# Patient Record
Sex: FEMALE | Race: WHITE | NOT HISPANIC OR LATINO | Employment: OTHER | URBAN - NONMETROPOLITAN AREA
[De-identification: names, ages, dates, MRNs, and addresses within clinical notes are randomized per-mention and may not be internally consistent; named-entity substitution may affect disease eponyms.]

---

## 2018-01-31 ENCOUNTER — CONVERSION ENCOUNTER (OUTPATIENT)
Dept: FAMILY MEDICINE CLINIC | Age: 68
End: 2018-01-31

## 2018-02-06 ENCOUNTER — CONVERSION ENCOUNTER (OUTPATIENT)
Dept: FAMILY MEDICINE CLINIC | Age: 68
End: 2018-02-06

## 2018-03-01 ENCOUNTER — CONVERSION ENCOUNTER (OUTPATIENT)
Dept: FAMILY MEDICINE CLINIC | Age: 68
End: 2018-03-01

## 2018-03-16 ENCOUNTER — OFFICE VISIT CONVERTED (OUTPATIENT)
Dept: FAMILY MEDICINE CLINIC | Age: 68
End: 2018-03-16
Attending: FAMILY MEDICINE

## 2018-06-12 ENCOUNTER — OFFICE VISIT CONVERTED (OUTPATIENT)
Dept: FAMILY MEDICINE CLINIC | Age: 68
End: 2018-06-12
Attending: NURSE PRACTITIONER

## 2018-07-10 ENCOUNTER — OFFICE VISIT CONVERTED (OUTPATIENT)
Dept: FAMILY MEDICINE CLINIC | Age: 68
End: 2018-07-10
Attending: FAMILY MEDICINE

## 2018-08-07 ENCOUNTER — OFFICE VISIT CONVERTED (OUTPATIENT)
Dept: FAMILY MEDICINE CLINIC | Age: 68
End: 2018-08-07
Attending: FAMILY MEDICINE

## 2018-08-20 ENCOUNTER — CONVERSION ENCOUNTER (OUTPATIENT)
Dept: FAMILY MEDICINE CLINIC | Age: 68
End: 2018-08-20

## 2018-09-14 ENCOUNTER — CONVERSION ENCOUNTER (OUTPATIENT)
Dept: FAMILY MEDICINE CLINIC | Age: 68
End: 2018-09-14

## 2018-09-28 ENCOUNTER — CONVERSION ENCOUNTER (OUTPATIENT)
Dept: FAMILY MEDICINE CLINIC | Age: 68
End: 2018-09-28

## 2018-10-22 ENCOUNTER — CONVERSION ENCOUNTER (OUTPATIENT)
Dept: FAMILY MEDICINE CLINIC | Age: 68
End: 2018-10-22

## 2019-03-25 ENCOUNTER — HOSPITAL ENCOUNTER (OUTPATIENT)
Dept: OTHER | Facility: HOSPITAL | Age: 69
Discharge: HOME | End: 2019-03-25
Attending: FAMILY MEDICINE

## 2019-03-25 ENCOUNTER — OFFICE VISIT CONVERTED (OUTPATIENT)
Dept: FAMILY MEDICINE CLINIC | Age: 69
End: 2019-03-25
Attending: FAMILY MEDICINE

## 2019-03-25 LAB
ALBUMIN SERPL-MCNC: 3.7 G/DL (ref 3.5–5)
ALBUMIN/GLOB SERPL: 0.7 {RATIO} (ref 1.4–2.6)
ALP SERPL-CCNC: 113 U/L (ref 43–160)
ALT SERPL-CCNC: 11 U/L (ref 10–40)
ANION GAP SERPL CALC-SCNC: 18 MMOL/L (ref 8–19)
AST SERPL-CCNC: 19 U/L (ref 15–50)
BASOPHILS # BLD MANUAL: 0.06 10*3/UL (ref 0–0.2)
BASOPHILS NFR BLD MANUAL: 0.6 % (ref 0–3)
BILIRUB SERPL-MCNC: 0.29 MG/DL (ref 0.2–1.3)
BUN SERPL-MCNC: 16 MG/DL (ref 5–25)
BUN/CREAT SERPL: 16 {RATIO} (ref 6–20)
CALCIUM SERPL-MCNC: 9.6 MG/DL (ref 8.7–10.4)
CHLORIDE SERPL-SCNC: 89 MMOL/L (ref 99–111)
CHOLEST SERPL-MCNC: 176 MG/DL (ref 107–200)
CHOLEST/HDLC SERPL: 4.6 {RATIO} (ref 3–6)
CONV CO2: 33 MMOL/L (ref 22–32)
CONV TOTAL PROTEIN: 8.8 G/DL (ref 6.3–8.2)
CREAT UR-MCNC: 1 MG/DL (ref 0.5–0.9)
DEPRECATED RDW RBC AUTO: 45.9 FL
EOSINOPHIL # BLD MANUAL: 0.16 10*3/UL (ref 0–0.7)
EOSINOPHIL NFR BLD MANUAL: 1.6 % (ref 0–7)
ERYTHROCYTE [DISTWIDTH] IN BLOOD BY AUTOMATED COUNT: 12.8 % (ref 11.5–14.5)
EST. AVERAGE GLUCOSE BLD GHB EST-MCNC: 237 MG/DL
GFR SERPLBLD BASED ON 1.73 SQ M-ARVRAT: 57 ML/MIN/{1.73_M2}
GLOBULIN UR ELPH-MCNC: 5.1 G/DL (ref 2–3.5)
GLUCOSE SERPL-MCNC: 290 MG/DL (ref 65–99)
GRANS (ABSOLUTE): 6.79 10*3/UL (ref 2–8)
GRANS: 69.3 % (ref 30–85)
HBA1C MFR BLD: 14.8 G/DL (ref 12–16)
HBA1C MFR BLD: 9.9 % (ref 3.5–5.7)
HCT VFR BLD AUTO: 45.3 % (ref 37–47)
HDLC SERPL-MCNC: 38 MG/DL (ref 40–60)
IMM GRANULOCYTES # BLD: 0.03 10*3/UL (ref 0–0.54)
IMM GRANULOCYTES NFR BLD: 0.3 % (ref 0–0.43)
LDLC SERPL CALC-MCNC: 82 MG/DL (ref 70–100)
LYMPHOCYTES # BLD MANUAL: 1.94 10*3/UL (ref 1–5)
LYMPHOCYTES NFR BLD MANUAL: 8.4 % (ref 3–10)
MCH RBC QN AUTO: 31.6 PG (ref 27–31)
MCHC RBC AUTO-ENTMCNC: 32.7 G/DL (ref 33–37)
MCV RBC AUTO: 96.6 FL (ref 81–99)
MONOCYTES # BLD AUTO: 0.82 10*3/UL (ref 0.2–1.2)
OSMOLALITY SERPL CALC.SUM OF ELEC: 294 MOSM/KG (ref 273–304)
PLATELET # BLD AUTO: 355 10*3/UL (ref 130–400)
PMV BLD AUTO: 8.5 FL (ref 7.4–10.4)
POTASSIUM SERPL-SCNC: 4.1 MMOL/L (ref 3.5–5.3)
RBC # BLD AUTO: 4.69 10*6/UL (ref 4.2–5.4)
SODIUM SERPL-SCNC: 136 MMOL/L (ref 135–147)
TRIGL SERPL-MCNC: 281 MG/DL (ref 40–150)
TSH SERPL-ACNC: 2.61 M[IU]/L (ref 0.27–4.2)
VARIANT LYMPHS NFR BLD MANUAL: 19.8 % (ref 20–45)
VLDLC SERPL-MCNC: 56 MG/DL (ref 5–37)
WBC # BLD AUTO: 9.8 10*3/UL (ref 4.8–10.8)

## 2019-08-20 ENCOUNTER — TELEPHONE (OUTPATIENT)
Dept: ORTHOPEDIC SURGERY | Facility: CLINIC | Age: 69
End: 2019-08-20

## 2019-08-20 NOTE — TELEPHONE ENCOUNTER
HAD AN OPENING AVAILABLE ON 9/3/19 AT 11AM. I'VE MOVED HER TO THAT DATE. HER VOICE MAILBOX WAS FULL/AW

## 2019-09-18 ENCOUNTER — OFFICE VISIT (OUTPATIENT)
Dept: ORTHOPEDIC SURGERY | Facility: CLINIC | Age: 69
End: 2019-09-18

## 2019-09-18 DIAGNOSIS — M25.562 PAIN IN BOTH KNEES, UNSPECIFIED CHRONICITY: Primary | ICD-10-CM

## 2019-09-18 DIAGNOSIS — M25.561 PAIN IN BOTH KNEES, UNSPECIFIED CHRONICITY: Primary | ICD-10-CM

## 2019-09-18 PROCEDURE — 99204 OFFICE O/P NEW MOD 45 MIN: CPT | Performed by: PHYSICIAN ASSISTANT

## 2019-09-18 PROCEDURE — 20610 DRAIN/INJ JOINT/BURSA W/O US: CPT | Performed by: PHYSICIAN ASSISTANT

## 2019-09-18 RX ORDER — GLYBURIDE 5 MG/1
5 TABLET ORAL 2 TIMES DAILY
Refills: 4 | COMMUNITY
Start: 2019-08-14 | End: 2021-09-29

## 2019-09-18 RX ORDER — CLONAZEPAM 2 MG/1
2 TABLET ORAL 3 TIMES DAILY PRN
Refills: 0 | COMMUNITY
Start: 2019-08-09 | End: 2021-08-10

## 2019-09-18 RX ORDER — LIDOCAINE HYDROCHLORIDE 10 MG/ML
2 INJECTION, SOLUTION EPIDURAL; INFILTRATION; INTRACAUDAL; PERINEURAL
Status: COMPLETED | OUTPATIENT
Start: 2019-09-18 | End: 2019-09-18

## 2019-09-18 RX ORDER — WARFARIN SODIUM 2 MG/1
TABLET ORAL
COMMUNITY
Start: 2019-09-06 | End: 2021-08-10 | Stop reason: SDUPTHER

## 2019-09-18 RX ORDER — VENLAFAXINE HYDROCHLORIDE 150 MG/1
150 CAPSULE, EXTENDED RELEASE ORAL DAILY
COMMUNITY
Start: 2016-05-08 | End: 2021-06-21 | Stop reason: SDUPTHER

## 2019-09-18 RX ORDER — POTASSIUM CHLORIDE 20 MEQ/1
40 TABLET, EXTENDED RELEASE ORAL 2 TIMES DAILY
Refills: 5 | COMMUNITY
Start: 2019-08-23 | End: 2022-08-11 | Stop reason: SDUPTHER

## 2019-09-18 RX ORDER — VENLAFAXINE HYDROCHLORIDE 75 MG/1
75 CAPSULE, EXTENDED RELEASE ORAL DAILY
Refills: 2 | COMMUNITY
Start: 2019-06-26 | End: 2021-06-21

## 2019-09-18 RX ORDER — METOPROLOL TARTRATE 50 MG/1
75 TABLET, FILM COATED ORAL EVERY 8 HOURS
COMMUNITY
Start: 2016-05-18 | End: 2021-09-14

## 2019-09-18 RX ADMIN — LIDOCAINE HYDROCHLORIDE 2 ML: 10 INJECTION, SOLUTION EPIDURAL; INFILTRATION; INTRACAUDAL; PERINEURAL at 13:58

## 2019-09-18 RX ADMIN — LIDOCAINE HYDROCHLORIDE 2 ML: 10 INJECTION, SOLUTION EPIDURAL; INFILTRATION; INTRACAUDAL; PERINEURAL at 13:57

## 2019-09-18 NOTE — PROGRESS NOTES
NEW VISIT    Patient: Annie Friend  ?  YOB: 1950    MRN: [id number]  ?  Chief Complaint   Patient presents with   • Right Knee - Establish Care, Pain   • Left Knee - Establish Care, Pain      ?  HPI:   69-year-old female patient presents today with complaint of bilateral knee pain.  Her granddaughter accompanies her to today's visit.  She reports pain in both knees for the last 3 years along with a history of spinal stenosis and a laminectomy.  She states she has not driven a car for about 3 years due to her knees and her back.  In the last several months she has noticed more frequent, persistent pain in both knees.  Her left knee is causing more pain than the right but states the right knee has more crepitus upon movement.  She reports she would like to drive again if she can get her knees better.  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   She has seen Dr. Hickey in the past for the knee pain and placed on diclofenac but for some reason was taken off of it. Her past medical history is remarkable for uncontrolled diabetes with an A1c of 10%.  She states is not usually been this high in the past and her primary care provider is looking into changing her medications.      Pain Location: bilateral knee(s)  Radiation: Into medial face of tibia bilaterally  Quality: aching  Intensity/Severity: severe  Duration: 3 year(s) with acute exacerbation  Onset quality: gradual   Timing: constant  Aggravating Factors: any weight bearing, rising after sitting, walking or standing for prolonged time  Alleviating Factors: rest helps to ease the pain but does not resolve it  Previous Episodes: yes no  Associated Symptoms: pain, swelling, clicking/popping, decreased ROM  ADLs Affected: dressing, ambulating, recreational activities/sports  Previous Treatment: Oral NSAIDs    This patient is a new patient.  This problem is new to this  examiner.      Allergies: Allergies no known allergies    Medications:   Home Medications:  Current Outpatient Medications on File Prior to Visit   Medication Sig   • metoprolol tartrate (LOPRESSOR) 50 MG tablet Take 75 mg by mouth Every 8 (Eight) Hours.   • venlafaxine XR (EFFEXOR-XR) 150 MG 24 hr capsule Take 150 mg by mouth Daily.   • clonazePAM (KlonoPIN) 2 MG tablet Take 2 mg by mouth 3 (Three) Times a Day As Needed.   • glyBURIDE (DIAbeta) 5 MG tablet Take 5 mg by mouth 2 (Two) Times a Day.   • magnesium oxide (MAGOX) 400 (241.3 Mg) MG tablet tablet Take 1 tablet by mouth.   • potassium chloride (K-DUR,KLOR-CON) 20 MEQ CR tablet Take 40 mEq by mouth 2 (Two) Times a Day.   • venlafaxine XR (EFFEXOR-XR) 75 MG 24 hr capsule Take 75 mg by mouth Daily.   • warfarin (COUMADIN) 2 MG tablet      No current facility-administered medications on file prior to visit.      Current Medications:  Scheduled Meds:  PRN Meds:.    I have reviewed the patient's medical history in detail and updated the computerized patient record.  Review and summarization of old records include:    No past medical history on file.  No past surgical history on file.  Social History     Occupational History   • Not on file   Tobacco Use   • Smoking status: Not on file   Substance and Sexual Activity   • Alcohol use: Not on file   • Drug use: Not on file   • Sexual activity: Not on file      Social History     Social History Narrative   • Not on file     No family history on file.      Review of Systems  Constitutional: Negative.  Negative for fever.   Eyes: Negative.    Respiratory: Negative.    Cardiovascular: Negative.    Endocrine: Negative.    Musculoskeletal: Positive for arthralgias, gait problem and joint swelling.   Skin: Negative.  Negative for rash and wound.   Allergic/Immunologic: Negative.    Neurological: Negative for numbness.   Hematological: Negative.    Psychiatric/Behavioral: Negative.         Wt Readings from Last 3 Encounters:    No data found for Wt     Ht Readings from Last 3 Encounters:   No data found for Ht     There is no height or weight on file to calculate BMI.  No height and weight on file for this encounter.  There were no vitals filed for this visit.      Physical Exam  Constitutional: Patient is oriented to person, place, and time. Appears well-developed and well-nourished.   HENT:   Head: Normocephalic and atraumatic.   Eyes: Conjunctivae and EOM are normal. Pupils are equal, round, and reactive to light.   Cardiovascular: Normal rate and intact distal pulses.   Pulmonary/Chest: Effort normal and breath sounds normal.   Musculoskeletal:   See detailed exam below   Neurological: Alert and oriented to person, place, and time. No sensory deficit. Coordination normal.   Skin: Skin is warm and dry. Capillary refill takes less than 2 seconds. No rash noted. No erythema.   Psychiatric: Patient has a normal mood and affect. Her behavior is normal. Judgement and thought content normal.   Nursing note and vitals reviewed.      Ortho Exam:   Affected Knee(s): Bilateral knee  Patient has crepitus throughout range of motion.   Moderate effusion.   Significant joint line tenderness is noted on the medial aspect of the knee(s).   Patient has a varus orientation of the knee(s).   There is fullness and tenderness in the popliteal fossa.   Range of motion: Flexion 0- 100 degrees.   Neurovascular status is intact.  Dorsalis pedis and posterior tibial artery pulses are palpable. Common peroneal nerve function is well preserved.   Patient's gait is cautious and antalgic. Skin and soft tissues are mildly swollen, consistent with synovitis and effusion.   There is significant pitting edema bilateral lower extremities.  Special Testing:  Lachman negative,   Anterior drawernegative,   Posterior drawer negative,   Caridad's negative,   Patellofemoral grind positive,   Pivot shift is negative.        Diagnostics:  xrays obtained today  X-Ray  Report:  Bilateral knee(s) X-Ray  Indication: Evaluation of bilateral knee pain  AP, Lateral views  Findings: Advanced tricompartmental osteoarthritis bilaterally. Right knee appears most advanced in the medial space and Left knee appears more advanced in the patellofemoral space, although there is significant arthritis throughout the entire joint bilaterally. There is bone on bone appearance bilaterally.  Bony lesion: no  Soft tissues: increased  Joint spaces: decreased  Hardware appropriately positioned: not applicable  Prior studies available for comparison: no   X-RAY was ordered and reviewed by Devin Cueto PA-C  This patient's x-ray report was graded according to the Kellgren and Jesus classification.  This took into account the joint space narrowing, osteophyte formation, sclerosis of the distal femur/proximal tibia along with deformity of those bones.  The findings were indicative of K L grade 4 bilaterally.         Assessment:  Annie was seen today for establish care, pain, establish care and pain.    Diagnoses and all orders for this visit:    Pain in both knees, unspecified chronicity  -     XR Knee 3 View Bilateral  -     Large Joint Arthrocentesis: R knee  -     Large Joint Arthrocentesis: L knee          Procedures  See separate procedure note      Plan    · Steroid and Monovisc injection discussed-she is not a candidate for steroid injection at this time due to uncontrolled diabetes but she would like to proceed with a Monovisc injection.  · Injected patient's bilateral knee joint(s)with Monovisc from a(n) anteromedial approach.  Patient tolerated the procedure well and no complications were encountered.  · Exercise options discussed and encouraged  · Use of walker, hinge knee brace discussed and recommended   · Weight loss recommended  · Medication options discussed and recommended  · Surgical options discussed bilateral total knee-performed individually  · We will request records from  primary care provider regarding any visit for knee pain or treatment as well as diabetic records including lab work.  I will use these labs as a guide to determine possible future treatment such as steroid injections or total knee replacement.  · At this time patient states her A1C is 10% which will automatically rule her out as a candidate for a total knee replacement and or steroid injections until diabetes is better controlled.  · Rest, ice, compression, and elevation (RICE) therapy  · Stretching and strengthening exercises  · Alternate OTC Ibuprofen and Tylenol as needed/if clinically indicated  · I will have her follow up in 3 month(s) with Dr. Ayala so she can meet him and they can further discuss possible surgical or conservative treatment options based on diabetic control.    Date of encounter: 09/18/2019   Devin Cueto PA-C    Electronically signed by Devin Cueto PA-C, 09/18/19, 2:51 PM.

## 2019-09-18 NOTE — PROGRESS NOTES
Procedure   Large Joint Arthrocentesis: R knee  Date/Time: 9/18/2019 1:57 PM  Consent given by: patient  Site marked: site marked  Timeout: Immediately prior to procedure a time out was called to verify the correct patient, procedure, equipment, support staff and site/side marked as required   Supporting Documentation  Indications: pain   Procedure Details  Location: knee - R knee  Preparation: Patient was prepped and draped in the usual sterile fashion  Needle size: 25 G  Approach: anteromedial  Medications administered: 2 mL lidocaine PF 1% 1 %; 88 mg Hyaluronan 88 MG/4ML  Patient tolerance: patient tolerated the procedure well with no immediate complications    Large Joint Arthrocentesis: L knee  Date/Time: 9/18/2019 1:58 PM  Consent given by: patient  Site marked: site marked  Timeout: Immediately prior to procedure a time out was called to verify the correct patient, procedure, equipment, support staff and site/side marked as required   Supporting Documentation  Indications: pain   Procedure Details  Location: knee - L knee  Preparation: Patient was prepped and draped in the usual sterile fashion  Needle size: 25 G  Approach: anteromedial  Medications administered: 2 mL lidocaine PF 1% 1 %; 88 mg Hyaluronan 88 MG/4ML  Patient tolerance: patient tolerated the procedure well with no immediate complications      Electronically signed by Devin Cueto PA-C, 09/18/19, 4:47 PM.

## 2019-10-10 ENCOUNTER — HOSPITAL ENCOUNTER (OUTPATIENT)
Dept: OTHER | Facility: HOSPITAL | Age: 69
Discharge: HOME | End: 2019-10-10
Attending: FAMILY MEDICINE

## 2019-10-10 ENCOUNTER — OFFICE VISIT CONVERTED (OUTPATIENT)
Dept: FAMILY MEDICINE CLINIC | Age: 69
End: 2019-10-10
Attending: FAMILY MEDICINE

## 2019-10-10 LAB
ALBUMIN SERPL-MCNC: 3.5 G/DL (ref 3.5–5)
ALBUMIN/GLOB SERPL: 0.8 {RATIO} (ref 1.4–2.6)
ALP SERPL-CCNC: 97 U/L (ref 43–160)
ALT SERPL-CCNC: 8 U/L (ref 10–40)
ANION GAP SERPL CALC-SCNC: 15 MMOL/L (ref 8–19)
APPEARANCE UR: ABNORMAL
AST SERPL-CCNC: 13 U/L (ref 15–50)
BASOPHILS # BLD AUTO: 0.1 10*3/UL (ref 0–0.2)
BASOPHILS NFR BLD AUTO: 1.1 % (ref 0–3)
BILIRUB SERPL-MCNC: 0.32 MG/DL (ref 0.2–1.3)
BILIRUB UR QL: NEGATIVE
BUN SERPL-MCNC: 18 MG/DL (ref 5–25)
BUN/CREAT SERPL: 22 {RATIO} (ref 6–20)
CALCIUM SERPL-MCNC: 9.8 MG/DL (ref 8.7–10.4)
CHLORIDE SERPL-SCNC: 91 MMOL/L (ref 99–111)
CHOLEST SERPL-MCNC: 143 MG/DL (ref 107–200)
CHOLEST/HDLC SERPL: 4.1 {RATIO} (ref 3–6)
COLOR UR: YELLOW
CONV ABS IMM GRAN: 0.05 10*3/UL (ref 0–0.2)
CONV BACTERIA: ABNORMAL
CONV CO2: 34 MMOL/L (ref 22–32)
CONV COLLECTION SOURCE (UA): ABNORMAL
CONV HYALINE CASTS IN URINE MICRO: ABNORMAL /[LPF]
CONV IMMATURE GRAN: 0.5 % (ref 0–1.8)
CONV TOTAL PROTEIN: 8 G/DL (ref 6.3–8.2)
CONV UROBILINOGEN IN URINE BY AUTOMATED TEST STRIP: 1 {EHRLICHU}/DL (ref 0.1–1)
CREAT UR-MCNC: 0.83 MG/DL (ref 0.5–0.9)
DEPRECATED RDW RBC AUTO: 46.5 FL (ref 36.4–46.3)
EOSINOPHIL # BLD AUTO: 0.22 10*3/UL (ref 0–0.7)
EOSINOPHIL # BLD AUTO: 2.4 % (ref 0–7)
ERYTHROCYTE [DISTWIDTH] IN BLOOD BY AUTOMATED COUNT: 12.9 % (ref 11.7–14.4)
EST. AVERAGE GLUCOSE BLD GHB EST-MCNC: 266 MG/DL
GFR SERPLBLD BASED ON 1.73 SQ M-ARVRAT: >60 ML/MIN/{1.73_M2}
GLOBULIN UR ELPH-MCNC: 4.5 G/DL (ref 2–3.5)
GLUCOSE SERPL-MCNC: 240 MG/DL (ref 65–99)
GLUCOSE UR QL: 250 MG/DL
HBA1C MFR BLD: 10.9 % (ref 3.5–5.7)
HCT VFR BLD AUTO: 46.7 % (ref 37–47)
HDLC SERPL-MCNC: 35 MG/DL (ref 40–60)
HGB BLD-MCNC: 14.2 G/DL (ref 12–16)
HGB UR QL STRIP: ABNORMAL
KETONES UR QL STRIP: NEGATIVE MG/DL
LDLC SERPL CALC-MCNC: 61 MG/DL (ref 70–100)
LEUKOCYTE ESTERASE UR QL STRIP: ABNORMAL
LYMPHOCYTES # BLD AUTO: 2.32 10*3/UL (ref 1–5)
LYMPHOCYTES NFR BLD AUTO: 24.8 % (ref 20–45)
MAGNESIUM SERPL-MCNC: 1.95 MG/DL (ref 1.6–2.3)
MCH RBC QN AUTO: 29.9 PG (ref 27–31)
MCHC RBC AUTO-ENTMCNC: 30.4 G/DL (ref 33–37)
MCV RBC AUTO: 98.3 FL (ref 81–99)
MONOCYTES # BLD AUTO: 0.59 10*3/UL (ref 0.2–1.2)
MONOCYTES NFR BLD AUTO: 6.3 % (ref 3–10)
NEUTROPHILS # BLD AUTO: 6.07 10*3/UL (ref 2–8)
NEUTROPHILS NFR BLD AUTO: 64.9 % (ref 30–85)
NITRITE UR QL STRIP: NEGATIVE
NRBC CBCN: 0 % (ref 0–0.7)
OSMOLALITY SERPL CALC.SUM OF ELEC: 292 MOSM/KG (ref 273–304)
PH UR STRIP.AUTO: 6.5 [PH] (ref 5–8)
PLATELET # BLD AUTO: 309 10*3/UL (ref 130–400)
PMV BLD AUTO: 8.4 FL (ref 9.4–12.3)
POTASSIUM SERPL-SCNC: 4.2 MMOL/L (ref 3.5–5.3)
PROT UR QL: 100 MG/DL
RBC # BLD AUTO: 4.75 10*6/UL (ref 4.2–5.4)
RBC #/AREA URNS HPF: ABNORMAL /[HPF]
SODIUM SERPL-SCNC: 136 MMOL/L (ref 135–147)
SP GR UR: 1.03 (ref 1–1.03)
SQUAMOUS SPT QL MICRO: ABNORMAL /[HPF]
TRIGL SERPL-MCNC: 235 MG/DL (ref 40–150)
TSH SERPL-ACNC: 2.1 M[IU]/L (ref 0.27–4.2)
VLDLC SERPL-MCNC: 47 MG/DL (ref 5–37)
WBC # BLD AUTO: 9.35 10*3/UL (ref 4.8–10.8)
WBC #/AREA URNS HPF: ABNORMAL /[HPF]

## 2019-10-12 LAB — HCV AB S/CO SERPL IA: 0.2 S/CO RATIO (ref 0–0.9)

## 2019-10-13 LAB — BACTERIA UR CULT: NORMAL

## 2020-01-13 ENCOUNTER — OFFICE VISIT CONVERTED (OUTPATIENT)
Dept: FAMILY MEDICINE CLINIC | Age: 70
End: 2020-01-13
Attending: FAMILY MEDICINE

## 2020-01-28 ENCOUNTER — HOSPITAL ENCOUNTER (OUTPATIENT)
Dept: OTHER | Facility: HOSPITAL | Age: 70
Discharge: HOME | End: 2020-01-28
Attending: FAMILY MEDICINE

## 2020-02-01 LAB
7-AMINOCLONAZEPAM UR: >1000 NG/ML
A-OH ALPRAZ UR CFM-MCNC: <5 NG/ML
ALPHA-HYDROXYMIDAZOLAM, URINE: <20 NG/ML
ALPRAZ UR QL: <5 NG/ML
CLONAZEPAM UR QL: 46 NG/ML
CONV CHOLRDIAZEPOXIDE, QN URINE: <20 NG/ML
CONV OXAZEPAM, (TEMAZEPAM) QUANT: <20 NG/ML
DIAZEPAM UR-MCNC: <20 NG/ML
LORAZEPAM UR-MCNC: <20 NG/ML
MIDAZOLAM URINE: <20 NG/ML
NORDIAZEPAM URINE: <20 NG/ML
TEMAZEPAM UR QL CFM: <20 NG/ML

## 2020-04-08 ENCOUNTER — OFFICE VISIT CONVERTED (OUTPATIENT)
Dept: FAMILY MEDICINE CLINIC | Age: 70
End: 2020-04-08
Attending: FAMILY MEDICINE

## 2020-08-10 ENCOUNTER — OFFICE VISIT CONVERTED (OUTPATIENT)
Dept: FAMILY MEDICINE CLINIC | Age: 70
End: 2020-08-10
Attending: FAMILY MEDICINE

## 2020-10-19 ENCOUNTER — HOSPITAL ENCOUNTER (OUTPATIENT)
Dept: OTHER | Facility: HOSPITAL | Age: 70
Discharge: HOME | End: 2020-10-19
Attending: FAMILY MEDICINE

## 2020-10-19 LAB
APPEARANCE UR: ABNORMAL
BACTERIA UR CULT: NORMAL
BACTERIA UR QL AUTO: ABNORMAL
BILIRUB UR QL: NEGATIVE
CASTS URNS QL MICRO: ABNORMAL /[LPF]
COLOR UR: YELLOW
CONV LEUKOCYTE ESTERASE: NEGATIVE
CONV UROBILINOGEN IN URINE BY AUTOMATED TEST STRIP: 0.2 {EHRLICHU}/DL (ref 0.1–1)
EPI CELLS #/AREA URNS HPF: ABNORMAL /[HPF]
GLUCOSE 24H UR-MCNC: 500 MG/DL
HGB UR QL STRIP: ABNORMAL
KETONES UR QL STRIP: NEGATIVE MG/DL
MUCOUS THREADS URNS QL MICRO: ABNORMAL
NITRITE UR-MCNC: POSITIVE MG/ML
PH UR STRIP.AUTO: 5.5 [PH] (ref 5–8)
PROT UR-MCNC: 100 MG/DL
RBC # BLD AUTO: ABNORMAL /[HPF]
SP GR UR STRIP: >=1.03 (ref 1–1.03)
SPECIMEN SOURCE: ABNORMAL
UNIDENT CRYS URNS QL MICRO: ABNORMAL /[HPF]
WBC #/AREA URNS HPF: ABNORMAL /[HPF]

## 2020-10-22 LAB
AMPICILLIN SUSC ISLT: <=2
AMPICILLIN+SULBAC SUSC ISLT: <=2
BACTERIA UR CULT: ABNORMAL
CEFAZOLIN SUSC ISLT: <=4
CEFEPIME SUSC ISLT: <=0.12
CEFTAZIDIME SUSC ISLT: <=1
CEFTRIAXONE SUSC ISLT: <=0.25
CIPROFLOXACIN SUSC ISLT: <=0.25
ERTAPENEM SUSC ISLT: <=0.12
GENTAMICIN SUSC ISLT: <=1
LEVOFLOXACIN SUSC ISLT: <=0.12
NITROFURANTOIN SUSC ISLT: <=16
PIP+TAZO SUSC ISLT: <=4
TMP SMX SUSC ISLT: <=20
TOBRAMYCIN SUSC ISLT: <=1

## 2020-11-11 ENCOUNTER — OFFICE VISIT CONVERTED (OUTPATIENT)
Dept: FAMILY MEDICINE CLINIC | Age: 70
End: 2020-11-11
Attending: NURSE PRACTITIONER

## 2021-03-11 ENCOUNTER — OFFICE VISIT CONVERTED (OUTPATIENT)
Dept: FAMILY MEDICINE CLINIC | Age: 71
End: 2021-03-11
Attending: FAMILY MEDICINE

## 2021-05-18 NOTE — PROGRESS NOTES
Annie Friend  1950     Office/Outpatient Visit    Visit Date: Mon, Aug 10, 2020 02:24 pm    Provider: Aroldo Hickey MD (Assistant: Aura Kilpatrick MA)    Location: [facility]        Electronically signed by Aroldo Hickey MD on  08/10/2020 03:52:41 PM                             Subjective:        CC: Ca is a 70 year old White female.  Blister on right lower shin. Phone [phone]; Today's encounter is being done with a telehealth visit. She has consented verbally with two witnesses for todays treatment. Todays visit is being conducted by audio only. Individuals present during the telemedicine consultation include AROLDO SHARMA MD         HPI:           Complaint of blister (nonthermal), right lower leg, initial encounter..  The symptom began 2 days ago.  The severity is of mild intensity.  DESCRIBES AN ` 2 X 2 CM VESICLE ON THE ANTERIOR RIGHT LOWER LEG.      ROS:     CONSTITUTIONAL:  Negative for chills and fever.      E/N/T:  Negative for ear pain, frequent rhinorrhea and sore throat.      CARDIOVASCULAR:  Positive for palpitations ( OCC, BRIEF ) and pedal edema ( mild ).   Negative for chest pain.      RESPIRATORY:  Negative for recent cough and dyspnea.      GASTROINTESTINAL:  Negative for abdominal pain, nausea and vomiting.      NEUROLOGICAL:  Negative for headaches.          Past Medical History / Family History / Social History:         Last Reviewed on 8/10/2020 02:27 PM by Aroldo Hickey    Past Medical History:                 PAST MEDICAL HISTORY             GYNECOLOGICAL HISTORY:             CURRENT MEDICAL PROVIDERS:    Cardiologist             ADVANCED DIRECTIVES: None         PREVENTIVE HEALTH MAINTENANCE             COLORECTAL CANCER SCREENING: declines colorectal cancer screening, understands reason for testing     Hepatitis C Medicare Screening: was last done      MAMMOGRAM: was last done  with normal results     PAP  SMEAR: No longer indicated due to age and history         Surgical History:     Surgeries:    Appendectomy    Cholecystectomy Other Surgeries     section: X 1;     Hysterectomy: WITH BSO;     Laminectomy: lumbar region; 2016;;; Procedures: Treadmill stress test --NEG;; NEG ECHO AND HOLTER ;;         Family History:         Positive for Coronary Artery Disease.      Positive for Cerebrovascular Accident.  Father:  at age 84;  Valve surgery;  Dementia     Mother:  at age 73; Cause of death was CHF         Social History:     Occupation:    Retired     Marital Status:          Tobacco/Alcohol/Supplements:     Last Reviewed on 8/10/2020 02:27 PM by Aroldo Hickey    Tobacco: She has a past history of cigarette smoking; quit date:  18.          Substance Abuse History:     Last Reviewed on 8/10/2020 02:27 PM by Aroldo Hickey    NEGATIVE         Current Problems:     Last Reviewed on 8/10/2020 02:27 PM by Aroldo Hickey    Dysthymic disorder    Bilateral primary osteoarthritis of knee    Paroxysmal atrial fibrillation    Other long term (current) drug therapy    Essential (primary) hypertension    Long term (current) use of anticoagulants    Type 2 diabetes mellitus without complications    Peripheral vascular disease, unspecified    Chronic diastolic (congestive) heart failure        Immunizations:     zzPrevnar-13 2017    zzFluzone pf-quadrivalent 3 and up 2014    Fluzone (3 + years dose) 2010    Fluzone (3 + years dose) 12/15/2011    Pneomovax 2014    Fluzone High-Dose pf (>=65 yr) 10/7/2016    Fluzone High-Dose pf (>=65 yr) 10/13/2017    Fluzone High-Dose pf (>=65 yr) 2018    Fluzone High-Dose pf (>=65 yr) 10/10/2019        Allergies:     Last Reviewed on 8/10/2020 02:27 PM by Aroldo Hickey    Codeine Sulfate:      Penicillins:          Current Medications:     Last Reviewed on 8/10/2020 02:27 PM by Aroldo Hickey  Jesus    Potassium Chloride 20 mEq oral Tablet, Extended Release Particles/Crystals [2 tablets BID]    clonazePAM 2 mg oral tablet [TAKE 1 TABLET BY MOUTH THREE TIMES DAILY AS NEEDED]    venlafaxine 75 mg oral Capsule, Extended Release 24 hr [Take 1 capsule by mouth once daily]    warfarin 2 mg oral tablet [TAKE 1.5MG BY MOUTH ON MONDAY, WEDNESDAY, AND FRIDAY AND 2MG ON TUESDAY, THURSDAY, SATURDAY, AND SUNDAY]    warfarin 3 mg oral tablet [TAKE 1/2 (ONE-HALF) TABLET BY MOUTH ON MONDAY, WEDNESDAY, FRIDAY, AND 2 MG ON TUESDAY, THURSDAY, SATURDAY, AND SUNDAY.]    glyBURIDE 5 mg oral tablet [TAKE 1 TABLET BY MOUTH TWICE DAILY]    Metoprolol Tartrate 50 mg oral tablet [1 tab bid]    bisacodyl 10mg prn     Acetaminophen 650 mg oral tablet, extended release    Mag-Ox 400 400mg Tablet [1 po bid]    Bumetanide 2 mg oral tablet [1 tab bid]    digoxin 125 mcg (0.125 mg) oral tablet [Take 1 tablet by mouth once daily]        Assessment:         S80.821A   Blister (nonthermal), right lower leg, initial encounter           ORDERS:         Meds Prescribed:       [New Rx] Bactrim -160 mg oral tablet [take 1 tablet by oral route BID], #20 (twenty) tablets, Refills: 0 (zero)                 Plan:         Blister (nonthermal), right lower leg, initial encounter        RECOMMENDATIONS given include: ELEVATE LEG.      FOLLOW-UP: Schedule follow-up appointments on a p.r.n. basis.  Observe for now Consider further workup Telehealth: Verbal consent obtained for visit to occur via phone call; Total time spent was 5:05 minutes; [id number]--Illusbxug E/M 5-10 minutes           Prescriptions:       [New Rx] Bactrim -160 mg oral tablet [take 1 tablet by oral route BID], #20 (twenty) tablets, Refills: 0 (zero)             Patient Recommendations:        For  Blister (nonthermal), right lower leg, initial encounter:    Schedule follow-up appointments as needed.              Charge Capture:         Primary Diagnosis:     S80.821A   Blister (nonthermal), right lower leg, initial encounter           Orders:      [id number]  Phys/QHP telephone evaluation 5-10 min  (In-House)

## 2021-05-18 NOTE — PROGRESS NOTES
Annie FriendSindy 1950     Office/Outpatient Visit    Visit Date: Tue, Aug 7, 2018 04:17 pm    Provider: Aroldo Hickey MD (Assistant: Gloria Charles MA)    Location: [facility]        Electronically signed by Aroldo Hickey MD on  2018 06:26:14 PM                             SUBJECTIVE:        CC:     Ca is a 68 year old White female.  This is a follow-up visit.  ONE MONTH FOLLOW UP; PT STATES SHE STOPPED TAKING  MIRTAZAPINE OR ATORVASTATIN;         HPI:         Complaint of cHF..  The symptom began several months ago.  The severity is of moderate intensity.  LITTLE RESPONSE TO THE LASIX, WILL SEE CARDIOLOGY AGAIN SOON.      ROS:     CONSTITUTIONAL:  Positive for fatigue.   Negative for chills or fever.      CARDIOVASCULAR:  Positive for pedal edema ( moderate ).   Negative for chest pain or palpitations.      RESPIRATORY:  Negative for recent cough, dyspnea and change in her resp status from discharge from hospital.      GASTROINTESTINAL:  Negative for abdominal pain, nausea and vomiting.      MUSCULOSKELETAL:  Negative for myalgias.      NEUROLOGICAL:  Positive for weakness ( generalized ).   Negative for headaches.          PMH/FMH/SH:     Last Reviewed on 2018 04:33 PM by Aroldo Hickey    Past Medical History:                 PAST MEDICAL HISTORY             GYNECOLOGICAL HISTORY:        Last mammogram was 2013 Hospitalizations:    Pneumonia             ADVANCED DIRECTIVES: None         Surgical History:     Surgeries:    Appendectomy    Cholecystectomy Other Surgeries     section: X 1;     Hysterectomy: WITH BSO;     Laminectomy: lumbar region; 2016;;; Procedures: colonoscopy DECLINES;; Treadmill stress test --NEG;; NEG ECHO AND HOLTER ;;         Family History:         Positive for Coronary Artery Disease.      Positive for Cerebrovascular Accident.  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          Substance Abuse History:     Last Reviewed on 8/07/2018 04:31 PM by Aroldo Hickey    NEGATIVE             Current Problems:     Last Reviewed on 8/07/2018 04:39 PM by Aroldo Hickey    Urinary retention     CHF     Unspecified PVD-right leg     Unspecified PVD     NIDDM     HTN     Use of high risk medications     Paroxysmal atrial fibrillation     Osteoarthritis of knee     Depression responsive to treatment     Surgical menopause     Anticoagulation followup         Immunizations:     zzPrevnar-13 6/12/2017     zzFluzone pf-quadrivalent 3 and up 11/6/2014     Fluzone (3 + years dose) 11/24/2010     Fluzone (3 + years dose) 12/15/2011     Pneomovax 11/6/2014     Fluzone High-Dose pf (>=65 yr) 10/7/2016     Fluzone High-Dose pf (>=65 yr) 10/13/2017         Allergies:     Last Reviewed on 8/07/2018 04:31 PM by Aroldo Hickey    Codeine Sulfate:    Penicillins:        Current Medications:     Last Reviewed on 8/07/2018 04:37 PM by Aroldo Hickey    Digoxin 125mcg Tablet 1 tab daily     Glyburide 5mg Tablet Take 1 tablet(s) by mouth bid     Metoprolol Tartrate 25mg Tablet 1/2 tab daily     Tamsulosin HCl 0.4mg Capsules 1 tab daily     Mag-Ox 400 400mg Tablet 1 po bid     Potassium Chloride 20mEq Tablets, Extended Release 2 tablets BID     Venlafaxine HCl 75mg Capsules, Extended Release 1 cap daily     Klonopin 2mg Tablet 1 tab HS     Lasix 40mg Tablets 1 tab bid     Warfarin Sodium 1mg Tablet 1 qd     Warfarin Sodium 2.5mg Tablet One PO QD     Warfarin Sodium 3mg Tablet 1.5mg M & F 3mg rest of week.     O-2 2 L PER NC AT HS      Aspirin (ASA) 81mg Chewable Tablet one a day     Acetaminophen 650mg Tablets, Extended Release     bisacodyl 10mg prn         OBJECTIVE:        Vitals:         Current: 8/7/2018 4:25:59 PM    Ht:  5 ft, 5.5 in;  Wt: 238.4 lbs;  BMI: 39.1    T: 98.1 F (oral);  BP: 107/73 mm Hg (right arm, sitting);  P: 125 bpm (right arm (BP Cuff), sitting);  sCr: 0.77 mg/dL;  GFR: 83.99        Repeat:     4:26:13 PM     P:   88bpm (finger clip, sitting)         Exams:     PHYSICAL EXAM:     GENERAL: vital signs recorded - well developed, well nourished;  no apparent distress;     NECK: trachea is midline; thyroid is non-palpable;     RESPIRATORY: normal respiratory rate and pattern with no distress; normal breath sounds with no rales, rhonchi, wheezes or rubs;     CARDIOVASCULAR: normal rate; rhythm is regular;  no systolic murmur; 2+ pedal edema;     LYMPHATIC: no enlargement of cervical or facial nodes;     MUSCULOSKELETAL: gait: uses a walker;     NEUROLOGIC: GROSSLY INTACT;     PSYCHIATRIC:  appropriate affect and demeanor; normal speech pattern; grossly normal memory;         Lab/Test Results:         LABORATORY RESULTS: PT/INR 2.9-fk         ASSESSMENT           428.0   I50.32  CHF              DDx:     V58.61   Z79.01  Anticoagulation followup              DDx:         ORDERS:         Meds Prescribed:       Refill of: Potassium Chloride 20mEq Tablets, Extended Release 2 tablets BID  #120 (One Walnut and Twenty) tablet(s) Refills: 5         Lab Orders:       [id number]  PT/INR  (In-House)           Procedures Ordered:       [id number]  Collection of capillary blood specimen (eg, finger, heel, ear stick)  (In-House)                   PLAN:          CHF         RECOMMENDATIONS given include: SEE CARDIOLOGY AS PLANNED.      FOLLOW-UP: AFTER CARDIOLOGY APPT           Prescriptions:       Refill of: Potassium Chloride 20mEq Tablets, Extended Release 2 tablets BID  #120 (One Walnut and Twenty) tablet(s) Refills: 5          Anticoagulation  followup           Orders:       [id number]  Collection of capillary blood specimen (eg, finger, heel, ear stick)  (In-House)         [id number]  PT/INR  (In-House)               CHARGE CAPTURE           **Please note: ICD descriptions below are intended for billing purposes only and may not represent clinical diagnoses**        Primary Diagnosis:         428.0 CHF            I50.32    Chronic diastolic (congestive) heart failure              Orders:          [id number]   Office/outpatient visit; established patient, level 4  (In-House)           V58.61 Anticoagulation followup            Z79.01    Long term (current) use of anticoagulants              Orders:          [id number]   Collection of capillary blood specimen (eg, finger, heel, ear stick)  (In-House)             [id number]   PT/INR  (In-House)

## 2021-05-18 NOTE — PROGRESS NOTES
Annie Friend 1950     Office/Outpatient Visit    Visit Date: Mon, Mar 25, 2019 03:19 pm    Provider: Aroldo Hickey MD (Assistant: Arlene Rahman MA)    Location: [facility]        Electronically signed by Aroldo Hickey MD on  03/25/2019 09:41:51 PM                             SUBJECTIVE:        CC: (DOSAGE CHANGE BY DR BECK METOPROLOL TARTRATE 50MG BID)     Ca is a 69 year old White female.  This is a follow-up visit.  Chronics;         HPI:         Ca presents with nIDDM.  Specifically, this is type 2, non-insulin requiring diabetes.  Compliance with treatment has been good.  Current meds include none, was on insulin in patient.  She does not perform home blood glucose monitoring.  Most recent lab results include Systolic BP:  135 (03/16/2018), Diastolic BP:  83 (06/12/2018), Hemoglobin A1c:  7.2 (%) (01/24/2018), LDL:  80 (mg/dL) (01/24/2018), HDL:  32 (mg/dL) (01/24/2018), Triglycerides:  189 (mg/dL) (01/24/2018), Foot Exam (Annual):  01/24/2018 (01/24/2018).          With regard to the hTN, her current cardiac medication regimen includes a diuretic, a beta-blocker, an alpha blocker, and potassium.  Ca does not check her blood pressure other than at her clinic appointments.  Compliance with treatment has been good.          Additionally, she presents with history of depression responsive to treatment.  this is a routine follow-up.  Current medications include an antidepressant and an anxiolytic.  DOING WELL ON CURRENT MEDS     ROS:     CONSTITUTIONAL:  Positive for fatigue.   Negative for chills or fever.      CARDIOVASCULAR:  Positive for pedal edema ( moderate ).   Negative for chest pain or palpitations.      RESPIRATORY:  Negative for recent cough, dyspnea and change in her resp status from discharge from hospital.      GASTROINTESTINAL:  Negative for abdominal pain, nausea and vomiting.      MUSCULOSKELETAL:  Negative for myalgias.      NEUROLOGICAL:  Positive for  weakness ( generalized ).   Negative for headaches.          PMH/FMH/SH:     Last Reviewed on 3/25/2019 09:16 PM by Aroldo Hickey    Past Medical History:                 PAST MEDICAL HISTORY             GYNECOLOGICAL HISTORY:             CURRENT MEDICAL PROVIDERS:    Cardiologist             ADVANCED DIRECTIVES: None         PREVENTIVE HEALTH MAINTENANCE             COLORECTAL CANCER SCREENING: declines colorectal cancer screening, understands reason for testing         Surgical History:     Surgeries:    Appendectomy    Cholecystectomy Other Surgeries     section: X 1;     Hysterectomy: WITH BSO;     Laminectomy: lumbar region; 2016;;; Procedures: Treadmill stress test --NEG;; NEG ECHO AND HOLTER ;;         Family History:         Positive for Coronary Artery Disease.      Positive for Cerebrovascular Accident.  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          Substance Abuse History:     Last Reviewed on 3/25/2019 09:10 PM by Aroldo Hickey    NEGATIVE             Current Problems:     Last Reviewed on 3/25/2019 09:30 PM by Aroldo Hickey    Urinary retention     CHF     Unspecified PVD     NIDDM     HTN     Use of high risk medications     Paroxysmal atrial fibrillation     Osteoarthritis of knee     Depression responsive to treatment     Surgical menopause     Anticoagulation followup         Immunizations:     zzPrevnar-13 2017     zzFluzone pf-quadrivalent 3 and up 2014     Fluzone (3 + years dose) 2010     Fluzone (3 + years dose) 12/15/2011     Pneomovax 2014     Fluzone High-Dose pf  (>=65 yr) 10/7/2016     Fluzone High-Dose pf (>=65 yr) 10/13/2017     Fluzone High-Dose pf (>=65 yr) 9/28/2018         Allergies:     Last Reviewed on 3/25/2019 09:11 PM by Aroldo Hickey    Codeine Sulfate:    Penicillins:        Current Medications:     Last Reviewed on 3/25/2019 09:19 PM by Aroldo Hickey    Klonopin 2mg Tablet 1 tab HS     Digoxin 125mcg Tablet 1 tab daily     Venlafaxine HCl 75mg Capsules, Extended Release 1 cap daily     Glyburide 5mg Tablet Take 1 tablet(s) by mouth bid     Warfarin Sodium 2mg Tablet Take 1 tablet(s) by mouth daily     Warfarin Sodium 3mg Tablet take 1.5mg  on M/W/F and 2mg on Tu/Th/Sat/Sun.     Potassium Chloride 20mEq Tablets, Extended Release 2 tablets BID     Tamsulosin HCl 0.4mg Capsules 1 tab daily     Mag-Ox 400 400mg Tablet 1 po bid     O-2 2 L PER NC AT HS     Aspirin (ASA) 81mg Chewable Tablet one a day     Bumetanide 2mg Tablet 1 tab bid     Acetaminophen 650mg Tablets, Extended Release     bisacodyl 10mg prn     Metoprolol Tartrate 50mg Tablet 1 tab bid         OBJECTIVE:        Vitals:         Current: 3/25/2019 3:27:16 PM    Ht:  5 ft, 5.5 in;  Wt: 249.8 lbs;  BMI: 40.9    T: 97.8 F (oral);  BP: 141/70 mm Hg (left arm, sitting);  P: 97 bpm (left arm (BP Cuff), sitting);  sCr: 0.77 mg/dL;  GFR: 84.49        Exams:     PHYSICAL EXAM:     GENERAL: vital signs recorded - well developed, well nourished;  no apparent distress;     NECK: trachea is midline; thyroid is non-palpable;     RESPIRATORY: normal respiratory rate and pattern with no distress; normal breath sounds with no rales, rhonchi, wheezes or rubs;     CARDIOVASCULAR: normal rate; rhythm is regular;  no systolic murmur; trace pedal edema;     LYMPHATIC: no enlargement of cervical or facial nodes;     MUSCULOSKELETAL: gait: uses a walker;     NEUROLOGIC: GROSSLY INTACT;     PSYCHIATRIC:  appropriate affect and demeanor; normal speech pattern; grossly normal memory;         ASSESSMENT            250.00   E11.9  NIDDM              DDx:     401.1   I10  HTN              DDx:     296.25   F34.1  Depression responsive to treatment              DDx:         ORDERS:         Lab Orders:       [id number]  DIAB1 - [facility] LIPID,CMP, A1C: 51004, 28111, 86203  (Send-Out)         APPTO  Appointment need  (In-House)         51698  BDCBC - [facility] CBC with 3 part diff  (Send-Out)         66337  TSH - [facility] TSH  (Send-Out)                   PLAN:          NIDDM     LABORATORY:  Labs ordered to be performed today include Diabetes Panel 1; CMP, Lipid, A1C.      FOLLOW-UP: Schedule a follow-up visit in 6 months..            Orders:       [id number]  DIAB1 - [facility] LIPID,CMP, A1C: 60781, 17590, 31645  (Send-Out)         APPTO  Appointment need  (In-House)            HTN     LABORATORY:  Labs ordered to be performed today include CBC and TSH.            Orders:       [id number]  BDCBC - [facility] CBC with 3 part diff  (Send-Out)         23222  TSH - [facility] TSH  (Send-Out)               Patient Recommendations:        For  NIDDM:     Schedule a follow-up visit in 6 months.                APPOINTMENT INFORMATION:        Monday Tuesday Wednesday Thursday Friday Saturday Sunday            Time:___________________AM  PM   Date:_____________________             CHARGE CAPTURE           **Please note: ICD descriptions below are intended for billing purposes only and may not represent clinical diagnoses**        Primary Diagnosis:         250.00 NIDDM            E11.9    Type 2 diabetes mellitus without complications              Orders:          [id number]   Office/outpatient visit; established patient, level 4  (In-House)             APPTO   Appointment need  (In-House)           401.1 HTN            I10    Essential (primary) hypertension    296.25 Depression responsive to treatment            F34.1    Dysthymic disorder

## 2021-05-18 NOTE — PROGRESS NOTES
Annie Friend  1950     Office/Outpatient Visit    Visit Date:  10:16 am    Provider: Christine Kurtz N.P. (Assistant: Clarisse Lopez LPN)    Location: [facility]        Electronically signed by Christine Kurtz N.P. on  2020 10:37:04 AM                             Subjective:        CC: Ca is a 70 year old White female.  UTI; Audio only [phone]        HPI:           Urinary tract infection, site not specified noted.  This began within the last 3 days.  Associated symptoms include fever to 100 degrees,  flank pain, urgency and urinary frequency.  She denies associated hematuria, nausea, vaginal discharge or vomiting.  Ca states that she had multiple prior episodes of similar discomfort.  Medical history is pertinent for recurrent UTIs.  Treatments tried thus far include Increasing fluid intake.  Treatment effectivenss has been generally ineffective.      ROS:     CONSTITUTIONAL:  Positive for fever ( low grade ).      CARDIOVASCULAR:  Negative for chest pain, orthopnea, paroxysmal nocturnal dyspnea and pedal edema.      RESPIRATORY:  Negative for dyspnea.      GASTROINTESTINAL:  Negative for abdominal pain, constipation, diarrhea, nausea and vomiting.      GENITOURINARY:  Positive for dysuria, frequent UTI's and frequent urination.      PSYCHIATRIC:  Negative for anxiety, depression, and sleep disturbances.          Past Medical History / Family History / Social History:         Last Reviewed on 2020 10:33 AM by Christine Kurtz    Past Medical History:                 PAST MEDICAL HISTORY             GYNECOLOGICAL HISTORY:             CURRENT MEDICAL PROVIDERS:    Cardiologist             ADVANCED DIRECTIVES: None         PREVENTIVE HEALTH MAINTENANCE             COLORECTAL CANCER SCREENING: declines colorectal cancer screening, understands reason for testing     Hepatitis C Medicare Screening: was last done      MAMMOGRAM: was  last done  with normal results     PAP SMEAR: No longer indicated due to age and history         Surgical History:     Surgeries:    Appendectomy    Cholecystectomy Other Surgeries     section: X 1;     Hysterectomy: WITH BSO;     Laminectomy: lumbar region; 2016;;; Procedures: Treadmill stress test --NEG;; NEG ECHO AND HOLTER ;;         Family History:         Positive for Coronary Artery Disease.      Positive for Cerebrovascular Accident.  Father:  at age 84;  Valve surgery;  Dementia     Mother:  at age 73; Cause of death was CHF         Social History:     Occupation:    Retired     Marital Status:          Tobacco/Alcohol/Supplements:     Last Reviewed on 2020 10:33 AM by Christine Kurtz    Tobacco: She has a past history of cigarette smoking; quit date:  18.          Substance Abuse History:     Last Reviewed on 2020 10:33 AM by Christine Kurtz    NEGATIVE         Mental Health History:     Last Reviewed on 2020 10:33 AM by Christine Kurtz        Communicable Diseases (eg STDs):     Last Reviewed on 2020 10:33 AM by Christine Kurtz        Current Problems:     Last Reviewed on 2020 10:33 AM by Christine Kurtz    Bilateral primary osteoarthritis of knee    Paroxysmal atrial fibrillation    Other long term (current) drug therapy    Essential (primary) hypertension    Long term (current) use of anticoagulants    Type 2 diabetes mellitus without complications    Peripheral vascular disease, unspecified    Chronic diastolic (congestive) heart failure        Immunizations:     zzPrevnar-13 2017    zzFluzone pf-quadrivalent 3 and up 2014    Fluzone (3 + years dose) 2010    Fluzone (3 + years dose) 12/15/2011    Pneomovax 2014    Fluzone High-Dose pf (>=65 yr) 10/7/2016    Fluzone High-Dose pf (>=65 yr) 10/13/2017    Fluzone High-Dose pf (>=65 yr) 2018    Fluzone High-Dose pf (>=65 yr) 10/10/2019         Allergies:     Last Reviewed on 11/11/2020 10:33 AM by Christine Kurtz    Codeine Sulfate:      Penicillins:          Current Medications:     Last Reviewed on 11/11/2020 10:33 AM by Christine Kurtz    Potassium Chloride 20 mEq oral Tablet, Extended Release Particles/Crystals [2 tablets BID]    clonazePAM 2 mg oral tablet [Take 1 tablet by mouth three times daily as needed]    venlafaxine 75 mg oral Capsule, Extended Release 24 hr [Take 1 capsule by mouth once daily]    warfarin 2 mg oral tablet [TAKE 1.5MG BY MOUTH ON MONDAY, WEDNESDAY, AND FRIDAY AND 2MG ON TUESDAY, THURSDAY, SATURDAY, AND SUNDAY]    warfarin 3 mg oral tablet [TAKE 1/2 (ONE-HALF) TABLET BY MOUTH ON MONDAY, WEDNESDAY, FRIDAY, AND 2 MG ON TUESDAY, THURSDAY, SATURDAY, AND SUNDAY.]    glyBURIDE 5 mg oral tablet [Take 1 tablet by mouth twice daily]    Metoprolol Tartrate 50 mg oral tablet [1 tab bid]    bisacodyl 10mg prn     Acetaminophen 650 mg oral tablet, extended release    Mag-Ox 400 400mg Tablet [1 po bid]    Bumetanide 2 mg oral tablet [1 tab bid]    digoxin 125 mcg (0.125 mg) oral tablet [Take 1 tablet by mouth once daily]        Objective:        Exams:     PHYSICAL EXAM:     GENERAL: no apparent distress;     NEUROLOGIC: GROSSLY INTACT     PSYCHIATRIC:  appropriate affect and demeanor; normal speech pattern; grossly normal memory;         Assessment:         N39.0   Urinary tract infection, site not specified           ORDERS:         Meds Prescribed:       [New Rx] Bactrim -160 mg oral tablet [take 1 tablet by mouth bid x 7 days], #14 (fourteen) tablets, Refills: 0 (zero)                 Plan:         Urinary tract infection, site not specified    Telehealth: Verbal consent obtained for visit to occur via phone call; Staff, other than provider, present during telephone visit include Annie FRYE; Total time spent was 6 minutes; [id number]--Adfoswsjf E/M 5-10 minutes     FOLLOW-UP: Advised to call if  there is no improvement 3 days.  :Schedule follow-up appointments on a p.r.n. basis.:.            Prescriptions:       [New Rx] Bactrim -160 mg oral tablet [take 1 tablet by mouth bid x 7 days], #14 (fourteen) tablets, Refills: 0 (zero)             Patient Recommendations:        For  Urinary tract infection, site not specified:    Follow-up by phone if no improvement in 3 days. Schedule follow-up appointments as needed.                APPOINTMENT INFORMATION:        Monday Tuesday Wednesday Thursday Friday Saturday Sunday            Time:___________________AM  PM   Date:_____________________             Charge Capture:         Primary Diagnosis:     N39.0  Urinary tract infection, site not specified           Orders:      [id number]  Phys/QHP telephone evaluation 5-10 min  (In-House)

## 2021-05-18 NOTE — PROGRESS NOTES
Annie Friend 1950     Office/Outpatient Visit    Visit Date: Tue, Jul 10, 2018 03:49 pm    Provider: Aroldo Hickey MD (Assistant: Ashley Escamilla)    Location: [facility]        Electronically signed by Aroldo Hickey MD on  07/11/2018 10:01:03 AM                             SUBJECTIVE:        CC:     Ca is a 68 year old White female.  The patient is accompanied into the exam room by her daughter.          HPI:         PHQ-9 Depression Screening: Completed form scanned and in chart; Total Score 8 Alcohol Consumption Screening: Completed form scanned and in chart; Total Score 2         Additionally, she presents with history of hTN.  her current cardiac medication regimen includes a diuretic, a beta-blocker, and potassium.  Ca does not check her blood pressure other than at her clinic appointments.  Compliance with treatment has been good.          Complaint of unspecified PVD-right leg..  The symptom began several months ago.  ESPERANZA 0.65 ON THE RIGHT.  WOUNDS HAVE HEALED         Complaint of cHF..  The symptom began years ago.  The severity is of moderate intensity.  HAD PULM EDEMA WHILE IN THE HOSPITAL.  STILL HAS SOME EDEMA.  NEEDS F/U WITH CARDIOLOGY.          Complaint of urinary retention..  The symptom began several weeks ago.  The severity is of moderate intensity.  BEGAN AFTER CATHETER REMOVAL, MED HELPING.      ROS:     CONSTITUTIONAL:  Positive for fatigue.   Negative for chills or fever.      CARDIOVASCULAR:  Positive for pedal edema ( moderate ).   Negative for chest pain or palpitations.      RESPIRATORY:  Negative for recent cough, dyspnea and change in her resp status from discharge from hospital.      GASTROINTESTINAL:  Negative for abdominal pain, nausea and vomiting.      MUSCULOSKELETAL:  Negative for myalgias.      NEUROLOGICAL:  Positive for weakness ( generalized ).   Negative for headaches.          PMH/FMH/SH:     Last Reviewed on 7/10/2018 04:20 PM by Aroldo Hickey  Jesus    Past Medical History:                 PAST MEDICAL HISTORY             GYNECOLOGICAL HISTORY:        Last mammogram was  Hospitalizations:    Pneumonia last admit date              ADVANCED DIRECTIVES: None         Surgical History:     Surgeries:    Appendectomy    Cholecystectomy Other Surgeries     section: X 1;     Hysterectomy: WITH BSO;     Laminectomy: lumbar region; 2016;;; Procedures: colonoscopy DECLINES;; Treadmill stress test --NEG;; NEG ECHO AND HOLTER ;;         Family History:         Positive for Coronary Artery Disease.      Positive for Cerebrovascular Accident.  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          Substance Abuse History:     Last Reviewed on 7/10/2018 04:20 PM by Aroldo Hickey    NEGATIVE             Current Problems:     Last Reviewed on 2018 09:42 AM by Aroldo Hickey    Urinary retention     CHF     Unspecified PVD-right leg     Unspecified PVD     NIDDM     HTN     Use of high risk medications     Paroxysmal atrial fibrillation     Osteoarthritis of knee     Depression responsive to treatment     Surgical menopause     Screening for depression     Anticoagulation followup         Immunizations:     zzPrevnar-13 2017     zzFluzone pf-quadrivalent 3 and up 2014     Fluzone (3 + years dose) 2010     Fluzone (3 + years dose) 12/15/2011     Pneomovax 2014     Fluzone High-Dose pf (>=65 yr) 10/7/2016     Fluzone High-Dose pf (>=65 yr) 10/13/2017         Allergies:     Last Reviewed on 7/10/2018 04:20 PM  by Arolod Hickey    Codeine Sulfate:    Penicillins:        Current Medications:     Last Reviewed on 7/10/2018 04:28 PM by Aroldo Hickey    Venlafaxine HCl 75mg Capsules, Extended Release 1 cap daily     Glyburide 5mg Tablet Take 1 tablet(s) by mouth bid     Klonopin 2mg Tablet 1 tab HS     Warfarin Sodium 1mg Tablet 1 qd     Warfarin Sodium 2.5mg Tablet One PO QD     Warfarin Sodium 3mg Tablet 1.5mg M & F 3mg rest of week.     Magnesium 400 MG bid     Aspirin (ASA) 81mg Chewable Tablet one a day     Atorvastatin Calcium 40mg Tablet 1 tab daily     Mag-Ox 400 400mg Tablet 1 po bid     Potassium Chloride 20mEq Tablets, Extended Release 2 tablets BID     Digoxin 125mcg Tablet 1 tab daily     Metoprolol Tartrate 25mg Tablet 1/2 tab daily     Mirtazapine 7.5mg Tablet Take 1 tablet(s) by mouth at bedtime     Tamsulosin HCl 0.4mg Capsules 1 tab daily     Ammonium Lactate 12% Lotion Apply sufficient amount to affected area tid and rub in     Lasix 40mg Tablets 1 tab bid     Acetaminophen 650mg Tablets, Extended Release     bisacodyl 10mg prn         OBJECTIVE:        Vitals:         Current: 7/10/2018 3:57:54 PM    Ht:  5 ft, 5.5 in;  Wt: 232.7 lbs;  BMI: 38.1    T: 98.3 F (oral);  BP: 139/92 mm Hg (left arm, sitting);  P: 80 bpm (left arm (BP Cuff), sitting);  sCr: 0.77 mg/dL;  GFR: 83.13        Exams:     PHYSICAL EXAM:     GENERAL: vital signs recorded - well developed, well nourished;  no apparent distress;     NECK: trachea is midline; thyroid is non-palpable;     RESPIRATORY: normal respiratory rate and pattern with no distress; normal breath sounds with no rales, rhonchi, wheezes or rubs;     CARDIOVASCULAR: normal rate; rhythm is regular;  no systolic murmur; 2+ pedal edema;     LYMPHATIC: no enlargement of cervical or facial nodes;     MUSCULOSKELETAL: gait: uses a walker;     NEUROLOGIC: GROSSLY INTACT;     PSYCHIATRIC:  appropriate affect and demeanor; normal speech pattern; grossly normal  memory;         Lab/Test Results:             Coumadin (text dose):  2mg daily/jam (07/10/2018),     INR:  2.6 (07/10/2018),             ASSESSMENT           428.0   I50.32  CHF              DDx:     401.1   I10  HTN              DDx:     443.9   I73.9  Unspecified PVD-right leg              DDx:     788.29   R33.8  Urinary retention              DDx:     V79.0   Z13.89  Screening for depression              DDx:     V58.61   Z79.01  Anticoagulation followup              DDx:         ORDERS:         Lab Orders:       [id number]  PT/INR  (In-House)         APPTO  Appointment need  (In-House)           Procedures Ordered:       [id number]  Collection of capillary blood specimen (eg, finger, heel, ear stick)  (In-House)         REFER  Referral to Specialist or Other Facility  (Send-Out)           Other Orders:         Depression screen positive and follow up plan documented  (In-House)                   PLAN:          CHF         WILL GET HER BACK IN WITH HER CARDIOLOGIST.      REFERRALS:  Referral initiated to a cardiologist ( at [facility]  MIPS Positive Depression Screen: CURRENTLY ON MEDS     FOLLOW-UP: Schedule a follow-up visit in 1 month..            Orders:       REFER  Referral to Specialist or Other Facility  (Send-Out)           Depression screen positive and follow up plan documented  (In-House)         APPTO  Appointment need  (In-House)            HTN         MEDICATIONS: (no change to current medication regimen)          Unspecified PVD-right leg         WILL DISCUSS FURTHER AT NEXT VISIT AND PROBABLY REFER TO VASCULAR          Urinary retention         MEDICATIONS: (no change to current medication regimen)     CONSIDER UROLOGY EVAL             Other Orders:       [id number]  Preventive medicine, established patient, age 65+ years  (In-House)         42599  PT/INR  (In-House)         77054  Collection of capillary blood specimen (eg, finger, heel, ear stick)  (In-House)           Patient Recommendations:         For  CHF:     I also recommend WILL GET HER BACK IN WITH HER CARDIOLOGIST..  Schedule a follow-up visit in 1 month.                APPOINTMENT INFORMATION:        Monday Tuesday Wednesday Thursday Friday Saturday Sunday            Time:___________________AM  PM   Date:_____________________         For  Unspecified PVD-right leg:     I also recommend WILL DISCUSS FURTHER AT NEXT VISIT AND PROBABLY REFER TO VASCULAR.          For  Urinary retention:     I also recommend CONSIDER UROLOGY EVAL.              CHARGE CAPTURE           **Please note: ICD descriptions below are intended for billing purposes only and may not represent clinical diagnoses**        Primary Diagnosis:         428.0 CHF            I50.32    Chronic diastolic (congestive) heart failure              Orders:          [id number]   Office/outpatient visit; established patient, level 4  (In-House)                Depression screen positive and follow up plan documented  (In-House)             APPTO   Appointment need  (In-House)           401.1 HTN            I10    Essential (primary) hypertension    443.9 Unspecified PVD-right leg            I73.9    Peripheral vascular disease, unspecified    788.29 Urinary retention            R33.8    Other retention of urine    V79.0 Screening for depression            Z13.89    Encounter for screening for other disorder    V58.61 Anticoagulation followup            Z79.01    Long term (current) use of anticoagulants        Other Orders:           [id number]   Preventive medicine, established patient, age 65+ years  (In-House)             69410   PT/INR  (In-House)             50975   Collection of capillary blood specimen (eg, finger, heel, ear stick)  (In-House)           ADDENDUMS:      ____________________________________    Addendum: 07/12/2018 09:23 AM -          Visit Note Faxed to:        User Entered Recipient; Number [phone]

## 2021-05-18 NOTE — PROGRESS NOTES
Annie Friend  1950     Office/Outpatient Visit    Visit Date: Mon, Jan 13, 2020 03:29 pm    Provider: Aroldo Hickey MD (Assistant: Arlene Rahman MA)    Location: [facility]        Electronically signed by Aroldo Hickey MD on  01/18/2020 12:01:55 PM                             Subjective:        CC: WARFARIN 1.5 M,W,F - 2MG T, THU, SAT, Maria Ines is a 69 year old White female.  This is a follow-up visit.  CHECK UP;         HPI:           Patient to be evaluated for dysthymic disorder.  This is a routine follow-up.  Current medications include an antidepressant and an anxiolytic.  DOING WELL ON CURRENT MEDS           Additionally, she presents with history of essential (primary) hypertension.  her current cardiac medication regimen includes a diuretic, a beta-blocker, and potassium.  Compliance with treatment has been good.  Pat does not check her blood pressure other than at her clinic appointments.            Dx with type 2 diabetes mellitus without complications; specifically, this is type 2, non-insulin requiring diabetes.  Compliance with treatment has been good.  Current meds include none, was on insulin in patient.  She does not perform home blood glucose monitoring.  Most recent lab results include Systolic BP:  135 (03/16/2018), Diastolic BP:  83 (06/12/2018), Hemoglobin A1c:  10.9 (%) (10/10/2019), Triglycerides:  189 (mg/dL) (01/24/2018), Foot Exam (Annual):  01/13/2020 (01/13/2020).      ROS:     CONSTITUTIONAL:  Negative for chills and fever.      E/N/T:  Negative for ear pain, frequent rhinorrhea and sore throat.      CARDIOVASCULAR:  Positive for palpitations ( OCC, BRIEF ) and pedal edema ( mild ).   Negative for chest pain.      RESPIRATORY:  Negative for recent cough and dyspnea.      GASTROINTESTINAL:  Negative for abdominal pain, nausea and vomiting.      NEUROLOGICAL:  Negative for headaches.          Past Medical History / Family History / Social History:          Last Reviewed on 2020 03:53 PM by Aroldo Hickey    Past Medical History:                 PAST MEDICAL HISTORY             GYNECOLOGICAL HISTORY:             CURRENT MEDICAL PROVIDERS:    Cardiologist             ADVANCED DIRECTIVES: None         PREVENTIVE HEALTH MAINTENANCE             COLORECTAL CANCER SCREENING: declines colorectal cancer screening, understands reason for testing     Hepatitis C Medicare Screening: was last done      PAP SMEAR: No longer indicated due to age and history         Surgical History:     Surgeries:    Appendectomy    Cholecystectomy Other Surgeries     section: X 1;     Hysterectomy: WITH BSO;     Laminectomy: lumbar region; 2016;;; Procedures: Treadmill stress test --NEG;; NEG ECHO AND HOLTER ;;         Family History:         Positive for Coronary Artery Disease.      Positive for Cerebrovascular Accident.  Father:  at age 84;  Valve surgery;  Dementia     Mother:  at age 73; Cause of death was CHF         Social History:     Occupation:    Retired     Marital Status:          Tobacco/Alcohol/Supplements:     Last Reviewed on 2020 03:53 PM by Aroldo Hickey    Tobacco: She has a past history of cigarette smoking; quit date:  18.          Substance Abuse History:     Last Reviewed on 2020 03:53 PM by Aroldo Hickey    NEGATIVE         Current Problems:     Last Reviewed on 2020 03:53 PM by Aroldo Hickey    Dysthymic disorder    Depression responsive to treatment    Osteoarthritis of knee    Bilateral primary osteoarthritis of knee    Use of high risk medications    Paroxysmal atrial fibrillation    Paroxysmal atrial fibrillation    Other long term (current) drug therapy    HTN    Essential (primary) hypertension    Long term (current) use of anticoagulants    Anticoagulation followup    NIDDM    Type 2 diabetes mellitus without complications    Peripheral vascular disease,  unspecified    Unspecified PVD    CHF    Chronic diastolic (congestive) heart failure        Immunizations:     zzPrevnar-13 6/12/2017    zzFluzone pf-quadrivalent 3 and up 11/6/2014    Fluzone (3 + years dose) 11/24/2010    Fluzone (3 + years dose) 12/15/2011    Pneomovax 11/6/2014    Fluzone High-Dose pf (>=65 yr) 10/7/2016    Fluzone High-Dose pf (>=65 yr) 10/13/2017    Fluzone High-Dose pf (>=65 yr) 9/28/2018    Fluzone High-Dose pf (>=65 yr) 10/10/2019        Allergies:     Last Reviewed on 1/13/2020 03:53 PM by Aroldo Hickey    Codeine Sulfate:      Penicillins:          Current Medications:     Last Reviewed on 1/13/2020 03:53 PM by Aroldo Hickey    Potassium Chloride 20 mEq oral Tablet, Extended Release Particles/Crystals [2 tablets BID]    clonazePAM 2 mg oral tablet [TAKE 1 TABLET BY MOUTH THREE TIMES DAILY AS NEEDED]    aspirin 81 mg oral tablet,chewable [one a day]    venlafaxine 75 mg oral Capsule, Extended Release 24 hr [TAKE 1 CAPSULE BY MOUTH ONCE DAILY]    warfarin 3 mg oral tablet [take 1.5mg  on M/W/F and 2mg on Tu/Th/Sat/Sun.]    warfarin 2 mg oral tablet [take 1.5mg on M/W/F and 2mg on Tu/Th/Sat/Sun]    Glyburide 5 mg oral tablet [Take 1 tablet(s) by mouth bid]    Metoprolol Tartrate 50 mg oral tablet [1 tab bid]    bisacodyl 10mg prn     Acetaminophen 650 mg oral tablet, extended release    Mag-Ox 400 400mg Tablet [1 po bid]    O-2 2 L PER NC AT HS     Bumetanide 2 mg oral tablet [1 tab bid]    digoxin 125 mcg (0.125 mg) oral tablet [TAKE 1 TABLET BY MOUTH ONCE DAILY]        Objective:        Vitals:         Current: 1/13/2020 3:37:25 PM    Ht:  5 ft, 5.5 in;  Wt: 254.4 lbs;  BMI: 41.7T: 97 F (oral);  BP: 127/96 mm Hg (left arm, sitting);  P: 79 bpm (left arm (BP Cuff), sitting);  sCr: 0.83 mg/dL;  GFR: 78.99        Exams:     PHYSICAL EXAM:     GENERAL: vital signs recorded - well developed, well nourished;  no apparent distress;     NECK: trachea is midline; thyroid is  non-palpable;     RESPIRATORY: normal respiratory rate and pattern with no distress; normal breath sounds with no rales, rhonchi, wheezes or rubs;     CARDIOVASCULAR: normal rate; rhythm is regular;  no systolic murmur; 1+ pedal edema;     GASTROINTESTINAL: nontender;     LYMPHATIC: no enlargement of cervical or facial nodes; no supraclavicular nodes;     NEUROLOGIC: GROSSLY INTACT;     PSYCHIATRIC:  appropriate affect and demeanor; normal speech pattern; grossly normal memory;     Left foot exam    Protective sensation using Monofilament test: NORMAL sensation. Patient detects .07 grams of force which is considered normal.    Vascular deficit noted in the posterior tibial artery    Skin is intact without sores or ulcers    Right foot exam    Protective sensation using Monofilament test: NORMAL sensation. Patient detects .07 grams of force which is considered normal.    Vascular deficit noted in the posterior tibial artery    Skin is intact without sores or ulcers         Assessment:         F34.1   Dysthymic disorder       I10   Essential (primary) hypertension       E11.9   Type 2 diabetes mellitus without complications       Z79.899   Other long term (current) drug therapy           ORDERS:         Meds Prescribed:       [Refilled] clonazePAM 2 mg oral tablet [TAKE 1 TABLET BY MOUTH THREE TIMES DAILY AS NEEDED], #90 (ninety) tablets, Refills: 2 (two)         Lab Orders:       APPTO  Appointment need  (In-House)            00928  Drug test prsmv qual dir optical obs per day  (Send-Out)    Pt could not void.          Other Orders:       2028F  Foot examination performed (includes examination through visual inspection, sensory exam with monofilament, and pulse exam - report when any of the three components are completed) (DM)4  (In-House)                      Plan:         Dysthymic disorder        FOLLOW-UP: Schedule a follow-up visit in 4 months.:.  Controlled substance documentation: Bobby reviewed; prior drug  screen consistent; consent is reviewed and signed and on the chart.  She is aware of risk of addiction on this medication, understands that she will need to follow up for a review every 3 months and her medications will be adjusted or decreased as deemed appropriate at each visit.  No history of drug or alcohol abuse.  No concerns about diversion or abuse. She denies side effects related to the medication.  She is aware that she may be called in for pill counts.  The dosing of this medication will be reviewed on a regular basis and reduced if possible..  Ongoing use of a controlled substance is necessary for this patient to have a normal quality of life           Prescriptions:       [Refilled] clonazePAM 2 mg oral tablet [TAKE 1 TABLET BY MOUTH THREE TIMES DAILY AS NEEDED], #90 (ninety) tablets, Refills: 2 (two)           Orders:       APPTO  Appointment need  (In-House)              Essential (primary) hypertension        MEDICATIONS: (no change to current medication regimen)         Type 2 diabetes mellitus without complications        WILL REPEAT LABS AT NEXT VISIT AND ADJUST MEDS AS INDICATED           Orders:       2028F  Foot examination performed (includes examination through visual inspection, sensory exam with monofilament, and pulse exam - report when any of the three components are completed) (DM)4  (In-House)              Other long term (current) drug therapy    LABORATORY:  Labs ordered to be performed today include Drug screen.            Orders:       [id number]  Drug test prsmv qual dir optical obs per day  (Send-Out)    Pt could not void.              Patient Recommendations:        For  Dysthymic disorder:    Schedule a follow-up visit in 4 months.                APPOINTMENT INFORMATION:        Monday Tuesday Wednesday Thursday Friday Saturday Sunday            Time:___________________AM  PM   Date:_____________________             Charge Capture:         Primary Diagnosis:     F34.1  Dysthymic  disorder           Orders:      67479  Office/outpatient visit; established patient, level 4  (In-House)            APPTO  Appointment need  (In-House)              I10  Essential (primary) hypertension     E11.9  Type 2 diabetes mellitus without complications           Orders:      2028F  Foot examination performed (includes examination through visual inspection, sensory exam with monofilament, and pulse exam - report when any of the three components are completed) (DM)4  (In-House)              Z79.899  Other long term (current) drug therapy

## 2021-05-18 NOTE — PROGRESS NOTES
Annie Friend 1950     Office/Outpatient Visit    Visit Date: Fri, Mar 16, 2018 03:50 pm    Provider: Aroldo Hickey MD (Assistant: Baylee Garcia MA)    Location: [facility]        Electronically signed by Aroldo Hickey MD on  03/17/2018 08:44:35 AM                             SUBJECTIVE:        CC:     Ca is a 67 year old White female.  (Pt discontinued baby aspirin) Discuss Metformin;         HPI:         Patient to be evaluated for nIDDM.  Specifically, this is type 2, non-insulin requiring diabetes.  Compliance with treatment has been good.  Current meds include an oral hypoglycemic.  Most recent lab results include Hemoglobin A1c:  7.2 (%) (01/24/2018), Foot Exam (Annual):  01/24/2018 (01/24/2018).  In regard to preventative care, she performs foot self-exams several times per month and her last ophthalmology exam was in ADVISED TO DO SO.  HAS STOPPED METFORMIN AND WOUD RATHER TRY SOMETHING ELSE         Additionally, she presents with history of hTN.  her current cardiac medication regimen includes a diuretic and an ACE inhibitor.  Ca does not check her blood pressure other than at her clinic appointments.  Compliance with treatment has been good.      ROS:     CONSTITUTIONAL:  Positive for unintentional weight gain ( 18 pounds ).   Negative for chills or fever.      CARDIOVASCULAR:  Positive for palpitations ( OCC, BRIEF ) and pedal edema ( moderate; WORSENING ).   Negative for chest pain, orthopnea or paroxysmal nocturnal dyspnea.      RESPIRATORY:  Positive for dyspnea ( with moderate exertion ).   Negative for recent cough.      GASTROINTESTINAL:  Negative for abdominal pain, nausea and vomiting.      MUSCULOSKELETAL:  Negative for myalgias.      INTEGUMENTARY/BREAST:  Positive for OPEN SORE LEFT GREAT TOE .  STATES IMPROVING.      NEUROLOGICAL:  Negative for headaches.          PMH/FMH/SH:     Last Reviewed on 3/16/2018 04:05 PM by Aroldo Hickey    Past Medical  History:                 PAST MEDICAL HISTORY             GYNECOLOGICAL HISTORY:        Last mammogram was 2013             ADVANCED DIRECTIVES: None         Surgical History:     Surgeries:    Appendectomy    Cholecystectomy Other Surgeries     section: X 1;     Hysterectomy: WITH BSO;     Laminectomy: lumbar region; 2016;;; Procedures: colonoscopy DECLINES;; Treadmill stress test 2008--NEG;; NEG ECHO AND HOLTER ;;         Family History:         Positive for Coronary Artery Disease.      Positive for Cerebrovascular Accident.          Social History:     Occupation: Nurse.   [employer]     Marital Status:          Tobacco/Alcohol/Supplements:     Last Reviewed on 3/16/2018 04:05 PM by Aroldo Hickey    Tobacco: Current Smoker: She currently smokes every day, 1/2 pack per day.          Substance Abuse History:     Last Reviewed on 3/16/2018 04:05 PM by Aroldo Hickey    NEGATIVE             Current Problems:     Last Reviewed on 3/16/2018 04:07 PM by Aroldo Hickey    Hand edema     NIDDM     HTN     Use of high risk medications     Paroxysmal atrial fibrillation     Osteoarthritis of knee     Depression responsive to treatment     Cigarette smoking     Surgical menopause     Open wound of toe     Anticoagulation followup         Immunizations:     zzPrevnar-13 2017     zzFluzone pf-quadrivalent 3 and up 2014     Fluzone (3 + years dose) 2010     Fluzone (3 + years dose) 12/15/2011     Pneomovax 2014     Fluzone High-Dose pf (>=65 yr) 10/7/2016     Fluzone High-Dose pf (>=65 yr) 10/13/2017         Allergies:     Last Reviewed on 3/16/2018 04:05 PM by Aroldo Hickey    Codeine Sulfate:    Penicillins:        Current Medications:     Last Reviewed on 3/16/2018 04:06 PM by Aroldo Hickey    Lasix 40mg Tablets 1/2 tab QOD     Warfarin Sodium 2mg Tablet Take 1 tablet(s) by mouth daily     Lisinopril 5mg Tablet ONE A DAY      "Klonopin 2mg Tablet 1 tab HS     Diclofenac Sodium 75mg Tablets, Enteric Coated 1 tab bid with food     Warfarin Sodium 2.5mg Tablet One PO QD     Venlafaxine HCl 150mg Capsules, Extended Release 1 tab daily     Metformin HCl 500mg Tablet 1 tab bid     Warfarin Sodium 3mg Tablet 1.5mg M & F 3mg rest of week.     Lopressor 100mg Tablet Take 1 tablet(s) by mouth bid     Digoxin 250mcg Tablet 1 tab daily     Magnesium 400 MG bid     Aspirin (ASA) 81mg Chewable Tablet one a day         OBJECTIVE:        Vitals:         Current: 3/16/2018 3:56:58 PM    Ht:  5 ft, 5.5 in;  Wt: 261 lbs;  BMI: 42.8    T: 97.3 F (oral);  BP: 135/84 mm Hg (left arm, sitting);  P: 89 bpm (left arm (BP Cuff), sitting);  sCr: 0.75 mg/dL;  GFR: 90.85    O2 Sat: 76 % (room air)        Repeat: O-2 SAT 87% BY ME          Exams:     PHYSICAL EXAM:     GENERAL: vital signs recorded - well developed, well nourished;  no apparent distress;     RESPIRATORY: normal appearance and symmetric expansion of chest wall; normal respiratory rate and pattern with no distress; rales (\"crackles\") present in the bases;  MILD;     CARDIOVASCULAR: normal rate; rhythm is regular but with frequent extra systoles;  no systolic murmur; 2+ pedal edema;     NEUROLOGIC: GROSSLY INTACT;     PSYCHIATRIC:  appropriate affect and demeanor; normal speech pattern; grossly normal memory;         ASSESSMENT           782.3   R60.0  Edema CONCERNED RE POSSIBLE CHF              DDx:     250.00   E11.9  NIDDM              DDx:     401.1   F41.1   I10  HTN              DDx:         ORDERS:         Meds Prescribed:       Refill of: Lasix (Furosemide) 40mg Tablet 1 tab bid  #60 (Sixty) tablet(s) Refills: 0       Glyburide 5mg Tablet Take 1 tablet(s) by mouth bid  #60 (Sixty) tablet(s) Refills: 2                 PLAN:          Edema         MEDICATIONS: I will increase the dose of her a diuretic.      RECOMMENDATIONS given include: elevation of the legs as much as possible and GO TO ER IF " FEELING WORSE.      REFERRAL:  Referral initiated to a cardiologist ( at [facility] ).      FOLLOW-UP: Schedule a follow-up visit in 2 months.      WE WILL GET HER BACK INTO SEE HER CARDIOLOGIST ASAP.            Prescriptions:       Refill of: Lasix (Furosemide) 40mg Tablet 1 tab bid  #60 (Sixty) tablet(s) Refills: 0          NIDDM           Prescriptions:       Glyburide 5mg Tablet Take 1 tablet(s) by mouth bid  #60 (Sixty) tablet(s) Refills: 2          HTN         MEDICATIONS: (no change to current medication regimen)             Patient Recommendations:        For  Edema:     Elevate the swollen extremity as much as possible to reduce swelling.  Schedule a follow-up visit in 2 months.              CHARGE CAPTURE           **Please note: ICD descriptions below are intended for billing purposes only and may not represent clinical diagnoses**        Primary Diagnosis:         782.3 Edema            R60.0    Localized edema              Orders:          47350   Office/outpatient visit; established patient, level 4  (In-House)           250.00 NIDDM            E11.9    Type 2 diabetes mellitus without complications    401.1 HTN            F41.1    Generalized anxiety disorder           I10    Essential (primary) hypertension        ADDENDUMS:      ____________________________________    Addendum: 03/19/2018 09:47 AM - Emilee Carroll         Visit Note Faxed to:        Marlo Cisneros  (Cardiology); Number [phone]     Health Summary Faxed to:        Marlo Cisneros  (Cardiology); Number [phone]

## 2021-05-18 NOTE — PROGRESS NOTES
Annie Friend  1950     Office/Outpatient Visit    Visit Date: Thu, Mar 11, 2021 02:46 pm    Provider: Aorldo Hickey MD (Assistant: Leslie Haque MA)    Location: [facility]        Electronically signed by Aroldo Hickey MD on  03/11/2021 08:57:22 PM                             Subjective:        CC: Phone Call [phone] is a 70 year old White female.  This is a follow-up visit.  Today's encounter is being done with a telehealth visit. She has consented verbally with two witnesses for todays treatment. Todays visit is being conducted by audio only. Individuals present during the telemedicine consultation include AROLDO SHARMA MD         HPI:           Complaint of paroxysmal atrial fibrillation..  The symptom began years ago.  The severity is of moderate intensity.  FOLLOWED BY CARDIOLOGY.  STABLE ON CURRENT MEDS           In regard to the essential (primary) hypertension, her current cardiac medication regimen includes a diuretic, a beta-blocker, and potassium.  Compliance with treatment has been good.  She has not kept a blood pressure diary, but states that pressures have been well controlled.            Additionally, she presents with history of type 2 diabetes mellitus without complications.  specifically, this is type 2, non-insulin requiring diabetes.  Compliance with treatment has been good.  Current meds include none, was on insulin in patient.  She reports home blood glucose readings have been fairly good, with average fasting glucoses running in the 120-150 mg/dL range.  Most recent lab results include Diastolic BP:  83 (06/12/2018), Hemoglobin A1c:  10.9 (%) (10/10/2019), Foot Exam (Annual):  01/13/2020 (01/13/2020).  STATES SHE IS NOW TAKING MEDS AS DIRECTED     ROS:     CONSTITUTIONAL:  Negative for chills and fever.      E/N/T:  Negative for ear pain, frequent rhinorrhea and sore throat.      CARDIOVASCULAR:  Positive for  palpitations ( OCC, BRIEF ) and pedal edema ( mild ).   Negative for chest pain.      RESPIRATORY:  Negative for recent cough and dyspnea.      GASTROINTESTINAL:  Negative for abdominal pain, nausea and vomiting.      NEUROLOGICAL:  Negative for headaches.          Past Medical History / Family History / Social History:         Last Reviewed on 3/11/2021 03:20 PM by Aroldo Hickey    Past Medical History:                 PAST MEDICAL HISTORY             GYNECOLOGICAL HISTORY:             CURRENT MEDICAL PROVIDERS:    Cardiologist             ADVANCED DIRECTIVES: None         PREVENTIVE HEALTH MAINTENANCE             COLORECTAL CANCER SCREENING: declines colorectal cancer screening, understands reason for testing     Hepatitis C Medicare Screening: was last done      MAMMOGRAM: was last done  with normal results     PAP SMEAR: No longer indicated due to age and history         Surgical History:     Surgeries:    Appendectomy    Cholecystectomy Other Surgeries     section: X 1;     Hysterectomy: WITH BSO;     Laminectomy: lumbar region; 2016;;; Procedures: Treadmill stress test --NEG;; NEG ECHO AND HOLTER ;;         Family History:         Positive for Coronary Artery Disease.      Positive for Cerebrovascular Accident.  Father:  at age 84;  Valve surgery;  Dementia     Mother:  at age 73; Cause of death was CHF         Social History:     Occupation:    Retired     Marital Status:          Tobacco/Alcohol/Supplements:     Last Reviewed on 3/11/2021 03:20 PM by Aroldo Hickey    Tobacco: She has a past history of cigarette smoking; quit date:  18.          Substance Abuse History:     Last Reviewed on 3/11/2021 03:20 PM by Aroldo Hickey    NEGATIVE         Mental Health History:     Last Reviewed on 2020 10:33 AM by Christine Kurtz        Communicable Diseases (eg STDs):     Last Reviewed on 2020 10:33 AM by Jerardo  Christine        Current Problems:     Last Reviewed on 3/11/2021 03:20 PM by Aroldo Hickey    Bilateral primary osteoarthritis of knee    Paroxysmal atrial fibrillation    Other long term (current) drug therapy    Essential (primary) hypertension    Long term (current) use of anticoagulants    Type 2 diabetes mellitus without complications    Peripheral vascular disease, unspecified    Chronic diastolic (congestive) heart failure        Immunizations:     zzPrevnar-13 6/12/2017    zzFluzone pf-quadrivalent 3 and up 11/6/2014    Fluzone (3 + years dose) 11/24/2010    Fluzone (3 + years dose) 12/15/2011    Pneomovax 11/6/2014    Fluzone High-Dose pf (>=65 yr) 10/7/2016    Fluzone High-Dose pf (>=65 yr) 10/13/2017    Fluzone High-Dose pf (>=65 yr) 9/28/2018    Fluzone High-Dose pf (>=65 yr) 10/10/2019        Allergies:     Last Reviewed on 3/11/2021 03:20 PM by Aroldo Hickey    Codeine Sulfate:      Penicillins:          Current Medications:     Last Reviewed on 3/11/2021 03:20 PM by Aroldo Hickey    potassium chloride 20 mEq oral tablet, extended release [Take 2 tablets by mouth twice daily]    clonazePAM 2 mg oral tablet [Take 1 tablet by mouth three times daily as needed]    venlafaxine 75 mg oral Capsule, Extended Release 24 hr [Take 1 capsule by mouth once daily]    warfarin 3 mg oral tablet [TAKE 1/2 (ONE-HALF) TABLET BY MOUTH ON MONDAY, WEDNESDAY, FRIDAY, AND 2 MG ON TUESDAY, THURSDAY, SATURDAY, AND SUNDAY.]    warfarin 2 mg oral tablet [TAKE 1 & 1/2 (ONE & ONE-HALF) MG BY MOUTH ONCE DAILY ON MONDAY, WEDNESDAY, AND FRIDAY AND 2MG ON TUESDAY, THURSDAY, SATURDAY, AND SUNDAY]    glyBURIDE 5 mg oral tablet [Take 1 tablet by mouth twice daily]    Metoprolol Tartrate 50 mg oral tablet [1 tab bid]    bisacodyl 10mg prn     Acetaminophen 650 mg oral tablet, extended release    Mag-Ox 400 400mg Tablet [1 po bid]    Bumetanide 2 mg oral tablet [1 tab bid]    digoxin 125 mcg (0.125 mg) oral  tablet [Take 1 tablet by mouth once daily]        Assessment:         E11.9   Type 2 diabetes mellitus without complications       I10   Essential (primary) hypertension       I48.0   Paroxysmal atrial fibrillation           ORDERS:         Other Orders:       DLEYE  Dilated Eye Exam  (Send-Out)                      Plan:         Type 2 diabetes mellitus without complications        RECOMMENDATIONS: instructed in use of home glucose checks, adherance to Low carb, NCS diet diet, and annual eye exams.      FOLLOW-UP: Schedule a follow-up visit in 4 months.      WILL REPEAT LABS AT NEXT VISIT AND ADJUST MEDS AS INDICATED Telehealth: Verbal consent obtained for visit to occur via phone call; Total time spent was 5:15 minutes; [id number]--Mhjiyxdda E/M 5-10 minutes           Orders:       DLEYE  Dilated Eye Exam  (Send-Out)              Essential (primary) hypertension        MEDICATIONS: (no change to current medication regimen)     RECOMMENDATIONS given include: perform routine monitoring of blood pressure with home blood pressure cuff.          Paroxysmal atrial fibrillation        PLANS PER CARDIOLLOGY             Patient Recommendations:        For  Type 2 diabetes mellitus without complications:    Obtain instruction in the proper use of a home blood glucose monitor. Follow a diabetic diet as directed. Have an annual eye exam.  Schedule a follow-up visit in 4 months.          For  Essential (primary) hypertension:    Begin monitoring your blood pressure by brief nurse visits at our office, a home blood pressure monitor, or by checking on the machines in pharmacies or stores.  Keep a log of the readings.          For  Paroxysmal atrial fibrillation:    I also recommend PLANS PER CARDIOLLOGY.              Charge Capture:         Primary Diagnosis:     E11.9  Type 2 diabetes mellitus without complications           Orders:      [id number]  Phys/QHP telephone evaluation 5-10 min  (In-House)              I10  Essential (primary)  hypertension     I48.0  Paroxysmal atrial fibrillation

## 2021-05-18 NOTE — PROGRESS NOTES
Annie Friend 1950     Office/Outpatient Visit    Visit Date: Thu, Oct 10, 2019 03:18 pm    Provider: Aroldo Hickey MD (Assistant: Arlene Rahman MA)    Location: [facility]        Electronically signed by Aroldo Hickey MD on  10/10/2019 05:48:30 PM                             SUBJECTIVE:        CC: (NOT TAKING ASA, TAMSULOSIN)        Warfarin dosage     2mg Sat, Sun Tues and Thurs, 1.5mg Mon, Wed and Fri        Ca is a 69 year old White female.  This is a follow-up visit.  [facility];         HPI:         Ca is here for a Medicare wellness visit.  The required HRA questions are integrated within this visit note. Family medical history and individual medical/surgical history were reviewed and updated.  A current height, weight, BMI, blood pressure, and pulse were recorded in the vitals section of the note and have been reviewed. Patient's medications, including supplements, were recorded in the chart and reviewed.  Current providers and suppliers were reviewed and updated.          Self-Assessment of Health: She rates her health as fair. She rates her confidence of being able to control/manage most of her health problems as very confident. Her physical/emotional health has limited her social activites quite a bit.  A review of possible cognitive impairment was performed and the following was noted: Memory Impairment Screen is normal.  A review of functional ability, including bathing, dressing, walking, and urine/bowel continence as well as level of safety was performed and was found to be negative.  Falls Risk: Has not had any falls or only one fall without injury in the past year.  She denies having trouble hearing the TV/radio when others do not, having to strain to hear or understand conversations and wearing hearing aid(s).  Concerning home safety, she reports that at home she DOES have adequate lighting, a skid resistant shower/tub, handrails on stairs, functioning smoke alarms and  absence of throw rugs, but not grab bars in the bath.          Immunization Status: ** >10 years since last Td booster; Physical Activity: She never excercises.; Type of diet patient normally eats is described as well-balanced with fruits and vegetables Tobacco: She has a past history of cigarette smoking; quit date:  .  Preventative Health updated today         PHQ-9 Depression Screening: Completed form scanned and in chart; Total Score 13     ROS:     CONSTITUTIONAL:  Negative for chills and fever.      EYES:  Negative for blurred vision and eye drainage.      E/N/T:  Negative for ear pain, frequent rhinorrhea and sore throat.      CARDIOVASCULAR:  Positive for palpitations ( OCC, BRIEF ) and pedal edema ( mild ).   Negative for chest pain.      RESPIRATORY:  Negative for recent cough and dyspnea.      GASTROINTESTINAL:  Negative for abdominal pain, anorexia, constipation, diarrhea, nausea and vomiting.      GENITOURINARY:  Negative for dysuria and hematuria.      MUSCULOSKELETAL:  Positive for arthralgias (KNEES, FOLLOWED BY ORTHO).   Negative for myalgias.      NEUROLOGICAL:  Negative for headaches.      PSYCHIATRIC:  Positive for depression and feelings of stress.   Negative for suicidal thoughts.          PMH/FMH/SH:     Last Reviewed on 10/10/2019 03:31 PM by Aroldo Hickey    Past Medical History:                 PAST MEDICAL HISTORY             GYNECOLOGICAL HISTORY:             CURRENT MEDICAL PROVIDERS:    Cardiologist             ADVANCED DIRECTIVES: None         PREVENTIVE HEALTH MAINTENANCE             COLORECTAL CANCER SCREENING: declines colorectal cancer screening, understands reason for testing     PAP SMEAR: No longer indicated due to age and history         Surgical History:     Surgeries:    Appendectomy    Cholecystectomy Other Surgeries     section: X 1;     Hysterectomy: WITH BSO;     Laminectomy: lumbar region; 2016;;; Procedures: Treadmill stress test --NEG;;  NEG ECHO AND HOLTER 2008;;         Family History:         Positive for Coronary Artery Disease.      Positive for Cerebrovascular Accident.  Father:  at age 84;  Valve surgery;  Dementia     Mother:  at age 73; Cause of death was CHF         Social History:     Occupation:    Retired     Marital Status:          Tobacco/Alcohol/Supplements:     Last Reviewed on 10/10/2019 03:27 PM by Aroldo Hickey    Tobacco: She has a past history of cigarette smoking; quit date:  18.          Substance Abuse History:     Last Reviewed on 10/10/2019 03:26 PM by Aroldo Hickey    NEGATIVE             Current Problems:     Last Reviewed on 10/10/2019 03:45 PM by Aroldo Hickey    Urinary retention     CHF     Unspecified PVD     NIDDM     HTN     Use of high risk medications     Paroxysmal atrial fibrillation     Osteoarthritis of knee     Depression responsive to treatment     Screening for depression     Anticoagulation followup         Immunizations:     zzPrevnar-13 2017     zzFluzone pf-quadrivalent 3 and up 2014     Fluzone (3 + years dose) 2010     Fluzone (3 + years dose) 12/15/2011     Pneomovax 2014     Fluzone High-Dose pf (>=65 yr) 10/7/2016     Fluzone High-Dose pf (>=65 yr) 10/13/2017     Fluzone High-Dose pf (>=65 yr) 2018         Allergies:     Last Reviewed on 10/10/2019 03:27 PM by Aroldo Hickey    Codeine Sulfate:    Penicillins:        Current Medications:     Last Reviewed on 3/25/2019 09:19 PM by Aroldo Hickey    Klonopin 2mg Tablet Take 1 tablet(s) by mouthTID PRN     Digoxin 125mcg Tablet 1 tab daily     Venlafaxine HCl 75mg Capsules, Extended Release 1 cap daily     Glyburide 5mg Tablet Take 1 tablet(s) by mouth bid     Potassium Chloride 20mEq Tablets, Extended Release 2 tablets BID     Warfarin Sodium 3mg Tablet take 1.5mg  on // and 2mg on //Sat/Sun.     Warfarin Sodium 2mg Tablet take 1.5mg on // and  2mg on Tu/Th/Sat/Sun     Metoprolol Tartrate 50mg Tablet 1 tab bid     Tamsulosin HCl 0.4mg Capsules 1 tab daily     Mag-Ox 400 400mg Tablet 1 po bid     Bumetanide 2mg Tablet 1 tab bid     O-2 2 L PER NC AT HS     Aspirin (ASA) 81mg Chewable Tablet one a day     Acetaminophen 650mg Tablets, Extended Release     bisacodyl 10mg prn         OBJECTIVE:        Vitals:         Current: 10/10/2019 3:37:31 PM    Ht:  5 ft, 5.5 in;  Wt: 251.2 lbs;  BMI: 41.2    T: 98 F (oral);  BP: 111/59 mm Hg (left arm, sitting);  P: 88 bpm (left arm (BP Cuff), sitting);  sCr: 1 mg/dL;  GFR: 65.21    VA: 20/100 OD, 20/50 OS (with correction)        Exams:     PHYSICAL EXAM:     GENERAL: vital signs recorded - well developed, well nourished;  no apparent distress;     EYES: conjunctiva and cornea are normal;     E/N/T:  normal EACs, TMs, nasal/oral mucosa, teeth, gingiva, and oropharynx;     NECK: trachea is midline; thyroid is non-palpable; carotid exam reveals no bruits;     RESPIRATORY: normal respiratory rate and pattern with no distress; normal breath sounds with no rales, rhonchi, wheezes or rubs;     CARDIOVASCULAR: normal rate; rhythm is regular;  no systolic murmur; 1+ pedal edema;     GASTROINTESTINAL: nontender;     LYMPHATIC: no enlargement of cervical or facial nodes; no supraclavicular nodes;     MUSCULOSKELETAL: gait: wheelchair-bound;  normal overall tone     NEUROLOGIC: GROSSLY INTACT;     PSYCHIATRIC:  appropriate affect and demeanor; normal speech pattern; grossly normal memory;         Procedures:     Vaccination against other viral diseases, Influenza     1. Influenza high dose 0.5 ml unit dose, KG, ABN signed given IM in the left upper arm;  lot number [id number]; expires 05/16/2020 Regarding contraindications to an Influenza vaccine: Denies moderate/severe illness with/without fever; serious reaction to eggs, egg proteins, gentamicin, gelatin, arginine, neomycin or polymixin; serious reaction after recieving previous  influenza vaccines; and history of Guillain-Concord Syndrome.              ASSESSMENT           V70.0   Z00.00  Health checkup              DDx:     427.31   I48.0  Paroxysmal atrial fibrillation              DDx:     401.1   I10  HTN              DDx:     250.00   E11.9  NIDDM              DDx:     V73.89   Z11.59  Screening test for viral diseases - other              DDx:     V04.81   Z23  Vaccination against other viral diseases, Influenza              DDx:     V79.0   Z13.89  Screening for depression              DDx:         ORDERS:         Lab Orders:       83190  MG - [facility] Magnesium, Serum  (Send-Out)         37913  CBC [facility] CBC with 3 part diff  (Send-Out)         82322  TSH - [facility] TSH  (Send-Out)         40221  [facility], with micro  (Send-Out)         56491  DIAB[address] [place] [address] LIPID,CMP, A1C: 22655, 49176, 01591  (Send-Out)           [facility] Hepatitis C AB (Medicare Screen)  (Send-Out)           Procedures Ordered:       [id number]  Fluzone High Dose  (In-House)           Annual wellness visit, includes a PPPS, subsequent visit  (In-House)           Other Orders:         Depression screen positive and follow up plan documented  (In-House)         1101F  Pt screen for fall risk; document no falls in past year or only 1 fall w/o injury in past year (LISA)  (In-House)           Administration of influenza virus vaccine (x1)                 PLAN:          Health checkup         COUNSELING provided on: fall prevention, healthy eating habits, Medicare Preventative Services Guide given to patient., tobacco avoidance, use of seat belts, Advance Directive info given., and ADVISED TO SEE AN EYE DOCTOR AND DENTIST REGULARLY.      FOLLOW-UP: Schedule a follow-up visit in 2 months.      ORDER GIVEN FOR A SHINGRX MIPS Positive Depression Screen: Suicide Risk Assessment completed--denies suicidal/homicidal ideation; Stable on medications. No suicidal ideation.            Orders:          Depression screen positive and follow up plan documented  (In-House)         1101F  Pt screen for fall risk; document no falls in past year or only 1 fall w/o injury in past year (LISA)  (In-House)           Annual wellness visit, includes a PPPS, subsequent visit  (In-House)            Paroxysmal atrial fibrillation     LABORATORY:  Labs ordered to be performed today include Magnesium level.            Orders:       13423  MG - [facility] Magnesium, Serum  (Send-Out)            HTN     LABORATORY:  Labs ordered to be performed today include CBC, TSH, and urinalysis with micro.            Orders:       [id number]  BDCBC - [facility] CBC with 3 part diff  (Send-Out)         83262  TSH - [facility] TSH  (Send-Out)         06155  BDUAM [facility] Urinalysis, automated, with micro  (Send-Out)            NIDDM     LABORATORY:  Labs ordered to be performed today include Diabetes Panel 1; CMP, Lipid, A1C.            Orders:       [id number]  DIAB[address] [place] [address] LIPID,CMP, A1C: 52932, 46898, 49027  (Send-Out)            Screening test for viral diseases - other     LABORATORY:  Labs ordered to be performed today include Hepatitis C Ab (Medicare Screen).            Orders:         [facility] Hepatitis C AB (Medicare Screen)  (Send-Out)            Vaccination against other viral diseases, Influenza         IMMUNIZATIONS given today: Influenza HIGH Dose.            Orders:       [id number]  Fluzone High Dose  (In-House)                     Administration of influenza virus vaccine (x1)             Patient Recommendations:        For  Health checkup:         Regularly exercise within recommended guidelines, especially to maintain balance. Remove obstacles in walkways at home.  Use non-skid material for bathtub safety.  Remove loose throw rugs from floor. Use nightlights in bedrooms, hallways, and bathrooms.    Limit dietary intake of fat (especially saturated fat) and cholesterol.  Eat a variety of foods, including plenty of fruits, vegetables, and grain  containg fiber, limit fat intake to 30% of total calories. Balance caloric intake with energy expended.    Avoid using tobacco products.  If you already use tobacco, it is recommended that you stop.  A variety of methods have been shown to be effective in smoking cessation.    Always use shoulder/lap restraints when driving or riding in a vehicle, even those equipped with air bags.  Schedule a follow-up visit in 2 months.              CHARGE CAPTURE           **Please note: ICD descriptions below are intended for billing purposes only and may not represent clinical diagnoses**        Primary Diagnosis:         V70.0 Health checkup            Z00.00    Encounter for general adult medical examination without abnormal findings              Orders:             Depression screen positive and follow up plan documented  (In-House)             1101F   Pt screen for fall risk; document no falls in past year or only 1 fall w/o injury in past year (LISA)  (In-House)                Annual wellness visit, includes a PPPS, subsequent visit  (In-House)           427.31 Paroxysmal atrial fibrillation            I48.0    Paroxysmal atrial fibrillation    401.1 HTN            I10    Essential (primary) hypertension    250.00 NIDDM            E11.9    Type 2 diabetes mellitus without complications    V73.89 Screening test for viral diseases - other            Z11.59    Encounter for screening for other viral diseases    V04.81 Vaccination against other viral diseases, Influenza            Z23    Encounter for immunization              Orders:          62587   Fluzone High Dose  (In-House)                                           Administration of influenza virus vaccine (x1)         V79.0 Screening for depression            Z13.89    Encounter for screening for other disorder

## 2021-05-18 NOTE — PROGRESS NOTES
Annie Friend 1950     Office/Outpatient Visit    Visit Date: Tue, Jun 12, 2018 02:48 pm    Provider: Clarisse Echeverria N.P. (Assistant: Georgia Gomez MA)    Location: [facility]        Electronically signed by Clarisse Echeverria N.P. on  06/12/2018 11:07:01 PM                             SUBJECTIVE:        CC:     Ca is a 68 year old White female.  She is here today following a transition of care from an inpatient hospital: [facility] FOR SOA AND MRSA IN BLOOD. The patient was admitted on 5-14-18 AND DISCHARGED 5-30-18. Our office called the patient within 48 hours of discharge and scheduled the follow-up appointment..  PT IS NOT TAKING WARFARIN 2.5MG OR THE 3MG, GLYBURIDE, LISINOPRIL, DICLOFENAC;         HPI:         Ca presents in follow up from hospital admission. She was admitted to the hospital on 5-14-18 and discharged on 5-30-18.  She was diagnosed with acute hypoxemic/hypercapnic resp failure due to pulmonary edema, pneumonia, CJF, HORTENSIA/bilateral lower ext cellulitus /PVD.  She had a blistered area on her right lower posterior leg in May.  Then developed some increased resp issues/cough a week before she went to [facility].  Was transferred to [facility].  She was placed on Bipap.  The following lab tests were done: CBC ( WBC 20,000 ), creatinine ( 3.3 ), electrolyte panel ( K 6.3 ), hepatic function panel ( elevated LFT's ), hepatitis panel ( negative ).  The following radiology tests were done: abdominal ultrasound ( RUQ ultrasound was negative ).  The patient received IV medications, which included IV antibiotics/ diuretics/cardizem, breathing treatments, which included oxygen /nebs, and intubation.  The patient's course has improved.  Associated symptoms include cough, lower ext edema and dyspnea.          Current health problems include atrial fibrillation.  Currently, her treatment regimen consists of Coumadin 1mg QD.          In regard to the nIDDM, specifically, this is type 2,  non-insulin requiring diabetes.  Current meds include none, was on insulin in patient.  She does not perform home blood glucose monitoring.  Most recent lab results include Systolic BP:  135 (2018), Diastolic BP:  83 (2018), Hemoglobin A1c:  7.2 (%) (2018), LDL:  80 (mg/dL) (2018), HDL:  32 (mg/dL) (2018), Triglycerides:  189 (mg/dL) (2018), Foot Exam (Annual):  2018 (2018).  She tells me she was dg in 2018, but I see elevated A1C in /also says she can't take metformin and is off glyburide She was given Insulin on 3 occ in the hospital per patient.  She was taken off Glyburide and can't take metformin.      ROS:     CONSTITUTIONAL:  Negative for fever.      CARDIOVASCULAR:  Negative for chest pain.      RESPIRATORY:  Negative for change in her resp status from discharge from hospital.      GASTROINTESTINAL:  Positive for dysphagia ( since coming home, was intubated ).      MUSCULOSKELETAL:  Positive for swelling in fingers, but no pain.      NEUROLOGICAL:  Positive for weakness ( in a wheelchiar here, but at home gets around okay ).          PMH/FMH/SH:     Last Reviewed on 2018 03:27 PM by Clarisse Echeverria    Past Medical History:                 PAST MEDICAL HISTORY             GYNECOLOGICAL HISTORY:        Last mammogram was  Hospitalizations:    Pneumonia last admit date              ADVANCED DIRECTIVES: None         Surgical History:     Surgeries:    Appendectomy    Cholecystectomy Other Surgeries     section: X 1;     Hysterectomy: WITH BSO;     Laminectomy: lumbar region; 2016;;; Procedures: colonoscopy DECLINES;; Treadmill stress test --NEG;; NEG ECHO AND HOLTER ;;         Family History:         Positive for Coronary Artery Disease.      Positive for Cerebrovascular Accident.  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          Substance Abuse History:     Last Reviewed on 3/16/2018 04:05 PM by Aroldo Hickey    NEGATIVE             Immunizations:     zzPrevnar-13 6/12/2017     zzFluzone pf-quadrivalent 3 and up 11/6/2014     Fluzone (3 + years dose) 11/24/2010     Fluzone (3 + years dose) 12/15/2011     Pneomovax 11/6/2014     Fluzone High-Dose pf (>=65 yr) 10/7/2016     Fluzone High-Dose pf (>=65 yr) 10/13/2017         Allergies:     Last Reviewed on 6/12/2018 02:52 PM by Georgia Goemz    Codeine Sulfate:    Penicillins:        Current Medications:     Last Reviewed on 6/12/2018 03:04 PM by Georgia Gomez    Klonopin 2mg Tablet 1 tab HS     Lisinopril 5mg Tablet ONE A DAY     Diclofenac Sodium 75mg Tablets, Enteric Coated 1 tab bid with food     Warfarin Sodium 2.5mg Tablet One PO QD     Warfarin Sodium 3mg Tablet 1.5mg M & F 3mg rest of week. (has only been taking 1 mg of warfarin daily)     Digoxin 125mcg Tablet 1 tab daily     Magnesium 400 MG bid     Aspirin (ASA) 81mg Chewable Tablet one a day     Lasix 40mg Tablets 1 tab bid     Warfarin Sodium 1mg Tablet 1 qd     Venlafaxine HCl 75mg Capsules, Extended Release 1 cap daily     Acetaminophen 650mg Tablets, Extended Release     Atorvastatin Calcium 40mg Tablet 1 tab daily     bisacodyl 10mg prn     Metoprolol Tartrate 25mg Tablet 1/2 tab daily     Mirtazapine 7.5mg Tablet Take 1 tablet(s) by mouth at bedtime     Potassium Chloride 20mEq Tablets, Extended Release 2 tablets BID     Tamsulosin HCl 0.4mg Capsules 1 tab daily         OBJECTIVE:        Vitals:         Current: 6/12/2018 2:51:53 PM    Ht:  5 ft, 5.5 in;  Wt: 232 lbs;  BMI: 38.0    T: 98.7 F (oral);  BP:  136/83 mm Hg (left arm, sitting);  P: 88 bpm (left arm (BP Cuff), sitting);  sCr: 0.75 mg/dL;  GFR: 85.24    O2 Sat: 90 %        Repeat:     3:43:48 PM     P:   80bpm        Exams:     PHYSICAL EXAM:     GENERAL: vital signs recorded - well developed, well nourished;  no apparent distress;     NECK: carotid exam reveals no bruits;     RESPIRATORY: normal respiratory rate and pattern with no distress; normal breath sounds with no rales, rhonchi, wheezes or rubs;     CARDIOVASCULAR: normal rate; rhythm is regular;  no systolic murmur; 1+ pedal edema;     LYMPHATIC: no enlargement of cervical or facial nodes;     BREAST/INTEGUMENT: skin dry on posterior lower leg, skin intact, no redness;     MUSCULOSKELETAL: gait: in a wheelchair;     NEUROLOGIC: GROSSLY INTACT     PSYCHIATRIC:  appropriate affect and demeanor; normal speech pattern; grossly normal memory;         Lab/Test Results:             Coumadin (text dose):  1mg daily/tls (06/12/2018),     INR:  1.2 (06/12/2018),             ASSESSMENT           482.89   J15.9  Bacterial pneumonia, NEC              DDx:     V58.61   Z79.01  Anticoagulation followup              DDx:     250.00   E11.9  NIDDM              DDx:     701.8   L20.89  Dry skin              DDx:     787.29   R13.19  Other dysphagia              DDx:     443.9   I73.9  Unspecified PVD              DDx:         ORDERS:         Meds Prescribed:       Ammonium Lactate 12% Lotion Apply sufficient amount to affected area tid and rub in  #400 ([facility] [place]) gm Refills: 0         Lab Orders:       [id number]  [facility] Basic Metabolic Panel  (Send-Out)         49888  [facility] - [facility] Prothrombin Time PT/INR  (In-House; Stat)         80586  [facility] CBC with 3 part diff  (Send-Out)         APPTO  Appointment need  (In-House)           Procedures Ordered:       [id number]  Collection of capillary blood specimen (eg, finger, heel, ear stick)  (In-House)                   PLAN:          Bacterial pneumonia, NEC reviewed  discharge summary from [facility], will check with [employer] for a portable oxygen tank and call her at  [phone] (according to discharge summary was discharged to [facility], then home, will clarify this with patient) she was accompanied by a family member today     LABORATORY:  Labs ordered to be performed today include CBC.      FOLLOW-UP: Schedule a follow-up appointment in 2 weeks..   appointment to be scheduled with Edelmira           Orders:       [id number]  [facility] CBC with 3 part diff  (Send-Out)         APPTO  Appointment need  (In-House)            Anticoagulation followup to recheck INR one week/consulted with Dr. Bryant /increase coumadin to 2 mg daily     LABORATORY:  Labs ordered to be performed today include INR.            Orders:       [id number]  [facility] - [facility] Prothrombin Time PT/INR  (In-House; Stat)         83315  Collection of capillary blood specimen (eg, finger, heel, ear stick)  (In-House)            NIDDM follow up with dr herrera, reviewed her diabetes flow sheet, check lab today /to start testing her sugars, after lab, will advise on treatment, was advised in hospital it treat with sliding scale insulin     LABORATORY:  Labs ordered to be performed today include basic metabolic panel.            Orders:       [id number]  [facility] Basic Metabolic Panel  (Send-Out)            Dry skin debby asked for rx lotion for her dry skin           Prescriptions:       Ammonium Lactate 12% Lotion Apply sufficient amount to affected area tid and rub in  #400 ([facility] [place]) gm Refills: 0          Other dysphagia discussed referral, to follow up with Dr. herrera          Unspecified PVD due to lower ext ulceration, vascular was consulted while in patient and was advised to follow up OP, can discuss that with her PCP             Patient Recommendations:        For  Bacterial pneumonia, NEC:     Schedule a follow-up visit in 2 weeks.                APPOINTMENT INFORMATION:        Monday Tuesday Wednesday Thursday Friday   Saturday Sunday            Time:___________________AM  PM   Date:_____________________             CHARGE CAPTURE           **Please note: ICD descriptions below are intended for billing purposes only and may not represent clinical diagnoses**        Primary Diagnosis:         482.89 Bacterial pneumonia, NEC            J15.9    Unspecified bacterial pneumonia              Orders:          45107   Transitional care manage service 14 day discharge  (In-House)             APPTO   Appointment need  (In-House)           V58.61 Anticoagulation followup            Z79.01    Long term (current) use of anticoagulants              Orders:          03615   PRO-T - [facility] Prothrombin Time PT/INR  (In-House; Stat)             81395   Collection of capillary blood specimen (eg, finger, heel, ear stick)  (In-House)           250.00 NIDDM            E11.9    Type 2 diabetes mellitus without complications    701.8 Dry skin            L20.89    Other atopic dermatitis    787.29 Other dysphagia            R13.19    Other dysphagia    443.9 Unspecified PVD            I73.9    Peripheral vascular disease, unspecified        ADDENDUMS:      ____________________________________    Addendum: 06/15/2018 10:35 AM - Clarisse Echeverria        She went to [place] for one week after discharge from [facility], then home.

## 2021-06-21 ENCOUNTER — ANTICOAGULATION VISIT (OUTPATIENT)
Dept: FAMILY MEDICINE CLINIC | Age: 71
End: 2021-06-21

## 2021-06-21 DIAGNOSIS — I48.0 PAROXYSMAL ATRIAL FIBRILLATION (HCC): Primary | ICD-10-CM

## 2021-06-21 PROBLEM — I48.91 ATRIAL FIBRILLATION (HCC): Status: ACTIVE | Noted: 2021-06-21

## 2021-06-21 LAB — INR PPP: 2.3 (ref 2–3)

## 2021-06-21 RX ORDER — VENLAFAXINE HYDROCHLORIDE 75 MG/1
CAPSULE, EXTENDED RELEASE ORAL
Qty: 90 CAPSULE | Refills: 0 | Status: SHIPPED | OUTPATIENT
Start: 2021-06-21 | End: 2021-09-20 | Stop reason: SDUPTHER

## 2021-07-01 VITALS
HEIGHT: 66 IN | DIASTOLIC BLOOD PRESSURE: 59 MMHG | BODY MASS INDEX: 40.37 KG/M2 | TEMPERATURE: 98 F | HEART RATE: 88 BPM | WEIGHT: 251.2 LBS | SYSTOLIC BLOOD PRESSURE: 111 MMHG

## 2021-07-01 VITALS
HEIGHT: 66 IN | TEMPERATURE: 98.3 F | DIASTOLIC BLOOD PRESSURE: 92 MMHG | WEIGHT: 232.7 LBS | BODY MASS INDEX: 37.4 KG/M2 | SYSTOLIC BLOOD PRESSURE: 139 MMHG | HEART RATE: 80 BPM

## 2021-07-01 VITALS
HEART RATE: 75 BPM | BODY MASS INDEX: 39.82 KG/M2 | HEIGHT: 66 IN | SYSTOLIC BLOOD PRESSURE: 142 MMHG | DIASTOLIC BLOOD PRESSURE: 76 MMHG

## 2021-07-01 VITALS
OXYGEN SATURATION: 90 % | TEMPERATURE: 98.7 F | HEART RATE: 80 BPM | BODY MASS INDEX: 37.28 KG/M2 | DIASTOLIC BLOOD PRESSURE: 83 MMHG | WEIGHT: 232 LBS | HEIGHT: 66 IN | SYSTOLIC BLOOD PRESSURE: 136 MMHG

## 2021-07-01 VITALS
HEIGHT: 66 IN | TEMPERATURE: 97.3 F | SYSTOLIC BLOOD PRESSURE: 135 MMHG | BODY MASS INDEX: 41.95 KG/M2 | WEIGHT: 261 LBS | HEART RATE: 89 BPM | OXYGEN SATURATION: 76 % | DIASTOLIC BLOOD PRESSURE: 84 MMHG

## 2021-07-01 VITALS
HEART RATE: 79 BPM | SYSTOLIC BLOOD PRESSURE: 140 MMHG | BODY MASS INDEX: 39.82 KG/M2 | DIASTOLIC BLOOD PRESSURE: 78 MMHG | HEIGHT: 66 IN

## 2021-07-01 VITALS
DIASTOLIC BLOOD PRESSURE: 91 MMHG | HEART RATE: 80 BPM | SYSTOLIC BLOOD PRESSURE: 128 MMHG | HEIGHT: 66 IN | BODY MASS INDEX: 39.07 KG/M2

## 2021-07-01 VITALS
SYSTOLIC BLOOD PRESSURE: 107 MMHG | TEMPERATURE: 98.1 F | DIASTOLIC BLOOD PRESSURE: 73 MMHG | BODY MASS INDEX: 38.31 KG/M2 | WEIGHT: 238.4 LBS | HEART RATE: 88 BPM | HEIGHT: 66 IN

## 2021-07-01 VITALS
HEIGHT: 66 IN | SYSTOLIC BLOOD PRESSURE: 140 MMHG | HEART RATE: 83 BPM | DIASTOLIC BLOOD PRESSURE: 80 MMHG | BODY MASS INDEX: 39.82 KG/M2

## 2021-07-01 VITALS — TEMPERATURE: 97.2 F | HEIGHT: 66 IN | BODY MASS INDEX: 39.07 KG/M2

## 2021-07-01 VITALS
TEMPERATURE: 97.8 F | SYSTOLIC BLOOD PRESSURE: 141 MMHG | WEIGHT: 249.8 LBS | HEART RATE: 97 BPM | BODY MASS INDEX: 40.15 KG/M2 | DIASTOLIC BLOOD PRESSURE: 70 MMHG | HEIGHT: 66 IN

## 2021-07-01 VITALS
SYSTOLIC BLOOD PRESSURE: 142 MMHG | HEART RATE: 104 BPM | BODY MASS INDEX: 39.07 KG/M2 | HEIGHT: 66 IN | DIASTOLIC BLOOD PRESSURE: 88 MMHG

## 2021-07-01 VITALS
DIASTOLIC BLOOD PRESSURE: 80 MMHG | SYSTOLIC BLOOD PRESSURE: 128 MMHG | HEIGHT: 66 IN | HEART RATE: 69 BPM | BODY MASS INDEX: 39.07 KG/M2

## 2021-07-02 VITALS
HEART RATE: 79 BPM | DIASTOLIC BLOOD PRESSURE: 96 MMHG | TEMPERATURE: 97 F | HEIGHT: 66 IN | WEIGHT: 254.4 LBS | SYSTOLIC BLOOD PRESSURE: 127 MMHG | BODY MASS INDEX: 40.88 KG/M2

## 2021-07-07 ENCOUNTER — TELEPHONE (OUTPATIENT)
Dept: FAMILY MEDICINE CLINIC | Age: 71
End: 2021-07-07

## 2021-07-07 NOTE — TELEPHONE ENCOUNTER
Caller: Annie Friend    Relationship: Self    Best call back number: [phone]    What medications are you currently taking:   Current Outpatient Medications on File Prior to Visit   Medication Sig Dispense Refill   • clonazePAM (KlonoPIN) 2 MG tablet Take 2 mg by mouth 3 (Three) Times a Day As Needed.  0   • glyBURIDE (DIAbeta) 5 MG tablet Take 5 mg by mouth 2 (Two) Times a Day.  4   • magnesium oxide (MAGOX) 400 (241.3 Mg) MG tablet tablet Take 1 tablet by mouth.     • metoprolol tartrate (LOPRESSOR) 50 MG tablet Take 75 mg by mouth Every 8 (Eight) Hours.     • potassium chloride (K-DUR,KLOR-CON) 20 MEQ CR tablet Take 40 mEq by mouth 2 (Two) Times a Day.  5   • venlafaxine XR (EFFEXOR-XR) 75 MG 24 hr capsule Take 1 capsule by mouth once daily 90 capsule 0   • warfarin (COUMADIN) 2 MG tablet        No current facility-administered medications on file prior to visit.            Which medication are you concerned about: warfarin (COUMADIN) 2 MG tablet    What are your concerns: PATIENT WOULD LIKE TO SWITCH FROM WARFARIN TO ELIQUIS, THE WARFARIN TESTING IS GETTING TOO EXPENSIVE. PLEASE CALL PATIENT TO ADVISE.

## 2021-07-08 NOTE — TELEPHONE ENCOUNTER
Called [facility]. They said they would check with him and they contact pt .   Pt inf.

## 2021-07-08 NOTE — TELEPHONE ENCOUNTER
Please ask her cardiologist about this issue and ask what dose of Eliquis they would like us to put her on.

## 2021-07-12 LAB — INR PPP: 2 (ref 2–3)

## 2021-08-09 ENCOUNTER — TELEPHONE (OUTPATIENT)
Dept: FAMILY MEDICINE CLINIC | Age: 71
End: 2021-08-09

## 2021-08-09 NOTE — TELEPHONE ENCOUNTER
Caller: Annie Friend    Relationship to patient: Self    Best call back number:[phone]    Patient is needing:PATIENT IS VERY UPSET ABOUT PRESCIPTION REFILLS. CALLED IN CLONAZEPAM ON 8-5-21 AND STILL HAS NOT BEEN FILLED. HUB UNABLE TO WARM TRANSFER. PLEASE ADVISE

## 2021-08-09 NOTE — TELEPHONE ENCOUNTER
Caller: Annie Friend    Relationship: Self    Best call back number: [phone]  What is the best time to reach you: ANYTIME     Who are you requesting to speak with (clinical staff, provider,  specific staff member): CLINICAL STAFF OR DR. WALDROP      What was the call regarding: PATIENT HAS BEEN WAITING FOR A WEEK ON CLONAZEPAN/KLONOPIN  PRESCRIPTION WOULD LIKE TO KNOW SOMETHING TODAY    Do you require a callback: YES

## 2021-08-10 RX ORDER — CLONAZEPAM 2 MG/1
2 TABLET ORAL 3 TIMES DAILY PRN
Qty: 21 TABLET | Refills: 0 | Status: SHIPPED | OUTPATIENT
Start: 2021-08-10 | End: 2021-08-16 | Stop reason: SDUPTHER

## 2021-08-10 NOTE — TELEPHONE ENCOUNTER
Refill request for Clonazapam 2mg one tid prn. LV- 03/11/21, next appt 08/12/21. Pt said she has been out. Sent to on call Dr Jacome, Dr Hickey out until 08/11/21.

## 2021-08-10 NOTE — TELEPHONE ENCOUNTER
[facility] pharmacy is asking for a refill of Warfarin 2mg, take 1.5mg m/w/f, 2 mg all other days. Last INR- 07/13/21, repeat in one month. (checks on a home monitor)  Next appt 08/12/21.

## 2021-08-11 ENCOUNTER — TELEPHONE (OUTPATIENT)
Dept: FAMILY MEDICINE CLINIC | Age: 71
End: 2021-08-11

## 2021-08-11 NOTE — TELEPHONE ENCOUNTER
HUB UNABLE TO WARM TRANSFER    Caller: Annie Friend    Relationship: Self    Best call back number: [phone]     What is the best time to reach you: ASAP    Who are you requesting to speak with (clinical staff, provider,  specific staff member): CLINICAL    Do you know the name of the person who called: GHAZAL    What was the call regarding: PATIENT STATED THIS IS VERY, VERY, VERY IMPORTANT AND YOU NEED TO CALL HER BACK IMMEDIATELY    Do you require a callback: YES, PLEASE CALL

## 2021-08-11 NOTE — TELEPHONE ENCOUNTER
Tried to call, no answer, no vm on call back number [phone]. Left message to call back on other number listed in chart [phone].

## 2021-08-11 NOTE — TELEPHONE ENCOUNTER
Pt said when she got her controlled med refill it wasn't for the full month. Explained to pt that a on call doctor usually only fills a short supply. When she runs low Dr Hickey can refill.

## 2021-08-12 RX ORDER — WARFARIN SODIUM 2 MG/1
TABLET ORAL
Qty: 90 TABLET | Refills: 3 | Status: SHIPPED | OUTPATIENT
Start: 2021-08-12 | End: 2021-09-20 | Stop reason: ALTCHOICE

## 2021-08-16 DIAGNOSIS — F41.1 GENERALIZED ANXIETY DISORDER: Primary | ICD-10-CM

## 2021-08-16 NOTE — TELEPHONE ENCOUNTER
Rx Refill Note  Requested Prescriptions     Pending Prescriptions Disp Refills   • clonazePAM (KlonoPIN) 2 MG tablet 90 tablet 0     Sig: Take 1 tablet by mouth 3 (Three) Times a Day As Needed for Anxiety for up to 30 days.     Last office visit with prescribing clinician: 03/11/21  Last filled by on call Dr Jacome for #21 on 08/10/21.  Next office visit with prescribing clinician: 8/30/2021   Lab:      } Benzodiazepines Confirm, Urine - (01/28/2020 17:26)      Destiney Mims LPN  08/16/21, 11:29 EDT

## 2021-08-19 RX ORDER — CLONAZEPAM 2 MG/1
2 TABLET ORAL 3 TIMES DAILY PRN
Qty: 90 TABLET | Refills: 0 | Status: SHIPPED | OUTPATIENT
Start: 2021-08-19 | End: 2021-09-21

## 2021-08-30 ENCOUNTER — TELEPHONE (OUTPATIENT)
Dept: FAMILY MEDICINE CLINIC | Age: 71
End: 2021-08-30

## 2021-08-30 NOTE — TELEPHONE ENCOUNTER
Caller: LEYDI VASQUEZ    Relationship: SPOUSE    Best call back number: [phone]    What is the best time to reach you: ANY    Who are you requesting to speak with (clinical staff, provider,  specific staff member): CLINICAL    What was the call regarding: LEYDI WOULD NOT GIVE ANY INFORMATION AS TO WHAT THE CALL BACK IS REGARDING     Do you require a callback: YES

## 2021-09-08 ENCOUNTER — ANTICOAGULATION VISIT (OUTPATIENT)
Dept: FAMILY MEDICINE CLINIC | Age: 71
End: 2021-09-08

## 2021-09-08 DIAGNOSIS — I48.11 LONGSTANDING PERSISTENT ATRIAL FIBRILLATION (HCC): Primary | ICD-10-CM

## 2021-09-08 LAB — INR PPP: 1.9 (ref 0.9–1.1)

## 2021-09-08 PROCEDURE — 36416 COLLJ CAPILLARY BLOOD SPEC: CPT | Performed by: FAMILY MEDICINE

## 2021-09-08 PROCEDURE — 85610 PROTHROMBIN TIME: CPT | Performed by: FAMILY MEDICINE

## 2021-09-14 ENCOUNTER — OFFICE VISIT (OUTPATIENT)
Dept: FAMILY MEDICINE CLINIC | Age: 71
End: 2021-09-14

## 2021-09-14 ENCOUNTER — LAB (OUTPATIENT)
Dept: LAB | Facility: HOSPITAL | Age: 71
End: 2021-09-14

## 2021-09-14 VITALS
BODY MASS INDEX: 39.76 KG/M2 | DIASTOLIC BLOOD PRESSURE: 75 MMHG | HEART RATE: 106 BPM | SYSTOLIC BLOOD PRESSURE: 107 MMHG | WEIGHT: 242.6 LBS

## 2021-09-14 DIAGNOSIS — E11.9 TYPE 2 DIABETES MELLITUS TREATED WITHOUT INSULIN (HCC): Primary | ICD-10-CM

## 2021-09-14 DIAGNOSIS — K62.3 RECTAL PROLAPSE: ICD-10-CM

## 2021-09-14 DIAGNOSIS — I10 ESSENTIAL HYPERTENSION: ICD-10-CM

## 2021-09-14 DIAGNOSIS — I48.20 CHRONIC ATRIAL FIBRILLATION (HCC): ICD-10-CM

## 2021-09-14 DIAGNOSIS — F34.1 DYSTHYMIC DISORDER: ICD-10-CM

## 2021-09-14 DIAGNOSIS — Z12.31 ENCOUNTER FOR SCREENING MAMMOGRAM FOR BREAST CANCER: ICD-10-CM

## 2021-09-14 PROBLEM — M17.0 PRIMARY OSTEOARTHRITIS OF KNEES, BILATERAL: Status: ACTIVE | Noted: 2019-09-18

## 2021-09-14 PROBLEM — I50.30 DIASTOLIC HEART FAILURE (HCC): Status: ACTIVE | Noted: 2020-02-04

## 2021-09-14 PROCEDURE — 80053 COMPREHEN METABOLIC PANEL: CPT | Performed by: FAMILY MEDICINE

## 2021-09-14 PROCEDURE — 80061 LIPID PANEL: CPT | Performed by: FAMILY MEDICINE

## 2021-09-14 PROCEDURE — 85027 COMPLETE CBC AUTOMATED: CPT | Performed by: FAMILY MEDICINE

## 2021-09-14 PROCEDURE — 99214 OFFICE O/P EST MOD 30 MIN: CPT | Performed by: FAMILY MEDICINE

## 2021-09-14 PROCEDURE — 36415 COLL VENOUS BLD VENIPUNCTURE: CPT | Performed by: FAMILY MEDICINE

## 2021-09-14 PROCEDURE — 84443 ASSAY THYROID STIM HORMONE: CPT | Performed by: FAMILY MEDICINE

## 2021-09-14 PROCEDURE — 83036 HEMOGLOBIN GLYCOSYLATED A1C: CPT | Performed by: FAMILY MEDICINE

## 2021-09-14 RX ORDER — PSEUDOEPHEDRINE HCL 30 MG
100 TABLET ORAL 2 TIMES DAILY
COMMUNITY

## 2021-09-14 RX ORDER — DIGOXIN 125 MCG
125 TABLET ORAL DAILY
COMMUNITY
Start: 2021-04-29 | End: 2021-11-15

## 2021-09-14 RX ORDER — DICLOFENAC SODIUM 75 MG/1
75 TABLET, DELAYED RELEASE ORAL 2 TIMES DAILY
COMMUNITY
End: 2022-08-11

## 2021-09-14 RX ORDER — METOPROLOL SUCCINATE 50 MG/1
75 TABLET, EXTENDED RELEASE ORAL 2 TIMES DAILY
COMMUNITY
Start: 2021-06-17

## 2021-09-14 NOTE — ASSESSMENT & PLAN NOTE
Hypertension is improving with treatment.  Continue current treatment regimen.  Dietary sodium restriction.  Weight loss.  Continue current medications.  Blood pressure will be reassessed in 3 months.

## 2021-09-14 NOTE — PROGRESS NOTES
Chief Complaint  No chief complaint on file.  FOLLOE UP OF CHRONIC PROBLEMS, POSSIBLE RECTAL PROLAPSE   Subjective          Annie Friend presents to [facility]  --NOT CHECKING HER BLOOD SUGARS, DUE FOR LABS  --TOLERATING BP MEDS WITHOUT APPARENT SIDE EFFECTS  --ON COUMADIN FOR ATRIAL FIB BUT WOULD LIKE TO CHANGE TO SOMETHING DIFFERENT LIKE XARELTO OR ELIQUIS  --CONTINUES ON CHRONIC KLONOPIN FOR HER ANXIETY/DEPRESSION, WORSENED AFTER THE PASSING OF HER DAUGHTER  --DUE FOR A MAMMOGRAM.  HAS NOTICED NO LUMPS OR TENDERNESS  --SHE FEELS THAT SHE HAS DEVELOPED A RECTAL PROLAPSE AND WOULD LIKE TO SEE A SURGEON FOR THIS          Allergies   Allergen Reactions   • Codeine Rash   • Penicillins Rash        Health Maintenance Due   Topic Date Due   • MAMMOGRAM  Never done   • URINE MICROALBUMIN  Never done   • DXA SCAN  Never done   • COLORECTAL CANCER SCREENING  Never done   • TDAP/TD VACCINES (1 - Tdap) Never done   • ZOSTER VACCINE (1 of 2) Never done   • Pneumococcal Vaccine 65+ (1 of 2 - PPSV23) 08/07/2017   • DIABETIC FOOT EXAM  Never done   • DIABETIC EYE EXAM  Never done   • HEMOGLOBIN A1C  04/10/2020   • ANNUAL WELLNESS VISIT  10/10/2020        Current Outpatient Medications on File Prior to Visit   Medication Sig   • digoxin (LANOXIN) 125 MCG tablet Take 125 mcg by mouth Daily.   • metoprolol succinate XL (TOPROL-XL) 50 MG 24 hr tablet Take 50 mg by mouth 2 (two) times a day.   • clonazePAM (KlonoPIN) 2 MG tablet Take 1 tablet by mouth 3 (Three) Times a Day As Needed for Anxiety for up to 30 days.   • diclofenac (VOLTAREN) 75 MG EC tablet Take 75 mg by mouth 2 (two) times a day.   • docusate sodium 100 MG capsule Take 100 mg by mouth 2 (two) times a day.   • glyBURIDE (DIAbeta) 5 MG tablet Take 5 mg by mouth 2 (Two) Times a Day.   • potassium chloride (K-DUR,KLOR-CON) 20 MEQ CR tablet Take 40 mEq by mouth 2 (Two) Times a Day.   • venlafaxine XR (EFFEXOR-XR) 75 MG 24 hr capsule  Take 1 capsule by mouth once daily   • warfarin (COUMADIN) 2 MG tablet TAKE 1 & 1/2 (ONE & ONE-HALF) MG BY MOUTH ONCE DAILY ON MONDAY, WEDNESDAY, AND FRIDAY AND 2MG ON TUESDAY, THURSDAY, SATURDAY, AND SUNDAY   • [DISCONTINUED] magnesium oxide (MAGOX) 400 (241.3 Mg) MG tablet tablet Take 1 tablet by mouth.   • [DISCONTINUED] metoprolol tartrate (LOPRESSOR) 50 MG tablet Take 75 mg by mouth Every 8 (Eight) Hours.     No current facility-administered medications on file prior to visit.       Immunization History   Administered Date(s) Administered   • COVID-19 (PFIZER) 03/26/2021, 04/16/2021   • Fluzone High Dose =>65 Years ([facility] ONLY) 10/13/2017   • Influenza, Unspecified 10/10/2019   • PPD Test 05/26/2015   • Pneumococcal Conjugate 13-Valent (PCV13) 06/12/2017       Review of Systems   Constitutional: Negative for appetite change, chills, fatigue and fever.   HENT: Negative for ear pain, rhinorrhea and sore throat.    Respiratory: Negative for cough and shortness of breath.    Cardiovascular: Negative for chest pain, palpitations and leg swelling.   Gastrointestinal: Negative for abdominal distention, abdominal pain, anal bleeding, blood in stool, constipation, diarrhea, nausea, vomiting and GERD.   Genitourinary: Negative for dysuria and hematuria.   Musculoskeletal: Positive for arthralgias. Negative for myalgias.   Neurological: Negative for headache.   Psychiatric/Behavioral: Positive for depressed mood.        Objective     /75 (BP Location: Right arm, Patient Position: Sitting)   Pulse 106   Wt 110 kg (242 lb 9.6 oz)   BMI 39.76 kg/m²       Physical Exam  Vitals and nursing note reviewed.   Constitutional:       General: She is not in acute distress.     Appearance: Normal appearance.   Cardiovascular:      Rate and Rhythm: Normal rate and regular rhythm.      Heart sounds: No murmur heard.     Pulmonary:      Effort: Pulmonary effort is normal.      Breath sounds: Normal breath sounds.   Abdominal:       Palpations: Abdomen is soft.      Tenderness: There is no abdominal tenderness.   Musculoskeletal:         General: No swelling.      Cervical back: Neck supple.      Right lower leg: Edema present.      Left lower leg: Edema present.   Lymphadenopathy:      Cervical: No cervical adenopathy.   Neurological:      General: No focal deficit present.      Mental Status: She is alert.      Cranial Nerves: No cranial nerve deficit.      Coordination: Coordination normal.      Gait: Gait normal.   Psychiatric:         Mood and Affect: Mood normal.         Behavior: Behavior normal.         Result Review :                             Assessment and Plan      Diagnoses and all orders for this visit:    1. Type 2 diabetes mellitus treated without insulin (CMS/[facility]) (Primary)  Assessment & Plan:  Diabetes is unchanged.   Continue current treatment regimen.  Diabetes will be reassessed in 3 months.    Orders:  -     Comprehensive Metabolic Panel  -     Lipid Panel  -     Hemoglobin A1c    2. Essential hypertension  Assessment & Plan:  Hypertension is improving with treatment.  Continue current treatment regimen.  Dietary sodium restriction.  Weight loss.  Continue current medications.  Blood pressure will be reassessed in 3 months.    Orders:  -     CBC (No Diff)  -     TSH    3. Rectal prolapse  Assessment & Plan:  WILL REFER TO GENERAL SURGERY FOR AN EVALUATION AND POSSIBLE COLONOSCOPY     Orders:  -     Ambulatory Referral to General Surgery    4. Encounter for screening mammogram for breast cancer    5. Dysthymic disorder  Assessment & Plan:  Patient's depression is single episode and is moderate without psychosis. Their depression is currently in partial remission and the condition is improving with treatment. This will be reassessed in 3 months. F/U as described:patient will continue current medication therapy   CHRONIC USE OF KLONOPIN IS NECESSARY FOR HER TO HAVE A NEAR NORMAL QUALITY OF LIFE.  .      6. Chronic  atrial fibrillation (CMS/[facility])  Assessment & Plan:  WILL ASK HER CARDIOLOGIST ABOUT POSSIBLY SWITCHING HER TO A DIFFERENT ANTICOAGULANT             Follow Up     Return in about 3 months (around 12/14/2021) for Medicare Wellness.    Patient was given instructions and counseling regarding her condition or for health maintenance advice. Please see specific information pulled into the AVS if appropriate.

## 2021-09-14 NOTE — ASSESSMENT & PLAN NOTE
Patient's depression is single episode and is moderate without psychosis. Their depression is currently in partial remission and the condition is improving with treatment. This will be reassessed in 3 months. F/U as described:patient will continue current medication therapy   CHRONIC USE OF KLONOPIN IS NECESSARY FOR HER TO HAVE A NEAR NORMAL QUALITY OF LIFE.  .

## 2021-09-15 DIAGNOSIS — I48.20 CHRONIC ATRIAL FIBRILLATION (HCC): Primary | ICD-10-CM

## 2021-09-15 LAB
ALBUMIN SERPL-MCNC: 3.8 G/DL (ref 3.5–5.2)
ALBUMIN/GLOB SERPL: 1 G/DL
ALP SERPL-CCNC: 85 U/L (ref 39–117)
ALT SERPL W P-5'-P-CCNC: 11 U/L (ref 1–33)
ANION GAP SERPL CALCULATED.3IONS-SCNC: 10.7 MMOL/L (ref 5–15)
AST SERPL-CCNC: 17 U/L (ref 1–32)
BILIRUB SERPL-MCNC: 0.4 MG/DL (ref 0–1.2)
BUN SERPL-MCNC: 18 MG/DL (ref 8–23)
BUN/CREAT SERPL: 25 (ref 7–25)
CALCIUM SPEC-SCNC: 9.3 MG/DL (ref 8.6–10.5)
CHLORIDE SERPL-SCNC: 91 MMOL/L (ref 98–107)
CHOLEST SERPL-MCNC: 149 MG/DL (ref 0–200)
CO2 SERPL-SCNC: 35.3 MMOL/L (ref 22–29)
CREAT SERPL-MCNC: 0.72 MG/DL (ref 0.57–1)
DEPRECATED RDW RBC AUTO: 41.4 FL (ref 37–54)
ERYTHROCYTE [DISTWIDTH] IN BLOOD BY AUTOMATED COUNT: 12.1 % (ref 12.3–15.4)
GFR SERPL CREATININE-BSD FRML MDRD: 79 ML/MIN/1.73
GLOBULIN UR ELPH-MCNC: 3.9 GM/DL
GLUCOSE SERPL-MCNC: 203 MG/DL (ref 65–99)
HBA1C MFR BLD: 8.36 % (ref 4.8–5.6)
HCT VFR BLD AUTO: 43.9 % (ref 34–46.6)
HDLC SERPL-MCNC: 36 MG/DL (ref 40–60)
HGB BLD-MCNC: 14.6 G/DL (ref 12–15.9)
LDLC SERPL CALC-MCNC: 80 MG/DL (ref 0–100)
LDLC/HDLC SERPL: 2.07 {RATIO}
MCH RBC QN AUTO: 30.9 PG (ref 26.6–33)
MCHC RBC AUTO-ENTMCNC: 33.3 G/DL (ref 31.5–35.7)
MCV RBC AUTO: 93 FL (ref 79–97)
PLATELET # BLD AUTO: 304 10*3/MM3 (ref 140–450)
PMV BLD AUTO: 9.2 FL (ref 6–12)
POTASSIUM SERPL-SCNC: 3.8 MMOL/L (ref 3.5–5.2)
PROT SERPL-MCNC: 7.7 G/DL (ref 6–8.5)
RBC # BLD AUTO: 4.72 10*6/MM3 (ref 3.77–5.28)
SODIUM SERPL-SCNC: 137 MMOL/L (ref 136–145)
TRIGL SERPL-MCNC: 192 MG/DL (ref 0–150)
TSH SERPL DL<=0.05 MIU/L-ACNC: 1.83 UIU/ML (ref 0.27–4.2)
VLDLC SERPL-MCNC: 33 MG/DL (ref 5–40)
WBC # BLD AUTO: 9.83 10*3/MM3 (ref 3.4–10.8)

## 2021-09-15 NOTE — TELEPHONE ENCOUNTER
Called [facility] and talked to Monica. She said to fax them her info and she will have him look at it and decide. Faxed to [phone].

## 2021-09-15 NOTE — TELEPHONE ENCOUNTER
----- Message from Aroldo Hickey MD sent at 9/14/2021  5:13 PM EDT -----  THIS PATIENT TAKES WARFARIN FOR ATRIAL FIBRILLATION BUT WOULD LIKE TO CHANGE TO SOMETHING ELSE LIKE XARELTO OR ELIQUIS.  PLEASE ASK HER CARDIOLOGIST IF THIS IS OK TO DO THIS AND IF SO WHICH MED DOES HE RECOMMEND.  THANKS

## 2021-09-17 NOTE — TELEPHONE ENCOUNTER
Landy at [facility] office said he said it is ok to start Eliquis 5mg one every 12 hours in place of the warfarin.

## 2021-09-20 DIAGNOSIS — F41.1 GENERALIZED ANXIETY DISORDER: ICD-10-CM

## 2021-09-20 RX ORDER — VENLAFAXINE HYDROCHLORIDE 75 MG/1
CAPSULE, EXTENDED RELEASE ORAL
Qty: 90 CAPSULE | Refills: 0 | Status: SHIPPED | OUTPATIENT
Start: 2021-09-20 | End: 2021-12-27

## 2021-09-20 NOTE — TELEPHONE ENCOUNTER
Rx Refill Note  Requested Prescriptions     Pending Prescriptions Disp Refills   • clonazePAM (KlonoPIN) 2 MG tablet [Pharmacy Med Name: clonazePAM 2 MG Oral Tablet] 90 tablet 0     Sig: TAKE 1 TABLET BY MOUTH THREE TIMES DAILY AS NEEDED FOR ANXIETY FOR  UP  TO  30  DAYS      Last office visit with prescribing clinician: 9/14/2021      Next office visit with prescribing clinician: 12/15/2021    LAST FILL-8/19/2021  Janessa Alejandra LPN  09/20/21, 16:19 EDT

## 2021-09-20 NOTE — TELEPHONE ENCOUNTER
given the message, tried to send med to pharmacy but it got flagged due to interaction with voltaren, increased risk of bleeding. Is it ok to accept and send?

## 2021-09-21 RX ORDER — CLONAZEPAM 2 MG/1
TABLET ORAL
Qty: 90 TABLET | Refills: 2 | Status: SHIPPED | OUTPATIENT
Start: 2021-09-21 | End: 2021-12-27

## 2021-09-24 ENCOUNTER — TELEPHONE (OUTPATIENT)
Dept: FAMILY MEDICINE CLINIC | Age: 71
End: 2021-09-24

## 2021-09-24 DIAGNOSIS — E11.9 TYPE 2 DIABETES MELLITUS TREATED WITHOUT INSULIN (HCC): Primary | ICD-10-CM

## 2021-09-24 NOTE — TELEPHONE ENCOUNTER
GLYBURIDE IS NO LONGER GOING TO BE COVERED UNDER PLAN.     ALTERNATIVES COVERED, GLIPIZIDE  AND GLIMEPERIDE.    PLEASE ADVISE, PA OR CHANGE MEDICATION??

## 2021-09-29 RX ORDER — GLIMEPIRIDE 2 MG/1
2 TABLET ORAL 2 TIMES DAILY
Qty: 180 TABLET | Refills: 1 | Status: SHIPPED | OUTPATIENT
Start: 2021-09-29 | End: 2022-07-05

## 2021-10-07 ENCOUNTER — HOSPITAL ENCOUNTER (OUTPATIENT)
Dept: HOSPITAL 49 - FAS | Age: 71
Discharge: HOME | End: 2021-10-07
Attending: SURGERY
Payer: MEDICARE

## 2021-10-07 VITALS — BODY MASS INDEX: 37.5 KG/M2 | WEIGHT: 233.36 LBS | HEIGHT: 66 IN

## 2021-10-07 DIAGNOSIS — K59.00: ICD-10-CM

## 2021-10-07 DIAGNOSIS — R19.7: ICD-10-CM

## 2021-10-07 DIAGNOSIS — Z88.0: ICD-10-CM

## 2021-10-07 DIAGNOSIS — Z79.84: ICD-10-CM

## 2021-10-07 DIAGNOSIS — Z79.899: ICD-10-CM

## 2021-10-07 DIAGNOSIS — C20: Primary | ICD-10-CM

## 2021-10-07 DIAGNOSIS — Z87.891: ICD-10-CM

## 2021-10-07 DIAGNOSIS — K64.4: ICD-10-CM

## 2021-10-07 DIAGNOSIS — Z88.5: ICD-10-CM

## 2021-10-07 DIAGNOSIS — Z79.01: ICD-10-CM

## 2021-10-07 LAB
ALBUMIN SERPL-MCNC: 2.9 G/DL (ref 3.4–5)
ALKALINE PHOSHATASE: 97 U/L (ref 46–116)
ALT SERPL-CCNC: 16 U/L (ref 14–59)
AST: 21 U/L (ref 15–37)
BILIRUBIN - TOTAL: 0.8 MG/DL (ref 0.2–1)
BUN SERPL-MCNC: 15 MG/DL (ref 7–18)
BUN/CREAT RATIO (CALC): 18.5 RATIO
CHLORIDE: 94 MMOL/L (ref 98–107)
CO2 (BICARBONATE): 39 MMOL/L (ref 21–32)
CREATININE: 0.81 MG/DL (ref 0.51–0.95)
GLOBULIN (CALCULATION): 5.4 G/DL
GLUCOSE SERPL-MCNC: 189 MG/DL (ref 74–106)
HCT: 46.4 % (ref 37–47)
HGB BLD-MCNC: 14.6 G/DL (ref 12.5–16)
INR PPP: 1.16 (ref 0.9–1.2)
MCH RBC QN AUTO: 31.1 PG (ref 25–31)
MCHC RBC AUTO-ENTMCNC: 31.5 G/DL (ref 32–36)
MCV: 98.7 FL (ref 78–100)
MPV: 8.6 FL (ref 6–9.5)
PLT: 302 K/UL (ref 150–400)
POTASSIUM: 3.2 MMOL/L (ref 3.5–5.1)
PROTHROMBIN TIME: 14.2 SECONDS (ref 11.8–13.4)
PTT: 33.1 SECONDS (ref 24.4–34.7)
RBC: 4.7 M/UL (ref 4.2–5.4)
RDW: 12.8 % (ref 11.5–14)
TOTAL PROTEIN: 8.3 G/DL (ref 6.4–8.2)
WBC: 16.1 K/UL (ref 4–10.5)

## 2021-10-08 ENCOUNTER — HOSPITAL ENCOUNTER (EMERGENCY)
Dept: HOSPITAL 49 - FER | Age: 71
Discharge: OTHER INSTITUTION | End: 2021-10-08
Payer: MEDICARE

## 2021-10-08 DIAGNOSIS — Z88.5: ICD-10-CM

## 2021-10-08 DIAGNOSIS — I48.91: ICD-10-CM

## 2021-10-08 DIAGNOSIS — R09.02: ICD-10-CM

## 2021-10-08 DIAGNOSIS — R10.30: Primary | ICD-10-CM

## 2021-10-08 DIAGNOSIS — Z20.822: ICD-10-CM

## 2021-10-08 DIAGNOSIS — Z88.0: ICD-10-CM

## 2021-10-08 LAB
ALBUMIN SERPL-MCNC: 2.7 G/DL (ref 3.4–5)
ALKALINE PHOSHATASE: 101 U/L (ref 46–116)
ALT SERPL-CCNC: 18 U/L (ref 14–59)
AMYLASE SERPL-CCNC: 10 U/L (ref 25–115)
AST: 25 U/L (ref 15–37)
BASOPHIL: 0.5 % (ref 0–2)
BILIRUBIN - TOTAL: 1.1 MG/DL (ref 0.2–1)
BUN SERPL-MCNC: 14 MG/DL (ref 7–18)
BUN/CREAT RATIO (CALC): 16.7 RATIO
CHLORIDE: 94 MMOL/L (ref 98–107)
CO2 (BICARBONATE): 38 MMOL/L (ref 21–32)
CORONAVIRUS 2019 SARS-COV-2: NEGATIVE
CREATININE: 0.84 MG/DL (ref 0.51–0.95)
EOSINOPHIL: 0 % (ref 0–7)
GLOBULIN (CALCULATION): 5.2 G/DL
GLUCOSE SERPL-MCNC: 226 MG/DL (ref 74–106)
HCT: 44.4 % (ref 37–47)
HGB BLD-MCNC: 13.8 G/DL (ref 12.5–16)
INFLUENZA A NAA: NEGATIVE
LACTIC ACID: 2.4 MMOL/L (ref 0.4–1.9)
LIPASE: 34 U/L (ref 73–393)
LYMPHOCYTE: 5.4 % (ref 15–48)
MCH RBC QN AUTO: 30.8 PG (ref 25–31)
MCHC RBC AUTO-ENTMCNC: 31.1 G/DL (ref 32–36)
MCV: 99.1 FL (ref 78–100)
MONOCYTE: 6.7 % (ref 0–12)
MPV: 9.1 FL (ref 6–9.5)
NEUTROPHIL: 86.4 % (ref 41–80)
NRBC: 0
PLT: 306 K/UL (ref 150–400)
POTASSIUM: 3.5 MMOL/L (ref 3.5–5.1)
RBC MORPHOLOGY: NORMAL
RBC: 4.48 M/UL (ref 4.2–5.4)
RDW: 13.1 % (ref 11.5–14)
TOTAL PROTEIN: 7.9 G/DL (ref 6.4–8.2)
WBC: 21 K/UL (ref 4–10.5)

## 2021-10-08 PROCEDURE — U0002 COVID-19 LAB TEST NON-CDC: HCPCS

## 2021-11-04 ENCOUNTER — ANTICOAGULATION VISIT (OUTPATIENT)
Dept: FAMILY MEDICINE CLINIC | Age: 71
End: 2021-11-04

## 2021-11-04 DIAGNOSIS — I48.20 CHRONIC ATRIAL FIBRILLATION (HCC): Primary | ICD-10-CM

## 2021-11-15 RX ORDER — DIGOXIN 125 MCG
TABLET ORAL
Qty: 90 TABLET | Refills: 0 | Status: SHIPPED | OUTPATIENT
Start: 2021-11-15 | End: 2022-03-15

## 2021-12-03 ENCOUNTER — TELEPHONE (OUTPATIENT)
Dept: FAMILY MEDICINE CLINIC | Age: 71
End: 2021-12-03

## 2021-12-03 RX ORDER — CLOTRIMAZOLE AND BETAMETHASONE DIPROPIONATE 10; .64 MG/G; MG/G
1 CREAM TOPICAL 2 TIMES DAILY
Qty: 30 G | Refills: 0 | Status: SHIPPED | OUTPATIENT
Start: 2021-12-03

## 2021-12-03 NOTE — TELEPHONE ENCOUNTER
Caller: Annie Friend    Relationship: Self    Best call back number: [phone]     What medication are you requesting: CREAM FOR A RASH    What are your current symptoms: RASH IN PERINEAL AREA    How long have you been experiencing symptoms: 3 DAYS    Have you had these symptoms before:    [] Yes  [x] No    Have you been treated for these symptoms before:   [] Yes  [x] No    If a prescription is needed, what is your preferred pharmacy and phone number: [facility] [address] [place], [place] [address] RHONDA KELLY [facility] [phone] [facility] [phone]      Additional notes:

## 2021-12-03 NOTE — TELEPHONE ENCOUNTER
Pt said she has a red rash that burns in her perineal area. No itching Its been there for about 3 days. She said her  in doing cancer tx 5 days a week and she cant come in.    show

## 2021-12-27 DIAGNOSIS — F41.1 GENERALIZED ANXIETY DISORDER: ICD-10-CM

## 2021-12-27 RX ORDER — CLONAZEPAM 2 MG/1
TABLET ORAL
Qty: 90 TABLET | Refills: 0 | Status: SHIPPED | OUTPATIENT
Start: 2021-12-27 | End: 2022-02-01

## 2021-12-27 RX ORDER — VENLAFAXINE HYDROCHLORIDE 75 MG/1
CAPSULE, EXTENDED RELEASE ORAL
Qty: 90 CAPSULE | Refills: 0 | Status: SHIPPED | OUTPATIENT
Start: 2021-12-27 | End: 2022-04-07

## 2021-12-27 NOTE — TELEPHONE ENCOUNTER
Rx Refill Note  Requested Prescriptions     Pending Prescriptions Disp Refills   • venlafaxine XR (EFFEXOR-XR) 75 MG 24 hr capsule [Pharmacy Med Name: Venlafaxine HCl ER 75 MG Oral Capsule Extended Release 24 Hour] 90 capsule 0     Sig: Take 1 capsule by mouth once daily      Last office visit with prescribing clinician: 9/14/2021      Next office visit with prescribing clinician: 12/27/2021  Last fill: 09/20/21, #90    Destiney Mims LPN  12/27/21, 09:38 EST

## 2021-12-27 NOTE — TELEPHONE ENCOUNTER
Rx Refill Note  Requested Prescriptions     Pending Prescriptions Disp Refills   • clonazePAM (KlonoPIN) 2 MG tablet [Pharmacy Med Name: clonazePAM 2 MG Oral Tablet] 90 tablet 0     Sig: TAKE 1 TABLET BY MOUTH THREE TIMES DAILY AS NEEDED FOR ANXIETY FOR  UP  TO  30  DAYS      Last office visit with prescribing clinician: 9/14/2021      Next office visit with prescribing clinician: none  Last fill: 09/21/21, #90, 2 refills  Tox: 01/27/20  Bobby: 01/16/21    Destiney Mims LPN  12/27/21, 15:59 EST

## 2022-01-12 RX ORDER — POTASSIUM CHLORIDE 1500 MG/1
TABLET, FILM COATED, EXTENDED RELEASE ORAL
Qty: 120 TABLET | Refills: 0 | Status: SHIPPED | OUTPATIENT
Start: 2022-01-12 | End: 2022-04-26

## 2022-01-12 NOTE — TELEPHONE ENCOUNTER
Rx Refill Note  Requested Prescriptions     Pending Prescriptions Disp Refills   • potassium chloride ER (K-TAB) 20 MEQ tablet controlled-release ER tablet [Pharmacy Med Name: Potassium Chloride ER 20 MEQ Oral Tablet Extended Release] 120 tablet 0     Sig: Take 2 tablets by mouth twice daily      Last office visit with prescribing clinician: 9/14/2021      Next office visit with prescribing clinician: Visit date not found   Lab: Comprehensive Metabolic Panel (09/14/2021 17:28)    Destiney Mims LPN  01/12/22, 12:07 EST

## 2022-01-22 ENCOUNTER — HOSPITAL ENCOUNTER (INPATIENT)
Dept: HOSPITAL 49 - FER | Age: 72
LOS: 5 days | Discharge: HOME | DRG: 871 | End: 2022-01-27
Attending: INTERNAL MEDICINE | Admitting: INTERNAL MEDICINE
Payer: MEDICARE

## 2022-01-22 VITALS — BODY MASS INDEX: 39.2 KG/M2 | WEIGHT: 235.31 LBS | HEIGHT: 65 IN

## 2022-01-22 DIAGNOSIS — Z79.899: ICD-10-CM

## 2022-01-22 DIAGNOSIS — I48.20: ICD-10-CM

## 2022-01-22 DIAGNOSIS — J96.01: ICD-10-CM

## 2022-01-22 DIAGNOSIS — Z66: ICD-10-CM

## 2022-01-22 DIAGNOSIS — Z82.49: ICD-10-CM

## 2022-01-22 DIAGNOSIS — Z79.01: ICD-10-CM

## 2022-01-22 DIAGNOSIS — J44.0: ICD-10-CM

## 2022-01-22 DIAGNOSIS — J12.82: ICD-10-CM

## 2022-01-22 DIAGNOSIS — Z86.718: ICD-10-CM

## 2022-01-22 DIAGNOSIS — E11.65: ICD-10-CM

## 2022-01-22 DIAGNOSIS — Z90.49: ICD-10-CM

## 2022-01-22 DIAGNOSIS — I25.10: ICD-10-CM

## 2022-01-22 DIAGNOSIS — F41.9: ICD-10-CM

## 2022-01-22 DIAGNOSIS — R82.71: ICD-10-CM

## 2022-01-22 DIAGNOSIS — Z88.5: ICD-10-CM

## 2022-01-22 DIAGNOSIS — K59.01: ICD-10-CM

## 2022-01-22 DIAGNOSIS — Z90.710: ICD-10-CM

## 2022-01-22 DIAGNOSIS — A41.89: Primary | ICD-10-CM

## 2022-01-22 DIAGNOSIS — F32.A: ICD-10-CM

## 2022-01-22 DIAGNOSIS — I50.33: ICD-10-CM

## 2022-01-22 DIAGNOSIS — J44.1: ICD-10-CM

## 2022-01-22 DIAGNOSIS — E03.9: ICD-10-CM

## 2022-01-22 DIAGNOSIS — E87.4: ICD-10-CM

## 2022-01-22 DIAGNOSIS — Z98.890: ICD-10-CM

## 2022-01-22 DIAGNOSIS — Z88.1: ICD-10-CM

## 2022-01-22 DIAGNOSIS — R65.20: ICD-10-CM

## 2022-01-22 DIAGNOSIS — I11.0: ICD-10-CM

## 2022-01-22 DIAGNOSIS — U07.1: ICD-10-CM

## 2022-01-22 DIAGNOSIS — Z87.891: ICD-10-CM

## 2022-01-22 LAB
ALBUMIN SERPL-MCNC: 2.8 G/DL (ref 3.4–5)
ALKALINE PHOSHATASE: 84 U/L (ref 46–116)
ALT SERPL-CCNC: 12 U/L (ref 14–59)
AST: 24 U/L (ref 15–37)
BACTERIA: (no result)
BASOPHIL: 0.4 % (ref 0–2)
BILIRUBIN - TOTAL: 0.4 MG/DL (ref 0.2–1)
BILIRUBIN: NEGATIVE MG/DL
BLOOD: (no result) ERY/UL
BUN SERPL-MCNC: 16 MG/DL (ref 7–18)
BUN/CREAT RATIO (CALC): 21.1 RATIO
CHLORIDE: 96 MMOL/L (ref 98–107)
CLARITY UR: CLEAR
CO2 (BICARBONATE): 36 MMOL/L (ref 21–32)
COLOR: YELLOW
CREATININE: 0.76 MG/DL (ref 0.51–0.95)
D DIMER PPP FEU-MCNC: 1.35 UG/MLFEU (ref 0–0.41)
EOSINOPHIL: 0.1 % (ref 0–7)
GLOBULIN (CALCULATION): 5.3 G/DL
GLUCOSE (U): NORMAL MG/DL
GLUCOSE SERPL-MCNC: 219 MG/DL (ref 74–106)
HCT: 44.8 % (ref 37–47)
HGB BLD-MCNC: 13.6 G/DL (ref 12.5–16)
INR PPP: 1.35 (ref 0.9–1.2)
LACTIC ACID: 1.6 MMOL/L (ref 0.4–1.9)
LEUKOCYTES: NEGATIVE LEU/UL
LYMPHOCYTE: 9.9 % (ref 15–48)
MCH RBC QN AUTO: 29.7 PG (ref 25–31)
MCHC RBC AUTO-ENTMCNC: 30.4 G/DL (ref 32–36)
MCV: 97.8 FL (ref 78–100)
MONOCYTE: 9.8 % (ref 0–12)
MPV: 9 FL (ref 6–9.5)
NEUTROPHIL: 79.3 % (ref 41–80)
NITRITE: NEGATIVE MG/DL
NRBC: 0
PLT: 193 K/UL (ref 150–400)
POTASSIUM: 4.3 MMOL/L (ref 3.5–5.1)
PROTEIN: (no result) MG/DL
PROTHROMBIN TIME: 16 SECONDS (ref 11.8–13.4)
PTT: 42.5 SECONDS (ref 24.4–34.7)
RBC MORPHOLOGY: NORMAL
RBC: 4.58 M/UL (ref 4.2–5.4)
RDW: 13.7 % (ref 11.5–14)
SPECIFIC GRAVITY: 1.01 (ref 1–1.03)
SQUAMOUS EPITHELIAL CELLS: (no result)
TOTAL PROTEIN: 8.1 G/DL (ref 6.4–8.2)
URINARY WBC: (no result)
UROBILINOGEN: 0.2 MG/DL (ref 0.2–1)
WBC: 7.3 K/UL (ref 4–10.5)

## 2022-01-22 PROCEDURE — U0002 COVID-19 LAB TEST NON-CDC: HCPCS

## 2022-01-22 PROCEDURE — C9399 UNCLASSIFIED DRUGS OR BIOLOG: HCPCS

## 2022-01-22 PROCEDURE — XW033E5 INTRODUCTION OF REMDESIVIR ANTI-INFECTIVE INTO PERIPHERAL VEIN, PERCUTANEOUS APPROACH, NEW TECHNOLOGY GROUP 5: ICD-10-PCS

## 2022-01-23 PROCEDURE — 8E0ZXY6 ISOLATION: ICD-10-PCS

## 2022-01-24 LAB
BASOPHIL: 0.2 % (ref 0–2)
BUN SERPL-MCNC: 21 MG/DL (ref 7–18)
BUN/CREAT RATIO (CALC): 29.6 RATIO
CHLORIDE: 97 MMOL/L (ref 98–107)
CO2 (BICARBONATE): 39 MMOL/L (ref 21–32)
CREATININE: 0.71 MG/DL (ref 0.51–0.95)
EOSINOPHIL: 0.2 % (ref 0–7)
GLUCOSE SERPL-MCNC: 136 MG/DL (ref 74–106)
HCT: 43.9 % (ref 37–47)
HGB BLD-MCNC: 13.5 G/DL (ref 12.5–16)
LYMPHOCYTE: 18 % (ref 15–48)
MAGNESIUM SERPL-MCNC: 2.5 MG/DL (ref 1.8–2.4)
MCH RBC QN AUTO: 29.9 PG (ref 25–31)
MCHC RBC AUTO-ENTMCNC: 30.8 G/DL (ref 32–36)
MCV: 97.3 FL (ref 78–100)
MONOCYTE: 11.6 % (ref 0–12)
MPV: 8.8 FL (ref 6–9.5)
NEUTROPHIL: 69.4 % (ref 41–80)
NRBC: 0
PLT: 216 K/UL (ref 150–400)
POTASSIUM: 3.8 MMOL/L (ref 3.5–5.1)
RBC MORPHOLOGY: NORMAL
RBC: 4.51 M/UL (ref 4.2–5.4)
RDW: 13.6 % (ref 11.5–14)
WBC: 5.3 K/UL (ref 4–10.5)

## 2022-01-25 LAB
BUN SERPL-MCNC: 23 MG/DL (ref 7–18)
BUN/CREAT RATIO (CALC): 33.8 RATIO
CHLORIDE: 94 MMOL/L (ref 98–107)
CO2 (BICARBONATE): 39 MMOL/L (ref 21–32)
CREATININE: 0.68 MG/DL (ref 0.51–0.95)
GLUCOSE SERPL-MCNC: 195 MG/DL (ref 74–106)
HCT: 47.4 % (ref 37–47)
HGB BLD-MCNC: 14.7 G/DL (ref 12.5–16)
MCH RBC QN AUTO: 29.6 PG (ref 25–31)
MCHC RBC AUTO-ENTMCNC: 31 G/DL (ref 32–36)
MCV: 95.4 FL (ref 78–100)
MPV: 8.7 FL (ref 6–9.5)
PLT: 268 K/UL (ref 150–400)
POTASSIUM: 3.9 MMOL/L (ref 3.5–5.1)
RBC: 4.97 M/UL (ref 4.2–5.4)
RDW: 13.4 % (ref 11.5–14)
WBC: 5.5 K/UL (ref 4–10.5)

## 2022-01-26 NOTE — NUR
1/26/22 Ms. Cruz lives at home with her spous. She reports to have been
independent in the home and community prior to admission. A referral was made
to [place] [address] at [facility]. - Ms. Cruz does not wish to have .
English

## 2022-01-27 LAB
BASOPHIL: 0.2 % (ref 0–2)
BUN SERPL-MCNC: 30 MG/DL (ref 7–18)
BUN/CREAT RATIO (CALC): 38.5 RATIO
C-REACTIVE PROTEIN: 3.4 MG/DL (ref ?–0.9)
CHLORIDE: 94 MMOL/L (ref 98–107)
CO2 (BICARBONATE): 40 MMOL/L (ref 21–32)
CREATININE: 0.78 MG/DL (ref 0.51–0.95)
EOSINOPHIL: 0.1 % (ref 0–7)
GLUCOSE SERPL-MCNC: 152 MG/DL (ref 74–106)
HCT: 47.5 % (ref 37–47)
HGB BLD-MCNC: 15 G/DL (ref 12.5–16)
LYMPHOCYTE: 14.4 % (ref 15–48)
MCH RBC QN AUTO: 29.7 PG (ref 25–31)
MCHC RBC AUTO-ENTMCNC: 31.6 G/DL (ref 32–36)
MCV: 94.1 FL (ref 78–100)
MONOCYTE: 8.5 % (ref 0–12)
MPV: 9 FL (ref 6–9.5)
NEUTROPHIL: 76.1 % (ref 41–80)
NRBC: 0
PLT: 294 K/UL (ref 150–400)
POTASSIUM: 4.3 MMOL/L (ref 3.5–5.1)
RBC MORPHOLOGY: NORMAL
RBC: 5.05 M/UL (ref 4.2–5.4)
RDW: 13.2 % (ref 11.5–14)
WBC: 8.1 K/UL (ref 4–10.5)

## 2022-01-30 ENCOUNTER — TELEPHONE (OUTPATIENT)
Dept: FAMILY MEDICINE CLINIC | Age: 72
End: 2022-01-30

## 2022-01-30 DIAGNOSIS — F41.1 GENERALIZED ANXIETY DISORDER: ICD-10-CM

## 2022-01-31 ENCOUNTER — TELEPHONE (OUTPATIENT)
Dept: FAMILY MEDICINE CLINIC | Age: 72
End: 2022-01-31

## 2022-01-31 RX ORDER — SULFAMETHOXAZOLE AND TRIMETHOPRIM 800; 160 MG/1; MG/1
1 TABLET ORAL 2 TIMES DAILY
Qty: 14 TABLET | Refills: 0 | Status: SHIPPED | OUTPATIENT
Start: 2022-01-31 | End: 2022-08-11

## 2022-01-31 NOTE — TELEPHONE ENCOUNTER
"Pt said she was admitted to [facility] 01/22/22 and d/c 01/26/22 with covid. She was sent home with oxygen at 2 lpm and a steriod, doesn't remenber the name of it.  She said they stopped her bumex. She said she had a castro catheter while in the hospital. She is now having urinary burning, frequency and small amounts. Still weak and not feeling good. She is asking for Bactrium DS.  Tried to set up a hospital f/u and she said her  is still in the hospital and there is no way she could come in.  \" Please send in a antibiotic\" per pt.   "

## 2022-02-01 RX ORDER — CLONAZEPAM 2 MG/1
TABLET ORAL
Qty: 90 TABLET | Refills: 0 | Status: SHIPPED | OUTPATIENT
Start: 2022-02-01 | End: 2022-03-08 | Stop reason: SDUPTHER

## 2022-02-01 NOTE — TELEPHONE ENCOUNTER
Rx Refill Note  Requested Prescriptions     Pending Prescriptions Disp Refills   • clonazePAM (KlonoPIN) 2 MG tablet [Pharmacy Med Name: clonazePAM 2 MG Oral Tablet] 90 tablet 0     Sig: TAKE 1 TABLET BY MOUTH THREE TIMES DAILY AS NEEDED FOR ANXIETY FOR UP TO 30 DAYS      Last office visit with prescribing clinician: 9/14/2021      Next office visit with prescribing clinician: 02/14/2022  Last fill: 12/27/21, #90  Tox: 01/27/2020    Destiney Mims LPN  02/01/22, 11:10 EST

## 2022-02-03 NOTE — TELEPHONE ENCOUNTER
Pt called and said she used to get refills on her clonazepam, she hasn't got them lately and has to call every month. Asking if refills could be put on it next time?

## 2022-03-03 DIAGNOSIS — F41.1 GENERALIZED ANXIETY DISORDER: ICD-10-CM

## 2022-03-03 RX ORDER — CLONAZEPAM 2 MG/1
TABLET ORAL
Qty: 90 TABLET | Refills: 0 | OUTPATIENT
Start: 2022-03-03

## 2022-03-07 DIAGNOSIS — F41.1 GENERALIZED ANXIETY DISORDER: ICD-10-CM

## 2022-03-07 RX ORDER — CLONAZEPAM 2 MG/1
TABLET ORAL
Qty: 90 TABLET | Refills: 0 | OUTPATIENT
Start: 2022-03-07

## 2022-03-07 RX ORDER — CLONAZEPAM 2 MG/1
2 TABLET ORAL 3 TIMES DAILY PRN
Qty: 90 TABLET | Refills: 0 | Status: CANCELLED | OUTPATIENT
Start: 2022-03-07

## 2022-03-07 NOTE — TELEPHONE ENCOUNTER
Rx Refill Note  Requested Prescriptions     Pending Prescriptions Disp Refills   • clonazePAM (KlonoPIN) 2 MG tablet 90 tablet 0     Sig: Take 1 tablet by mouth 3 (Three) Times a Day As Needed for Anxiety.      Last office visit with prescribing clinician: 9/14/2021      Next office visit with prescribing clinician: Visit date not found  Last fill: 02/01/2022, #90  Tox: 01/28/2020  Pt said she is completely out of Medication.     Destiney Mims LPN  03/07/22, 10:21 EST

## 2022-03-07 NOTE — TELEPHONE ENCOUNTER
[facility] WAS UNABLE TO WARM TRANSFER     Caller: Annie Friend    Relationship: Self    Best call back number: [phone]      What is the best time to reach you: ANYTIME     Who are you requesting to speak with (clinical staff, provider,  specific staff member): CLINICAL         What was the call regarding: PATIENT IS CALLING CHECKING THE ORDER STATUS OF MEDICATION.     Do you require a callback: YES

## 2022-03-08 RX ORDER — CLONAZEPAM 2 MG/1
2 TABLET ORAL 3 TIMES DAILY PRN
Qty: 90 TABLET | Refills: 0 | Status: SHIPPED | OUTPATIENT
Start: 2022-03-08 | End: 2022-03-15 | Stop reason: SDUPTHER

## 2022-03-15 DIAGNOSIS — F41.1 GENERALIZED ANXIETY DISORDER: ICD-10-CM

## 2022-03-15 RX ORDER — DIGOXIN 125 MCG
TABLET ORAL
Qty: 90 TABLET | Refills: 0 | Status: SHIPPED | OUTPATIENT
Start: 2022-03-15 | End: 2022-07-05

## 2022-03-15 RX ORDER — CLONAZEPAM 2 MG/1
2 TABLET ORAL 3 TIMES DAILY PRN
Qty: 90 TABLET | Refills: 0 | Status: SHIPPED | OUTPATIENT
Start: 2022-03-15 | End: 2022-04-07

## 2022-03-15 NOTE — TELEPHONE ENCOUNTER
Rx Refill Note  Requested Prescriptions     Pending Prescriptions Disp Refills   • digoxin (LANOXIN) 125 MCG tablet [Pharmacy Med Name: Digoxin 125 MCG Oral Tablet] 90 tablet 0     Sig: Take 1 tablet by mouth once daily      Last office visit with prescribing clinician: 9/14/2021      Next office visit with prescribing clinician: 5/5/2022   Janessa Alejandra LPN  03/15/22, 16:03 EDT     LF-11/15/21 #90    LAST LABS 1/27/22

## 2022-04-06 ENCOUNTER — TELEPHONE (OUTPATIENT)
Dept: FAMILY MEDICINE CLINIC | Age: 72
End: 2022-04-06

## 2022-04-06 NOTE — TELEPHONE ENCOUNTER
[facility] staff attempted to follow warm transfer process and was unsuccessful     Caller: Annie Friend    Relationship to patient: Self    Best call back number: [phone]    Patient is needing: PATIENT IS NEEDING TO SPEAK WITH CLINICAL STAFF REGARDING HER HEALTH AS SOON AS POSSIBLE. NO ANSWER FROM OFFICE WHEN [facility] ATTEMPTED TO WARM TRANSFER.

## 2022-04-07 DIAGNOSIS — F41.1 GENERALIZED ANXIETY DISORDER: ICD-10-CM

## 2022-04-07 RX ORDER — CLONAZEPAM 2 MG/1
2 TABLET ORAL 3 TIMES DAILY PRN
Qty: 45 TABLET | Refills: 0 | Status: SHIPPED | OUTPATIENT
Start: 2022-04-07 | End: 2022-04-25 | Stop reason: SDUPTHER

## 2022-04-07 RX ORDER — VENLAFAXINE HYDROCHLORIDE 75 MG/1
CAPSULE, EXTENDED RELEASE ORAL
Qty: 90 CAPSULE | Refills: 0 | Status: SHIPPED | OUTPATIENT
Start: 2022-04-07 | End: 2022-07-25

## 2022-04-07 NOTE — TELEPHONE ENCOUNTER
After reviewing Bobby, I have sent a partial refill on this.  The patient is outside of the 6-month window.  Technically, she is past due for follow-up.  I will defer to Dr. Hickey whether he would want to refill the remainder of this months prescription prior to seeing her.  Thanks.

## 2022-04-07 NOTE — TELEPHONE ENCOUNTER
Left message with great grand daughter to have pt call back. She said she was unavailable at this time.

## 2022-04-07 NOTE — TELEPHONE ENCOUNTER
Rx Refill Note  Requested Prescriptions     Pending Prescriptions Disp Refills   • clonazePAM (KlonoPIN) 2 MG tablet [Pharmacy Med Name: clonazePAM 2 MG Oral Tablet] 90 tablet 0     Sig: TAKE 1 TABLET BY MOUTH THREE TIMES DAILY AS NEEDED FOR ANXIETY      Last office visit with prescribing clinician: 9/14/2021      Next office visit with prescribing clinician: 5/5/2022   {TIP  Encounters: Progress Notes by Aroldo Hickey MD (09/14/2021 16:15)      {TIP  Please add Last Relevant Lab Date if appropriate:  [facility] 1/27/20   {TIP  Is Refill Pharmacy correct?: yes   Arlene Rahman  04/07/22, 10:27 EDT

## 2022-04-25 DIAGNOSIS — F41.1 GENERALIZED ANXIETY DISORDER: ICD-10-CM

## 2022-04-25 RX ORDER — CLONAZEPAM 2 MG/1
2 TABLET ORAL 3 TIMES DAILY PRN
Qty: 90 TABLET | Refills: 0 | Status: SHIPPED | OUTPATIENT
Start: 2022-04-25 | End: 2022-05-24

## 2022-04-25 NOTE — TELEPHONE ENCOUNTER
Rx Refill Note  Requested Prescriptions     Pending Prescriptions Disp Refills   • clonazePAM (KlonoPIN) 2 MG tablet 45 tablet 0     Sig: Take 1 tablet by mouth 3 (Three) Times a Day As Needed for Anxiety. for anxiety      Last office visit with prescribing clinician: 9/14/2021      Next office visit with prescribing clinician: 5/5/2022  Last fill: 04/07/22, #45 by on call Dr Gorman.     Destiney Mims LPN  04/25/22, 16:16 EDT

## 2022-04-26 RX ORDER — POTASSIUM CHLORIDE 1500 MG/1
TABLET, FILM COATED, EXTENDED RELEASE ORAL
Qty: 120 TABLET | Refills: 0 | Status: SHIPPED | OUTPATIENT
Start: 2022-04-26 | End: 2022-07-05

## 2022-04-29 ENCOUNTER — TELEPHONE (OUTPATIENT)
Dept: FAMILY MEDICINE CLINIC | Age: 72
End: 2022-04-29

## 2022-04-29 NOTE — TELEPHONE ENCOUNTER
Pt informed that Dr Hickey is not in the office. I could send it to the on call doctor if she would like. They will either say she needs an appt to be seen or may order a u/a.   She said no don't send it to on call.  She will increase her fluids. She has an appt with Dr Hickey next week.  Advised ER if worsen.

## 2022-04-29 NOTE — TELEPHONE ENCOUNTER
Caller: Annie Friend    Relationship: Self    Best call back number: [phone]    What medication are you requesting: ANTIBIOTIC     What are your current symptoms: PAINFUL AND FREQUENT URINATION    How long have you been experiencing symptoms: 3 DAYS    Have you had these symptoms before:    [x] Yes  [] No    Have you been treated for these symptoms before:   [x] Yes  [] No    If a prescription is needed, what is your preferred pharmacy and phone number: [facility] [address] [place] [address] RHONDA KELLY [facility] [phone] [facility] [phone] FX     Additional notes:    PATIENT STATES SHE HAS UTI SYMPTOMS  AND SHE HAS HAD THEM BEFORE SO IS CONFIDENT THAT'S THE ISSUE. PLEASE CALL PATIENT WHEN PRESCRIPTION HAS BEEN SENT IN.

## 2022-05-05 ENCOUNTER — TELEPHONE (OUTPATIENT)
Dept: FAMILY MEDICINE CLINIC | Age: 72
End: 2022-05-05

## 2022-05-05 RX ORDER — LEVALBUTEROL TARTRATE 45 UG/1
2 AEROSOL, METERED ORAL EVERY 6 HOURS PRN
COMMUNITY
Start: 2022-01-29 | End: 2022-11-15

## 2022-05-05 RX ORDER — DEXAMETHASONE 2 MG/1
2 TABLET ORAL 3 TIMES DAILY
COMMUNITY
Start: 2022-01-27 | End: 2022-08-11

## 2022-05-05 RX ORDER — BUMETANIDE 2 MG/1
4 TABLET ORAL DAILY
COMMUNITY
Start: 2021-05-28

## 2022-05-05 NOTE — TELEPHONE ENCOUNTER
Caller: Annie Friend    Relationship to patient: Self    Best call back number: [phone]    Type of visit: OFFICE VISIT    Requested date: BEFORE JULY    If rescheduling, when is the original appointment: TODAY AT 2:30PM    Additional notes: PATIENT IS HAVING DIARRHEA AND UNABLE TO COME IN FOR THE APPOINTMENT AT 2:30 TODAY. SHE WOULD LIKE TO KNOW IF SHE CAN BE FIT ON THE SCHEDULE. DR WALDROP IS BOOKED UP FOR ABOUT A MONTH OR TWO. ATTEMPTED TO WARM TRANSFER. PLEASE CALL PATIENT TO SCHEDULE IF POSSIBLE.

## 2022-05-06 NOTE — TELEPHONE ENCOUNTER
She can see whichever nurse practitioner has the first opening since I believe this is for this is for an acute issue. She also however needs a regular follow-up of her chronic health problems with me which could wait until July.    Message text       Dr Hickey said

## 2022-05-06 NOTE — TELEPHONE ENCOUNTER
Pt said her urinary symptoms have gone away now. She just wanted to schedule her routine f/u with Dr Hickey. Appt made for 07/07/22 at 1:00 pm.

## 2022-05-24 ENCOUNTER — TELEPHONE (OUTPATIENT)
Dept: FAMILY MEDICINE CLINIC | Age: 72
End: 2022-05-24

## 2022-05-24 DIAGNOSIS — R60.0 EDEMA OF BOTH LOWER LEGS: ICD-10-CM

## 2022-05-24 DIAGNOSIS — F41.1 GENERALIZED ANXIETY DISORDER: ICD-10-CM

## 2022-05-24 DIAGNOSIS — I50.30 DIASTOLIC HEART FAILURE, UNSPECIFIED HF CHRONICITY: Primary | ICD-10-CM

## 2022-05-24 RX ORDER — CLONAZEPAM 2 MG/1
TABLET ORAL
Qty: 90 TABLET | Refills: 0 | Status: SHIPPED | OUTPATIENT
Start: 2022-05-24 | End: 2022-06-25

## 2022-05-24 NOTE — TELEPHONE ENCOUNTER
Pt said she has been taking Bumex 2mg two daily. She said she takes both of them at the same time because if not it keeps her up at night. She said she has swelling in her feet, ankles and lower legs.  After she is in bed all night she still gets up with some swelling remaining.   Advised pt to elevate her legs during the day when she is up in the chair, lay down in the bed in the middle of the day and get her feet up higher than her heart, avoid salty foods.  I ask her if she see's a cardiologist and she said [facility], but she hasnt seen him for a long time.  Dr Hickey please advise.

## 2022-05-24 NOTE — TELEPHONE ENCOUNTER
Rx Refill Note  Requested Prescriptions     Pending Prescriptions Disp Refills   • clonazePAM (KlonoPIN) 2 MG tablet [Pharmacy Med Name: clonazePAM 2 MG Oral Tablet] 90 tablet 0     Sig: TAKE 1 TABLET BY MOUTH THREE TIMES DAILY AS NEEDED FOR ANXIETY      Last office visit with prescribing clinician: 9/14/2021      Next office visit with prescribing clinician: 7/7/2022   Janessa Alejandra LPN  05/24/22, 11:53 EDT     LF-4/25/22 #90, RF-0    PT HAS NO SHOW'D HER LAST 3 APPTS

## 2022-05-25 NOTE — TELEPHONE ENCOUNTER
AGREE WITH THE ADVICE THAT SHE WAS GIVEN.  SHE NEEDS TO TAKE THE MEDICATION AS DIRECTED AND TO F/U WITH HER CARDIOLOGIST SINCE SHE HAS HEART FAILURE.  GO TO THE ER IF WORSE IN THE MEANTIME.

## 2022-05-26 NOTE — TELEPHONE ENCOUNTER
Pt informed. Cardiology referral placed for [facility] since its been awhile since she has been there.

## 2022-06-21 ENCOUNTER — HOSPITAL ENCOUNTER
Dept: HOSPITAL 49 - FER | Age: 72
LOS: 2 days | Discharge: HOME | End: 2022-06-23
Attending: INTERNAL MEDICINE | Admitting: INTERNAL MEDICINE
Payer: MEDICARE

## 2022-06-21 VITALS — BODY MASS INDEX: 36.91 KG/M2 | WEIGHT: 232.44 LBS | HEIGHT: 66.5 IN

## 2022-06-21 DIAGNOSIS — R53.1: ICD-10-CM

## 2022-06-21 DIAGNOSIS — Z86.718: ICD-10-CM

## 2022-06-21 DIAGNOSIS — Z79.899: ICD-10-CM

## 2022-06-21 DIAGNOSIS — J96.01: ICD-10-CM

## 2022-06-21 DIAGNOSIS — Z88.0: ICD-10-CM

## 2022-06-21 DIAGNOSIS — Z79.84: ICD-10-CM

## 2022-06-21 DIAGNOSIS — E11.9: ICD-10-CM

## 2022-06-21 DIAGNOSIS — I11.0: ICD-10-CM

## 2022-06-21 DIAGNOSIS — Z87.891: ICD-10-CM

## 2022-06-21 DIAGNOSIS — Z79.01: ICD-10-CM

## 2022-06-21 DIAGNOSIS — J44.1: ICD-10-CM

## 2022-06-21 DIAGNOSIS — E03.9: ICD-10-CM

## 2022-06-21 DIAGNOSIS — I50.30: ICD-10-CM

## 2022-06-21 DIAGNOSIS — Z88.5: ICD-10-CM

## 2022-06-21 DIAGNOSIS — J18.9: Primary | ICD-10-CM

## 2022-06-21 DIAGNOSIS — Z20.822: ICD-10-CM

## 2022-06-21 DIAGNOSIS — I48.20: ICD-10-CM

## 2022-06-21 LAB
ALBUMIN SERPL-MCNC: 2.9 G/DL (ref 3.4–5)
ALKALINE PHOSHATASE: 86 U/L (ref 46–116)
ALT SERPL-CCNC: 9 U/L (ref 14–59)
AST: 9 U/L (ref 15–37)
BASOPHIL: 0.8 % (ref 0–2)
BILIRUBIN - TOTAL: 0.4 MG/DL (ref 0.2–1)
BILIRUBIN: NEGATIVE MG/DL
BLOOD: NEGATIVE ERY/UL
BUN SERPL-MCNC: 17 MG/DL (ref 7–18)
BUN/CREAT RATIO (CALC): 21 RATIO
CHLORIDE: 99 MMOL/L (ref 98–107)
CLARITY UR: CLEAR
CO2 (BICARBONATE): 30 MMOL/L (ref 21–32)
COLOR: YELLOW
CORONAVIRUS 2019 SARS-COV-2: NEGATIVE
CREATININE: 0.81 MG/DL (ref 0.51–0.95)
EOSINOPHIL: 1.4 % (ref 0–7)
GLOBULIN (CALCULATION): 4.5 G/DL
GLUCOSE (U): NORMAL MG/DL
GLUCOSE SERPL-MCNC: 193 MG/DL (ref 74–106)
HCT: 43.1 % (ref 37–47)
HGB BLD-MCNC: 13.3 G/DL (ref 12.5–16)
INFLUENZA A NAA: NEGATIVE
INR PPP: 1.46 (ref 0.9–1.2)
IRON % SATURATION: 9.7 %SAT (ref 20–50)
IRON SERPL-MCNC: 26 UG/DL (ref 50–170)
LEUKOCYTES: NEGATIVE LEU/UL
LYMPHOCYTE: 17.4 % (ref 15–48)
MCH RBC QN AUTO: 30.2 PG (ref 25–31)
MCHC RBC AUTO-ENTMCNC: 30.9 G/DL (ref 32–36)
MCV: 98 FL (ref 78–100)
MONOCYTE: 9.4 % (ref 0–12)
MPV: 8.9 FL (ref 6–9.5)
NEUTROPHIL: 70.5 % (ref 41–80)
NITRITE: NEGATIVE MG/DL
NRBC: 0
PLT: 237 K/UL (ref 150–400)
POTASSIUM: 4.4 MMOL/L (ref 3.5–5.1)
PROTEIN: NEGATIVE MG/DL
PROTHROMBIN TIME: 17 SECONDS (ref 11.8–13.4)
PTT: 39.4 SECONDS (ref 24.4–34.7)
RBC MORPHOLOGY: NORMAL
RBC: 4.4 M/UL (ref 4.2–5.4)
RDW: 12.3 % (ref 11.5–14)
SPECIFIC GRAVITY: 1.02 (ref 1–1.03)
TOTAL PROTEIN: 7.4 G/DL (ref 6.4–8.2)
UROBILINOGEN: 0.2 MG/DL (ref 0.2–1)
WBC: 12.6 K/UL (ref 4–10.5)

## 2022-06-21 PROCEDURE — G0378 HOSPITAL OBSERVATION PER HR: HCPCS

## 2022-06-21 PROCEDURE — U0002 COVID-19 LAB TEST NON-CDC: HCPCS

## 2022-06-22 LAB
BASOPHIL: 0.8 % (ref 0–2)
BUN SERPL-MCNC: 15 MG/DL (ref 7–18)
BUN/CREAT RATIO (CALC): 21.1 RATIO
CHLORIDE: 101 MMOL/L (ref 98–107)
CO2 (BICARBONATE): 34 MMOL/L (ref 21–32)
CREATININE: 0.71 MG/DL (ref 0.51–0.95)
EOSINOPHIL: 2 % (ref 0–7)
GLUCOSE SERPL-MCNC: 165 MG/DL (ref 74–106)
HCT: 43.7 % (ref 37–47)
HGB BLD-MCNC: 12.9 G/DL (ref 12.5–16)
LYMPHOCYTE: 17 % (ref 15–48)
MAGNESIUM SERPL-MCNC: 2.2 MG/DL (ref 1.8–2.4)
MCH RBC QN AUTO: 29.3 PG (ref 25–31)
MCHC RBC AUTO-ENTMCNC: 29.5 G/DL (ref 32–36)
MCV: 99.3 FL (ref 78–100)
MONOCYTE: 9.7 % (ref 0–12)
MPV: 9.2 FL (ref 6–9.5)
NEUTROPHIL: 70.2 % (ref 41–80)
NRBC: 0
PLT: 238 K/UL (ref 150–400)
POTASSIUM: 4.3 MMOL/L (ref 3.5–5.1)
RBC MORPHOLOGY: NORMAL
RBC: 4.4 M/UL (ref 4.2–5.4)
RDW: 12.4 % (ref 11.5–14)
WBC: 10 K/UL (ref 4–10.5)

## 2022-06-22 NOTE — NUR
PATIENT COMPLAINING OF THE FEELING TO URINATE BUT CANNOT. BLADDER SCAN SHOWED
646ML RETAINED. MD NOTIFIED.

## 2022-06-23 ENCOUNTER — TELEPHONE (OUTPATIENT)
Dept: FAMILY MEDICINE CLINIC | Age: 72
End: 2022-06-23

## 2022-06-23 ENCOUNTER — READMISSION MANAGEMENT (OUTPATIENT)
Dept: CALL CENTER | Facility: HOSPITAL | Age: 72
End: 2022-06-23

## 2022-06-23 NOTE — OUTREACH NOTE
Prep Survey    Flowsheet Row Responses   [facility] facility patient discharged from? Non-BH   Is LACE score < 7 ? Non-BH Discharge   Emergency Room discharge w/ pulse ox? No   Eligibility TCM   Hospital [facility]   Date of Discharge 06/23/22   Discharge diagnosis unavailable   Does the patient have one of the following disease processes/diagnoses(primary or secondary)? Other   Prep survey completed? Yes          NYSAIA PHILLIPS - Registered Nurse

## 2022-06-24 ENCOUNTER — TRANSITIONAL CARE MANAGEMENT TELEPHONE ENCOUNTER (OUTPATIENT)
Dept: CALL CENTER | Facility: HOSPITAL | Age: 72
End: 2022-06-24

## 2022-06-24 DIAGNOSIS — F41.1 GENERALIZED ANXIETY DISORDER: ICD-10-CM

## 2022-06-24 NOTE — NUR
6/24 Galina recommended outpatient services and a rw. Ms. Cruz was
discharged when an attempt was made to discuss these recommendations. Numerous
unsuccessful attempts were made to reach her by phone. Voicemail was not an
option.

## 2022-06-24 NOTE — OUTREACH NOTE
Call Center TCM Note    Flowsheet Row Responses   [facility] patient discharged from? Non-   Does the patient have one of the following disease processes/diagnoses(primary or secondary)? Other   TCM attempt successful? Yes   Call start time 1247   Call end time 1251   Discharge diagnosis unavailable   Does the patient have all medications ordered at discharge? N/A   Is the patient taking all medications as directed (includes completed medication regime)? Yes   Does the patient have a primary care provider?  Yes   Does the patient have an appointment with their PCP within 7 days of discharge? Greater than 7 days   Comments regarding PCP Pt has an appt on 7/7/22 and 7/8/22 both 15 mins. The 7/8/22 appt does not meet TCM guidelines.    What is preventing the patient from scheduling follow up appointments within 7 days of discharge? Earlier appointment not available   Nursing Interventions Verified appointment date/time/provider  [Route to PCP office]   Has home health visited the patient within 72 hours of discharge? N/A   Psychosocial issues? No   Did the patient receive a copy of their discharge instructions? Yes   What is the patient's perception of their health status since discharge? Improving   Is the patient/caregiver able to teach back signs and symptoms related to disease process for when to call PCP? Yes   Is the patient/caregiver able to teach back signs and symptoms related to disease process for when to call 911? Yes   Is the patient/caregiver able to teach back the hierarchy of who to call/visit for symptoms/problems? PCP, Specialist, Home health nurse, Urgent Care, ED, 911 Yes   TCM call completed? Yes   Wrap up additional comments Pt reports she is doing better. Was seen at [facility] for weakness and SOA.           Belkys Kelly RN    6/24/2022, 12:53 EDT

## 2022-06-24 NOTE — TELEPHONE ENCOUNTER
Rx Refill Note  Requested Prescriptions     Pending Prescriptions Disp Refills   • clonazePAM (KlonoPIN) 2 MG tablet [Pharmacy Med Name: clonazePAM 2 MG Oral Tablet] 90 tablet 0     Sig: TAKE 1 TABLET BY MOUTH THREE TIMES DAILY AS NEEDED FOR ANXIETY      Last office visit with prescribing clinician: 9/14/2021      Next office visit with prescribing clinician: 7/7/2022   Janessa Alejandra LPN  06/24/22, 08:54 EDT       -5/24/22 #90, RF-0  
cigarettes

## 2022-06-24 NOTE — OUTREACH NOTE
Call Center TCM Note    Flowsheet Row Responses   [facility] patient discharged from? Non-BH   Does the patient have one of the following disease processes/diagnoses(primary or secondary)? Other   TCM attempt successful? No  [No verbal release]   Unsuccessful attempts Attempt 1   Does the patient have a primary care provider?  Yes          Belkys Kelly RN    6/24/2022, 08:44 EDT

## 2022-06-25 RX ORDER — CLONAZEPAM 2 MG/1
TABLET ORAL
Qty: 45 TABLET | Refills: 0 | Status: SHIPPED | OUTPATIENT
Start: 2022-06-25 | End: 2022-07-15 | Stop reason: SDUPTHER

## 2022-07-04 DIAGNOSIS — E11.9 TYPE 2 DIABETES MELLITUS TREATED WITHOUT INSULIN: ICD-10-CM

## 2022-07-05 RX ORDER — GLIMEPIRIDE 2 MG/1
TABLET ORAL
Qty: 180 TABLET | Refills: 0 | Status: SHIPPED | OUTPATIENT
Start: 2022-07-05 | End: 2022-10-27

## 2022-07-05 RX ORDER — POTASSIUM CHLORIDE 1500 MG/1
TABLET, FILM COATED, EXTENDED RELEASE ORAL
Qty: 120 TABLET | Refills: 0 | Status: SHIPPED | OUTPATIENT
Start: 2022-07-05 | End: 2022-08-16 | Stop reason: SDUPTHER

## 2022-07-05 RX ORDER — DIGOXIN 125 MCG
TABLET ORAL
Qty: 90 TABLET | Refills: 0 | Status: SHIPPED | OUTPATIENT
Start: 2022-07-05 | End: 2022-10-17

## 2022-07-11 RX ORDER — DIGOXIN 125 MCG
TABLET ORAL
Qty: 90 TABLET | Refills: 0 | OUTPATIENT
Start: 2022-07-11

## 2022-07-14 ENCOUNTER — TELEPHONE (OUTPATIENT)
Dept: FAMILY MEDICINE CLINIC | Age: 72
End: 2022-07-14

## 2022-07-14 NOTE — TELEPHONE ENCOUNTER
Caller: Annie Friend    Relationship: Self    Best call back number: [phone]    Who are you requesting to speak with (clinical staff, provider,  specific staff member): GHAZAL    What was the call regarding: THE PATIENT IS RETURNING A MISSED CALL FROM GHAZAL AND WOULD LIKE A CALL BACK ASAP    Do you require a callback: YES

## 2022-07-14 NOTE — TELEPHONE ENCOUNTER
Hospital f/u changed to telehealth due to provider working from home.  Pt said she doesn't have a cell phone only a land line.  Appt will need to be changed to in office.  Next available appt is 08/01/22, which is too far out.  Pt was f/u from pneumonia.   Fwd to Dr Hickey to advise.

## 2022-07-15 DIAGNOSIS — F41.1 GENERALIZED ANXIETY DISORDER: ICD-10-CM

## 2022-07-15 RX ORDER — CLONAZEPAM 2 MG/1
2 TABLET ORAL 3 TIMES DAILY PRN
Qty: 45 TABLET | Refills: 0 | Status: SHIPPED | OUTPATIENT
Start: 2022-07-15 | End: 2022-08-11 | Stop reason: SDUPTHER

## 2022-07-15 NOTE — TELEPHONE ENCOUNTER
Rx Refill Note  Requested Prescriptions     Pending Prescriptions Disp Refills   • clonazePAM (KlonoPIN) 2 MG tablet 45 tablet 0     Sig: Take 1 tablet by mouth 3 (Three) Times a Day As Needed. for anxiety      Last office visit with prescribing clinician: 9/14/2021      Next office visit with prescribing clinician: 7/18/2022  Last refill sent: 06/25/22, #45  Tox: 01/28/2020    Destiney Mims LPN  07/15/22, 15:31 EDT

## 2022-07-20 ENCOUNTER — TELEPHONE (OUTPATIENT)
Dept: FAMILY MEDICINE CLINIC | Age: 72
End: 2022-07-20

## 2022-07-20 NOTE — TELEPHONE ENCOUNTER
Pt called to post pone her hospital f/u again. She said she still has watery diarrhea. She can get out with it like that. She has Imodium otc to take. She wants to know if Dr Hickey can call her something stronger in to the pharmacy?  Rescheduled appt for 07/28/22.

## 2022-07-20 NOTE — TELEPHONE ENCOUNTER
CAN'T TREAT HER WITHOUT SEEING HER.  SHE COULD ALSO SEE ONE OF THE ACUTE CARE PROVIDERS FOR THE DIARRHEA

## 2022-07-25 ENCOUNTER — TELEPHONE (OUTPATIENT)
Dept: FAMILY MEDICINE CLINIC | Age: 72
End: 2022-07-25

## 2022-07-25 RX ORDER — VENLAFAXINE HYDROCHLORIDE 75 MG/1
CAPSULE, EXTENDED RELEASE ORAL
Qty: 90 CAPSULE | Refills: 0 | Status: SHIPPED | OUTPATIENT
Start: 2022-07-25 | End: 2022-08-11 | Stop reason: SDUPTHER

## 2022-07-25 NOTE — TELEPHONE ENCOUNTER
Rx Refill Note  Requested Prescriptions     Pending Prescriptions Disp Refills   • venlafaxine XR (EFFEXOR-XR) 75 MG 24 hr capsule [Pharmacy Med Name: Venlafaxine HCl ER 75 MG Oral Capsule Extended Release 24 Hour] 90 capsule 0     Sig: Take 1 capsule by mouth once daily      Last office visit with prescribing clinician: 9/14/2021      Next office visit with prescribing clinician: 8/11/2022    Destiney Mims LPN  07/25/22, 16:52 EDT

## 2022-07-25 NOTE — TELEPHONE ENCOUNTER
Pt said her  passed away 07/20/22. She cancelled her upcoming appt and rescheduled it further out. She will be traveling to [place] for his services.

## 2022-08-08 ENCOUNTER — TELEPHONE (OUTPATIENT)
Dept: FAMILY MEDICINE CLINIC | Age: 72
End: 2022-08-08

## 2022-08-08 NOTE — TELEPHONE ENCOUNTER
Pt stated her  passed away 2wks ago and lost her insurance through the  so it will cost her 3900 a month for he eliquis

## 2022-08-08 NOTE — TELEPHONE ENCOUNTER
Caller: Annie Friend    Relationship: Self    Best call back number: [phone]     What medication are you requesting: WARFARIN    What are your current symptoms:     How long have you been experiencing symptoms:     Have you had these symptoms before:    [] Yes  [] No    Have you been treated for these symptoms before:   [] Yes  [] No    If a prescription is needed, what is your preferred pharmacy and phone number: [facility] [address] [place] [address] [phone] RHONDA KELLY [facility] [phone] [facility] [phone] FX     Additional notes: PATIENT CANNOT AFFORD THE  apixaban (ELIQUIS) 5 MG tablet tablet ANYMORE.    PATIENT HAS NO BLOOD THINNER FOR TODAY    PATIENT WOULD LIKE FOR GHAZAL TO CALL HER

## 2022-08-09 NOTE — TELEPHONE ENCOUNTER
Pt inf stated she will need to schedule an appt with them but has been with out anything for 4 days, I called [facility] spoke w/ Velasquez she will send him msg and see what they can do   [phone]

## 2022-08-09 NOTE — TELEPHONE ENCOUNTER
She needs to discuss alternatives with her cardiologist. If she has not seen them for a while then she needs a follow up appointment with them.

## 2022-08-11 ENCOUNTER — OFFICE VISIT (OUTPATIENT)
Dept: FAMILY MEDICINE CLINIC | Age: 72
End: 2022-08-11

## 2022-08-11 ENCOUNTER — LAB (OUTPATIENT)
Dept: LAB | Facility: HOSPITAL | Age: 72
End: 2022-08-11

## 2022-08-11 ENCOUNTER — TRANSCRIBE ORDERS (OUTPATIENT)
Dept: ADMINISTRATIVE | Facility: HOSPITAL | Age: 72
End: 2022-08-11

## 2022-08-11 VITALS
HEIGHT: 66 IN | HEART RATE: 78 BPM | SYSTOLIC BLOOD PRESSURE: 112 MMHG | BODY MASS INDEX: 35.55 KG/M2 | OXYGEN SATURATION: 93 % | WEIGHT: 221.2 LBS | DIASTOLIC BLOOD PRESSURE: 72 MMHG

## 2022-08-11 DIAGNOSIS — E11.9 TYPE 2 DIABETES MELLITUS TREATED WITHOUT INSULIN: Primary | ICD-10-CM

## 2022-08-11 DIAGNOSIS — Z12.31 ENCOUNTER FOR SCREENING MAMMOGRAM FOR MALIGNANT NEOPLASM OF BREAST: Primary | ICD-10-CM

## 2022-08-11 DIAGNOSIS — F34.1 DYSTHYMIC DISORDER: ICD-10-CM

## 2022-08-11 DIAGNOSIS — Z79.899 HIGH RISK MEDICATION USE: ICD-10-CM

## 2022-08-11 DIAGNOSIS — I48.20 CHRONIC ATRIAL FIBRILLATION: ICD-10-CM

## 2022-08-11 DIAGNOSIS — Z12.31 ENCOUNTER FOR SCREENING MAMMOGRAM FOR BREAST CANCER: ICD-10-CM

## 2022-08-11 DIAGNOSIS — M17.0 PRIMARY OSTEOARTHRITIS OF KNEES, BILATERAL: ICD-10-CM

## 2022-08-11 DIAGNOSIS — I10 ESSENTIAL HYPERTENSION: ICD-10-CM

## 2022-08-11 PROBLEM — Z53.20 COLONOSCOPY REFUSED: Status: ACTIVE | Noted: 2022-08-11

## 2022-08-11 LAB
ALBUMIN SERPL-MCNC: 3.9 G/DL (ref 3.5–5.2)
ALBUMIN/GLOB SERPL: 1.1 G/DL
ALP SERPL-CCNC: 81 U/L (ref 39–117)
ALT SERPL W P-5'-P-CCNC: 12 U/L (ref 1–33)
AMPHET+METHAMPHET UR QL: NEGATIVE
AMPHETAMINES UR QL: NEGATIVE
ANION GAP SERPL CALCULATED.3IONS-SCNC: 11.4 MMOL/L (ref 5–15)
AST SERPL-CCNC: 18 U/L (ref 1–32)
BARBITURATES UR QL SCN: NEGATIVE
BENZODIAZ UR QL SCN: NEGATIVE
BILIRUB SERPL-MCNC: 0.5 MG/DL (ref 0–1.2)
BUN SERPL-MCNC: 20 MG/DL (ref 8–23)
BUN/CREAT SERPL: 18.5 (ref 7–25)
BUPRENORPHINE SERPL-MCNC: NEGATIVE NG/ML
CALCIUM SPEC-SCNC: 9.3 MG/DL (ref 8.6–10.5)
CANNABINOIDS SERPL QL: NEGATIVE
CHLORIDE SERPL-SCNC: 95 MMOL/L (ref 98–107)
CHOLEST SERPL-MCNC: 150 MG/DL (ref 0–200)
CO2 SERPL-SCNC: 31.6 MMOL/L (ref 22–29)
COCAINE UR QL: NEGATIVE
CREAT SERPL-MCNC: 1.08 MG/DL (ref 0.57–1)
DEPRECATED RDW RBC AUTO: 47.3 FL (ref 37–54)
EGFRCR SERPLBLD CKD-EPI 2021: 54 ML/MIN/1.73
ERYTHROCYTE [DISTWIDTH] IN BLOOD BY AUTOMATED COUNT: 13.3 % (ref 12.3–15.4)
EXPIRATION DATE: NORMAL
GLOBULIN UR ELPH-MCNC: 3.6 GM/DL
GLUCOSE SERPL-MCNC: 166 MG/DL (ref 65–99)
HBA1C MFR BLD: 8.5 % (ref 4.8–5.6)
HCT VFR BLD AUTO: 48.8 % (ref 34–46.6)
HDLC SERPL-MCNC: 42 MG/DL (ref 40–60)
HGB BLD-MCNC: 14.9 G/DL (ref 12–15.9)
LDLC SERPL CALC-MCNC: 84 MG/DL (ref 0–100)
LDLC/HDLC SERPL: 1.91 {RATIO}
Lab: NORMAL
MCH RBC QN AUTO: 28.8 PG (ref 26.6–33)
MCHC RBC AUTO-ENTMCNC: 30.5 G/DL (ref 31.5–35.7)
MCV RBC AUTO: 94.4 FL (ref 79–97)
MDMA UR QL SCN: NEGATIVE
METHADONE UR QL SCN: NEGATIVE
OPIATES UR QL: NEGATIVE
OXYCODONE UR QL SCN: NEGATIVE
PCP UR QL SCN: NEGATIVE
PLATELET # BLD AUTO: 281 10*3/MM3 (ref 140–450)
PMV BLD AUTO: 8.9 FL (ref 6–12)
POTASSIUM SERPL-SCNC: 4.4 MMOL/L (ref 3.5–5.2)
PROT SERPL-MCNC: 7.5 G/DL (ref 6–8.5)
RBC # BLD AUTO: 5.17 10*6/MM3 (ref 3.77–5.28)
SODIUM SERPL-SCNC: 138 MMOL/L (ref 136–145)
TRIGL SERPL-MCNC: 138 MG/DL (ref 0–150)
TSH SERPL DL<=0.05 MIU/L-ACNC: 2.3 UIU/ML (ref 0.27–4.2)
VLDLC SERPL-MCNC: 24 MG/DL (ref 5–40)
WBC NRBC COR # BLD: 12.82 10*3/MM3 (ref 3.4–10.8)

## 2022-08-11 PROCEDURE — 83036 HEMOGLOBIN GLYCOSYLATED A1C: CPT | Performed by: FAMILY MEDICINE

## 2022-08-11 PROCEDURE — 84443 ASSAY THYROID STIM HORMONE: CPT | Performed by: FAMILY MEDICINE

## 2022-08-11 PROCEDURE — 99214 OFFICE O/P EST MOD 30 MIN: CPT | Performed by: FAMILY MEDICINE

## 2022-08-11 PROCEDURE — 80053 COMPREHEN METABOLIC PANEL: CPT | Performed by: FAMILY MEDICINE

## 2022-08-11 PROCEDURE — 36415 COLL VENOUS BLD VENIPUNCTURE: CPT | Performed by: FAMILY MEDICINE

## 2022-08-11 PROCEDURE — 80061 LIPID PANEL: CPT | Performed by: FAMILY MEDICINE

## 2022-08-11 PROCEDURE — 80305 DRUG TEST PRSMV DIR OPT OBS: CPT | Performed by: FAMILY MEDICINE

## 2022-08-11 PROCEDURE — 85027 COMPLETE CBC AUTOMATED: CPT | Performed by: FAMILY MEDICINE

## 2022-08-11 RX ORDER — WARFARIN SODIUM 2 MG/1
1 TABLET ORAL
COMMUNITY
Start: 2022-08-09 | End: 2022-10-04

## 2022-08-11 RX ORDER — CLONAZEPAM 2 MG/1
2 TABLET ORAL 3 TIMES DAILY PRN
Qty: 90 TABLET | Refills: 2
Start: 2022-08-11 | End: 2022-11-15 | Stop reason: SDUPTHER

## 2022-08-11 RX ORDER — WARFARIN SODIUM 3 MG/1
1.5 TABLET ORAL
Qty: 15 TABLET | Refills: 0 | Status: SHIPPED | OUTPATIENT
Start: 2022-08-11 | End: 2022-10-04

## 2022-08-11 RX ORDER — VENLAFAXINE HYDROCHLORIDE 150 MG/1
150 CAPSULE, EXTENDED RELEASE ORAL DAILY
Qty: 90 CAPSULE | Refills: 1 | Status: SHIPPED | OUTPATIENT
Start: 2022-08-11 | End: 2022-12-27 | Stop reason: SDUPTHER

## 2022-08-11 RX ORDER — CLONAZEPAM 2 MG/1
2 TABLET ORAL 3 TIMES DAILY PRN
Qty: 90 TABLET | Refills: 2 | Status: SHIPPED | OUTPATIENT
Start: 2022-08-11 | End: 2022-08-11

## 2022-08-11 NOTE — ASSESSMENT & PLAN NOTE
Patient's depression is single episode and is moderate without psychosis. Their depression is currently active and the condition is worsening. This will be reassessed in 3 months. F/U as described:patient will continue current medication therapy   WILL INCREASE THE EFFEXOR PER HER REQUEST.  STABLE ON CURRENT MEDICATION.  TERA REVIEWED.  TOX SCREEN IS UP TO DATE.  THE CONTINUED USE OF A CONTROLLED SUBSTANCE IS NECESSARY FOR THIS PATIENT TO HAVE A NEAR NORMAL QUALITY OF LIFE AND WILL BE REVIEWED AT THE NEXT ROUTINE VISIT..

## 2022-08-11 NOTE — PROGRESS NOTES
Chief Complaint  Diabetes    Subjective     {Problem List  Visit Diagnosis   Encounters  Notes  Medications  Labs  Result Review Imaging  Media :23}     Annie Friend presents to [facility]  --TOLERATING BLOOD PRESSURE MEDICATION WITHOUT APPARENT SIDE EFFECTS  --HAS CHRONIC ATRIAL FIBRILLATION, CAN NO LONGER AFFORD ELIQUIS BUT WILL NOT SEE HER CARDIOLOGIST AGAIN FOR A FEW WEEKS.  WAS ON COUMADIN BEFORE  --THE OZ IN HER KNEES IS GETTING WORSE ANS SHE WOULD LIKE TO SEE ORTHO AGAIN  --HGA1C WAS > 10 % SEVERAL MONTHS AGO.  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  CONTINUES ON ROUTINE KLONOPIN AND WOULD LIKE TO INCREASE THE EFFEXOR  --DUE FOR SOME ROUTINE LABS AND A MAMMOGRAM        Allergies   Allergen Reactions   • Codeine Rash   • Penicillins Rash        Health Maintenance Due   Topic Date Due   • MAMMOGRAM  Never done   • URINE MICROALBUMIN  Never done   • DXA SCAN  Never done   • COLORECTAL CANCER SCREENING  Never done   • ANNUAL WELLNESS VISIT  08/20/2019   • DIABETIC FOOT EXAM  Never done   • DIABETIC EYE EXAM  Never done   • HEMOGLOBIN A1C  03/14/2022        Current Outpatient Medications on File Prior to Visit   Medication Sig   • bumetanide (BUMEX) 2 MG tablet Take 4 mg by mouth Daily.   • clotrimazole-betamethasone (Lotrisone) 1-0.05 % cream Apply 1 application topically to the appropriate area as directed 2 (Two) Times a Day. Apply to affected area bid   • digoxin (LANOXIN) 125 MCG tablet Take 1 tablet by mouth once daily   • docusate sodium 100 MG capsule Take 100 mg by mouth 2 (two) times a day.   • glimepiride (AMARYL) 2 MG tablet Take 1 tablet by mouth twice daily   • levalbuterol (XOPENEX HFA) 45 MCG/ACT inhaler Inhale 2 puffs Every 6 (Six) Hours As Needed.   • metoprolol succinate XL (TOPROL-XL) 50 MG 24 hr tablet  Take 50 mg by mouth 2 (two) times a day.   • potassium chloride ER (K-TAB) 20 MEQ tablet controlled-release ER tablet Take 2 tablets by mouth twice daily   • warfarin (COUMADIN) 2 MG tablet Take 1 tablet by mouth 4 (Four) Times a Week. On Tues, Thurs, Sat and Sun   • [DISCONTINUED] clonazePAM (KlonoPIN) 2 MG tablet Take 1 tablet by mouth 3 (Three) Times a Day As Needed for Anxiety. for anxiety   • [DISCONTINUED] potassium chloride (K-DUR,KLOR-CON) 20 MEQ CR tablet Take 40 mEq by mouth 2 (Two) Times a Day.   • [DISCONTINUED] venlafaxine XR (EFFEXOR-XR) 75 MG 24 hr capsule Take 1 capsule by mouth once daily   • [DISCONTINUED] apixaban (ELIQUIS) 5 MG tablet tablet Take 1 tablet by mouth Every 12 (Twelve) Hours.   • [DISCONTINUED] dexamethasone (DECADRON) 2 MG tablet Take 2 mg by mouth 3 (Three) Times a Day.   • [DISCONTINUED] diclofenac (VOLTAREN) 75 MG EC tablet Take 75 mg by mouth 2 (two) times a day.   • [DISCONTINUED] sulfamethoxazole-trimethoprim (Bactrim DS) 800-160 MG per tablet Take 1 tablet by mouth 2 (Two) Times a Day.     No current facility-administered medications on file prior to visit.       Immunization History   Administered Date(s) Administered   • COVID-19 (PFIZER) PURPLE CAP 03/26/2021, 04/16/2021   • Fluzone High Dose =>65 Years ([employer] ONLY) 10/13/2017   • Influenza, Unspecified 10/10/2019   • PPD Test 05/26/2015   • Pneumococcal Conjugate 13-Valent (PCV13) 06/12/2017       Review of Systems   Constitutional: Positive for fatigue. Negative for activity change, appetite change, chills and fever.   HENT: Negative for congestion, ear pain, rhinorrhea and sore throat.    Respiratory: Negative for cough and shortness of breath.    Cardiovascular: Negative for chest pain, palpitations and leg swelling.   Gastrointestinal: Negative for abdominal pain, constipation, diarrhea, nausea and vomiting.   Musculoskeletal: Negative for arthralgias and myalgias.   Neurological: Negative for headache.     "    Objective     /72 (BP Location: Left arm, Patient Position: Sitting)   Pulse 78   Ht 166.4 cm (65.5\")   Wt 100 kg (221 lb 3.2 oz)   SpO2 93% Comment: on room air  BMI 36.25 kg/m²       Physical Exam  Vitals and nursing note reviewed.   Constitutional:       General: She is not in acute distress.     Appearance: Normal appearance.   Eyes:      Conjunctiva/sclera: Conjunctivae normal.   Cardiovascular:      Rate and Rhythm: Normal rate and regular rhythm.      Heart sounds: Normal heart sounds. No murmur heard.  Pulmonary:      Effort: Pulmonary effort is normal.      Breath sounds: Normal breath sounds.   Abdominal:      Palpations: Abdomen is soft.      Tenderness: There is no abdominal tenderness.   Musculoskeletal:      Cervical back: Neck supple.      Right lower leg: Edema present.      Left lower leg: Edema present.   Lymphadenopathy:      Cervical: No cervical adenopathy.   Neurological:      General: No focal deficit present.      Mental Status: She is alert.      Cranial Nerves: No cranial nerve deficit.      Coordination: Coordination normal.      Gait: Gait normal.   Psychiatric:         Mood and Affect: Mood normal.         Behavior: Behavior normal.         Result Review :                             Assessment and Plan      Diagnoses and all orders for this visit:    1. Type 2 diabetes mellitus treated without insulin (HCC) (Primary)  Assessment & Plan:  Diabetes is improving with treatment.   Continue current treatment regimen.  Diabetes will be reassessed in 3 months.    Orders:  -     Comprehensive Metabolic Panel  -     Hemoglobin A1c  -     Lipid Panel    2. Dysthymic disorder  Assessment & Plan:  Patient's depression is single episode and is moderate without psychosis. Their depression is currently active and the condition is worsening. This will be reassessed in 3 months. F/U as described:patient will continue current medication therapy   WILL INCREASE THE EFFEXOR PER HER " REQUEST.  STABLE ON CURRENT MEDICATION.  TERA REVIEWED.  TOX SCREEN IS UP TO DATE.  THE CONTINUED USE OF A CONTROLLED SUBSTANCE IS NECESSARY FOR THIS PATIENT TO HAVE A NEAR NORMAL QUALITY OF LIFE AND WILL BE REVIEWED AT THE NEXT ROUTINE VISIT..    Orders:  -     venlafaxine XR (EFFEXOR-XR) 150 MG 24 hr capsule; Take 1 capsule by mouth Daily.  Dispense: 90 capsule; Refill: 1  -     clonazePAM (KlonoPIN) 2 MG tablet; Take 1 tablet by mouth 3 (Three) Times a Day As Needed for Anxiety. for anxiety  Dispense: 90 tablet; Refill: 2    3. Essential hypertension  Assessment & Plan:  Hypertension is improving with treatment.  Continue current treatment regimen.  Continue current medications.  Blood pressure will be reassessed in 3 months.    Orders:  -     CBC (No Diff)  -     TSH    4. High risk medication use  -     POC Urine Drug Screen [facility]    5. Encounter for screening mammogram for breast cancer  -     Mammo Screening Bilateral With CAD; Future    6. Primary osteoarthritis of knees, bilateral  -     Ambulatory Referral to Orthopedic Surgery    7. Chronic atrial fibrillation (HCC)  Assessment & Plan:  WILL RESTART COUMADIN SINCE SHE WILL NOT SEE CARDIOLOGY FOR A COUPLE OF WEEKS.      Orders:  -     warfarin (Coumadin) 3 MG tablet; Take 0.5 tablets by mouth Every Other Day.  Dispense: 15 tablet; Refill: 0    Other orders  -     Discontinue: clonazePAM (KlonoPIN) 2 MG tablet; Take 1 tablet by mouth 3 (Three) Times a Day As Needed for Anxiety. for anxiety  Dispense: 90 tablet; Refill: 2          Follow Up     Return in about 3 months (around 11/11/2022).    Patient was given instructions and counseling regarding her condition or for health maintenance advice. Please see specific information pulled into the AVS if appropriate.

## 2022-08-15 NOTE — TELEPHONE ENCOUNTER
Rx Refill Note  Requested Prescriptions     Pending Prescriptions Disp Refills   • potassium chloride ER (K-TAB) 20 MEQ tablet controlled-release ER tablet [Pharmacy Med Name: Potassium Chloride ER 20 MEQ Oral Tablet Extended Release] 120 tablet 0     Sig: Take 2 tablets by mouth twice daily      Last office visit with prescribing clinician: 8/11/2022      Next office visit with prescribing clinician: 11/15/2022     Janessa Alejandra LPN  08/15/22, 12:27 EDT     LF-7/5/22 #120-TAKES 2 CAPS BID      Lab Results   Component Value Date    GLUCOSE 166 (H) 08/11/2022    BUN 20 08/11/2022    CREATININE 1.08 (H) 08/11/2022    EGFRIFNONA >60 10/13/2021    EGFRIFAFRI >60 10/13/2021    BCR 18.5 08/11/2022    K 4.4 08/11/2022    CO2 31.6 (H) 08/11/2022    CALCIUM 9.3 08/11/2022    ALBUMIN 3.90 08/11/2022    LABIL2 0.8 (L) 10/10/2019    AST 18 08/11/2022    ALT 12 08/11/2022

## 2022-08-16 RX ORDER — POTASSIUM CHLORIDE 1500 MG/1
TABLET, FILM COATED, EXTENDED RELEASE ORAL
Qty: 360 TABLET | Refills: 1 | Status: SHIPPED | OUTPATIENT
Start: 2022-08-16 | End: 2023-01-31 | Stop reason: SDUPTHER

## 2022-08-19 ENCOUNTER — TELEPHONE (OUTPATIENT)
Dept: FAMILY MEDICINE CLINIC | Age: 72
End: 2022-08-19

## 2022-08-19 RX ORDER — ONDANSETRON 4 MG/1
4 TABLET, ORALLY DISINTEGRATING ORAL EVERY 8 HOURS PRN
Qty: 12 TABLET | Refills: 1 | Status: SHIPPED | OUTPATIENT
Start: 2022-08-19

## 2022-08-19 NOTE — TELEPHONE ENCOUNTER
Pt said she has had nausea for the past two days. No gastric reflux, or any other symptoms. She said she doesn't eat spicy foods. Requested Zofran.

## 2022-09-13 ENCOUNTER — TELEPHONE (OUTPATIENT)
Dept: FAMILY MEDICINE CLINIC | Age: 72
End: 2022-09-13

## 2022-09-13 NOTE — TELEPHONE ENCOUNTER
Pt said Dr Hickey restarted her on Warfarin 3mg, take 1/2 tablet every other day.  Started on 08/11/22. She doesn't have a cardiology appt until 09/29/22 with Seng Colorado. She is wandering if she needs to get a INR done before that time?

## 2022-09-14 ENCOUNTER — APPOINTMENT (OUTPATIENT)
Dept: MAMMOGRAPHY | Facility: HOSPITAL | Age: 72
End: 2022-09-14

## 2022-10-03 ENCOUNTER — TELEPHONE (OUTPATIENT)
Dept: FAMILY MEDICINE CLINIC | Age: 72
End: 2022-10-03

## 2022-10-03 NOTE — TELEPHONE ENCOUNTER
PLEASE CALL JL BECK'S OFFICE ABOUT THIS MATTER.  HER ATRIAL FIBRILLATION HAS RECURRED AND WE STARTED HER ON WARFARIN UNTIL SHE COULD SEE HIM.  IF HE WANTS HER ON ELIQUIS INSTEAD, WE CAN PRESCRIBE IT BUT WE NEED TO KNOW WHAT DOSE HE WANTS HER ON.

## 2022-10-03 NOTE — TELEPHONE ENCOUNTER
Caller: Annie Friend    Relationship: Self    Best call back number: [phone]    What is the best time to reach you: ANY    Who are you requesting to speak with (clinical staff, provider,  specific staff member): CLINICAL     What was the call regarding: PATIENT STATED THAT SHE WAS INFORMED TO SEE JL BECK AND HE WAS TO MANAGE WARFARIN AND HE INFORMED PATIENT SHE WAS TO BE ON ELIQUIS AND DR WALDROP IS TO MANGE THAT MEDICATION.   [facility] [address] [place], [place] [address] RHONDA KELLY [facility] [phone] [facility] [phone] FX    Do you require a callback: YES

## 2022-10-04 NOTE — TELEPHONE ENCOUNTER
Seng Colorado's office said her INR yesterday was 1.5. He is changing her from warfarin to Eliquis 5mg every 12 hours.  They will fax us his note.

## 2022-10-17 RX ORDER — DIGOXIN 125 MCG
TABLET ORAL
Qty: 90 TABLET | Refills: 0 | Status: SHIPPED | OUTPATIENT
Start: 2022-10-17 | End: 2023-01-31 | Stop reason: SDUPTHER

## 2022-10-17 NOTE — TELEPHONE ENCOUNTER
Rx Refill Note  Requested Prescriptions     Pending Prescriptions Disp Refills   • digoxin (LANOXIN) 125 MCG tablet [Pharmacy Med Name: Digoxin 125 MCG Oral Tablet] 90 tablet 0     Sig: Take 1 tablet by mouth once daily      Last office visit with prescribing clinician: 8/11/2022      Next office visit with prescribing clinician: 11/15/2022  Last refill sent: 07/05/22, #90    Destiney Mims LPN  10/17/22, 12:42 EDT

## 2022-10-27 DIAGNOSIS — E11.9 TYPE 2 DIABETES MELLITUS TREATED WITHOUT INSULIN: ICD-10-CM

## 2022-10-27 RX ORDER — GLIMEPIRIDE 2 MG/1
TABLET ORAL
Qty: 180 TABLET | Refills: 0 | Status: SHIPPED | OUTPATIENT
Start: 2022-10-27 | End: 2023-01-31

## 2022-11-11 ENCOUNTER — TELEPHONE (OUTPATIENT)
Dept: FAMILY MEDICINE CLINIC | Age: 72
End: 2022-11-11

## 2022-11-11 NOTE — TELEPHONE ENCOUNTER
Pt said she has been having a lot of swelling in her feet and ankles. Pitting edema. She said she is taking a fluid pill daily but its not helping.   Advised her to elevate her legs during the day when she is up in the chair. She said she just craves water. Drinks about 4-5, 24 oz bottles of water a day.    Bumex 2mg with the directions of take two daily has not been filled her for a long time.   She will call back to let me know what fluid pill she is currently taking.

## 2022-11-11 NOTE — TELEPHONE ENCOUNTER
Mrs Friend called back and said its Seng Colorado at the cardiology office that prescrbes Bumex 2mg, two a day.   She will call his office.

## 2022-11-15 ENCOUNTER — LAB (OUTPATIENT)
Dept: LAB | Facility: HOSPITAL | Age: 72
End: 2022-11-15

## 2022-11-15 ENCOUNTER — OFFICE VISIT (OUTPATIENT)
Dept: FAMILY MEDICINE CLINIC | Age: 72
End: 2022-11-15

## 2022-11-15 VITALS
WEIGHT: 220 LBS | BODY MASS INDEX: 35.36 KG/M2 | HEIGHT: 66 IN | SYSTOLIC BLOOD PRESSURE: 127 MMHG | HEART RATE: 97 BPM | DIASTOLIC BLOOD PRESSURE: 85 MMHG

## 2022-11-15 DIAGNOSIS — I48.20 CHRONIC ATRIAL FIBRILLATION: ICD-10-CM

## 2022-11-15 DIAGNOSIS — I10 ESSENTIAL HYPERTENSION: ICD-10-CM

## 2022-11-15 DIAGNOSIS — F34.1 DYSTHYMIC DISORDER: ICD-10-CM

## 2022-11-15 DIAGNOSIS — E11.9 TYPE 2 DIABETES MELLITUS TREATED WITHOUT INSULIN: Primary | ICD-10-CM

## 2022-11-15 DIAGNOSIS — Z23 NEED FOR INFLUENZA VACCINATION: ICD-10-CM

## 2022-11-15 LAB — HBA1C MFR BLD: 8 % (ref 4.8–5.6)

## 2022-11-15 PROCEDURE — 83036 HEMOGLOBIN GLYCOSYLATED A1C: CPT | Performed by: FAMILY MEDICINE

## 2022-11-15 PROCEDURE — 80053 COMPREHEN METABOLIC PANEL: CPT | Performed by: FAMILY MEDICINE

## 2022-11-15 PROCEDURE — 80061 LIPID PANEL: CPT | Performed by: FAMILY MEDICINE

## 2022-11-15 PROCEDURE — 99214 OFFICE O/P EST MOD 30 MIN: CPT | Performed by: FAMILY MEDICINE

## 2022-11-15 PROCEDURE — G0008 ADMIN INFLUENZA VIRUS VAC: HCPCS | Performed by: FAMILY MEDICINE

## 2022-11-15 PROCEDURE — 90662 IIV NO PRSV INCREASED AG IM: CPT | Performed by: FAMILY MEDICINE

## 2022-11-15 PROCEDURE — 36415 COLL VENOUS BLD VENIPUNCTURE: CPT | Performed by: FAMILY MEDICINE

## 2022-11-15 RX ORDER — CLONAZEPAM 2 MG/1
2 TABLET ORAL 3 TIMES DAILY PRN
Qty: 90 TABLET | Refills: 2
Start: 2022-11-15 | End: 2022-11-15 | Stop reason: SDUPTHER

## 2022-11-15 RX ORDER — CLONAZEPAM 2 MG/1
2 TABLET ORAL 3 TIMES DAILY PRN
Qty: 90 TABLET | Refills: 2 | Status: SHIPPED | OUTPATIENT
Start: 2022-11-15 | End: 2023-03-07

## 2022-11-15 NOTE — ASSESSMENT & PLAN NOTE
Diabetes is improving with treatment.   Continue current treatment regimen.  Diabetes will be reassessed in 6 months   NOT AT GOAL BUT IMPROVED, WILL RECHECK LABS AND ADJUST MEDS AS INDICATED .

## 2022-11-15 NOTE — PROGRESS NOTES
Chief Complaint  Diabetes (3 months)    Subjective          Annie Friend presents to [facility]  History of Present Illness  --TOLERATING BLOOD PRESSURE MEDICATION WITHOUT APPARENT SIDE EFFECTS  --STILL SIGNIFICANTLY DEPRESSED DESPITE THE INCREASE IN THE EFFEXOR.  CONTINUES ON ROUTINE KLONOPIN.  DENIES SUICIDAL IDEATION.  HAS NOT SEEN A MENTAL HEALTH PROVIDER.  --HGA1C WAS STILL > 8 %, DOES NOT CHECK SUGAR AT HOME  --SHE IS NOW ON ELIQUIS FROM CARDIOLOGY FOR THE ATRIAL FIBRILLATION        Allergies   Allergen Reactions   • Codeine Rash   • Penicillins Rash        Health Maintenance Due   Topic Date Due   • MAMMOGRAM  Never done   • URINE MICROALBUMIN  Never done   • DXA SCAN  Never done   • COLORECTAL CANCER SCREENING  Never done   • ANNUAL WELLNESS VISIT  08/20/2019   • DIABETIC FOOT EXAM  Never done   • DIABETIC EYE EXAM  Never done   • COVID-19 Vaccine (3 - Booster for Pfizer series) 06/11/2021        Current Outpatient Medications on File Prior to Visit   Medication Sig   • apixaban (ELIQUIS) 5 MG tablet tablet Take 5 mg by mouth 2 (Two) Times a Day.   • bumetanide (BUMEX) 2 MG tablet Take 4 mg by mouth Daily.   • clotrimazole-betamethasone (Lotrisone) 1-0.05 % cream Apply 1 application topically to the appropriate area as directed 2 (Two) Times a Day. Apply to affected area bid   • digoxin (LANOXIN) 125 MCG tablet Take 1 tablet by mouth once daily   • docusate sodium 100 MG capsule Take 100 mg by mouth 2 (two) times a day.   • glimepiride (AMARYL) 2 MG tablet Take 1 tablet by mouth twice daily   • metoprolol succinate XL (TOPROL-XL) 50 MG 24 hr tablet Take 50 mg by mouth 2 (two) times a day.   • ondansetron ODT (Zofran ODT) 4 MG disintegrating tablet Place 1 tablet on the tongue Every 8 (Eight) Hours As Needed for Nausea or Vomiting.   • potassium chloride ER (K-TAB) 20 MEQ tablet controlled-release ER tablet Take 2 tablets by mouth twice daily   • venlafaxine XR  "(EFFEXOR-XR) 150 MG 24 hr capsule Take 1 capsule by mouth Daily.   • [DISCONTINUED] clonazePAM (KlonoPIN) 2 MG tablet Take 1 tablet by mouth 3 (Three) Times a Day As Needed for Anxiety. for anxiety   • [DISCONTINUED] levalbuterol (XOPENEX HFA) 45 MCG/ACT inhaler Inhale 2 puffs Every 6 (Six) Hours As Needed.     No current facility-administered medications on file prior to visit.       Immunization History   Administered Date(s) Administered   • COVID-19 (PFIZER) PURPLE CAP 03/26/2021, 04/16/2021   • Fluzone High Dose =>65 Years ([employer] ONLY) 10/13/2017   • Fluzone High-Dose 65+yrs 11/15/2022   • Influenza, Unspecified 10/10/2019   • PPD Test 05/26/2015   • Pneumococcal Conjugate 13-Valent (PCV13) 06/12/2017       Review of Systems   Constitutional: Positive for fatigue. Negative for activity change, appetite change, chills and fever.   HENT: Negative for congestion, ear pain, rhinorrhea and sore throat.    Respiratory: Negative for cough and shortness of breath.    Cardiovascular: Negative for chest pain, palpitations and leg swelling.   Gastrointestinal: Negative for abdominal pain, constipation, diarrhea, nausea and vomiting.   Musculoskeletal: Positive for arthralgias. Negative for myalgias.   Neurological: Negative for headache.        Objective     /85 (BP Location: Left arm, Patient Position: Sitting)   Pulse 97   Ht 166.4 cm (65.5\")   Wt 99.8 kg (220 lb)   BMI 36.05 kg/m²       Physical Exam  Vitals and nursing note reviewed.   Constitutional:       General: She is not in acute distress.     Appearance: Normal appearance.   Cardiovascular:      Rate and Rhythm: Normal rate and regular rhythm.      Heart sounds: Normal heart sounds. No murmur heard.  Pulmonary:      Effort: Pulmonary effort is normal.      Breath sounds: Normal breath sounds.   Abdominal:      Palpations: Abdomen is soft.      Tenderness: There is no abdominal tenderness.   Musculoskeletal:      Cervical back: Neck supple.      " Right lower leg: No edema.      Left lower leg: No edema.   Lymphadenopathy:      Cervical: No cervical adenopathy.   Neurological:      General: No focal deficit present.      Mental Status: She is alert.      Cranial Nerves: No cranial nerve deficit.      Coordination: Coordination normal.      Gait: Gait normal.   Psychiatric:         Mood and Affect: Mood normal.         Behavior: Behavior normal.         Result Review :                             Assessment and Plan      Diagnoses and all orders for this visit:    1. Type 2 diabetes mellitus treated without insulin (HCC) (Primary)  Assessment & Plan:  Diabetes is improving with treatment.   Continue current treatment regimen.  Diabetes will be reassessed in 6 months   NOT AT GOAL BUT IMPROVED, WILL RECHECK LABS AND ADJUST MEDS AS INDICATED .    Orders:  -     Comprehensive Metabolic Panel  -     Hemoglobin A1c  -     Lipid Panel    2. Essential hypertension  Assessment & Plan:  Hypertension is improving with treatment.  Continue current treatment regimen.  Continue current medications.  Blood pressure will be reassessed at the next regular appointment.      3. Dysthymic disorder  Assessment & Plan:  Patient's depression is single episode and is moderate without psychosis. Their depression is currently active and the condition is unchanged. This will be reassessed at the next regular appointment. F/U as described:patient will continue current medication therapy   NO IMPROVEMENT WITH THE INCREASED DOSE OF SSRI.  DENIES SUICIDAL IDEATION.  DECLINES MENTAL HEALTH REFERRAL .  STABLE ON CURRENT MEDICATION.  TERA REVIEWED.  TOX SCREEN IS UP TO DATE.  THE CONTINUED USE OF A CONTROLLED SUBSTANCE IS NECESSARY FOR THIS PATIENT TO HAVE A NEAR NORMAL QUALITY OF LIFE AND WILL BE REVIEWED AT THE NEXT ROUTINE VISIT.    Orders:  -     Discontinue: clonazePAM (KlonoPIN) 2 MG tablet; Take 1 tablet by mouth 3 (Three) Times a Day As Needed for Anxiety. for anxiety  Dispense: 90  tablet; Refill: 2  -     clonazePAM (KlonoPIN) 2 MG tablet; Take 1 tablet by mouth 3 (Three) Times a Day As Needed for Anxiety. for anxiety  Dispense: 90 tablet; Refill: 2    4. Need for influenza vaccination  -     Fluzone High-Dose 65+yrs    5. Chronic atrial fibrillation (HCC)  Assessment & Plan:  PLANS PER CARDIOLOGY (NOTES REVIEWED)             Follow Up     Return in about 6 months (around 5/15/2023).    Patient was given instructions and counseling regarding her condition or for health maintenance advice. Please see specific information pulled into the AVS if appropriate.

## 2022-11-15 NOTE — ASSESSMENT & PLAN NOTE
Patient's depression is single episode and is moderate without psychosis. Their depression is currently active and the condition is unchanged. This will be reassessed at the next regular appointment. F/U as described:patient will continue current medication therapy   NO IMPROVEMENT WITH THE INCREASED DOSE OF SSRI.  DENIES SUICIDAL IDEATION.  DECLINES MENTAL HEALTH REFERRAL .  STABLE ON CURRENT MEDICATION.  TERA REVIEWED.  TOX SCREEN IS UP TO DATE.  THE CONTINUED USE OF A CONTROLLED SUBSTANCE IS NECESSARY FOR THIS PATIENT TO HAVE A NEAR NORMAL QUALITY OF LIFE AND WILL BE REVIEWED AT THE NEXT ROUTINE VISIT.

## 2022-11-16 LAB
ALBUMIN SERPL-MCNC: 3.9 G/DL (ref 3.5–5.2)
ALBUMIN/GLOB SERPL: 1.1 G/DL
ALP SERPL-CCNC: 94 U/L (ref 39–117)
ALT SERPL W P-5'-P-CCNC: 14 U/L (ref 1–33)
ANION GAP SERPL CALCULATED.3IONS-SCNC: 7 MMOL/L (ref 5–15)
AST SERPL-CCNC: 18 U/L (ref 1–32)
BILIRUB SERPL-MCNC: 0.3 MG/DL (ref 0–1.2)
BUN SERPL-MCNC: 24 MG/DL (ref 8–23)
BUN/CREAT SERPL: 26.7 (ref 7–25)
CALCIUM SPEC-SCNC: 9.4 MG/DL (ref 8.6–10.5)
CHLORIDE SERPL-SCNC: 94 MMOL/L (ref 98–107)
CHOLEST SERPL-MCNC: 174 MG/DL (ref 0–200)
CO2 SERPL-SCNC: 37 MMOL/L (ref 22–29)
CREAT SERPL-MCNC: 0.9 MG/DL (ref 0.57–1)
EGFRCR SERPLBLD CKD-EPI 2021: 67.2 ML/MIN/1.73
GLOBULIN UR ELPH-MCNC: 3.4 GM/DL
GLUCOSE SERPL-MCNC: 156 MG/DL (ref 65–99)
HDLC SERPL-MCNC: 43 MG/DL (ref 40–60)
LDLC SERPL CALC-MCNC: 90 MG/DL (ref 0–100)
LDLC/HDLC SERPL: 1.92 {RATIO}
POTASSIUM SERPL-SCNC: 4.6 MMOL/L (ref 3.5–5.2)
PROT SERPL-MCNC: 7.3 G/DL (ref 6–8.5)
SODIUM SERPL-SCNC: 138 MMOL/L (ref 136–145)
TRIGL SERPL-MCNC: 242 MG/DL (ref 0–150)
VLDLC SERPL-MCNC: 41 MG/DL (ref 5–40)

## 2022-12-02 ENCOUNTER — TELEPHONE (OUTPATIENT)
Dept: FAMILY MEDICINE CLINIC | Age: 72
End: 2022-12-02

## 2022-12-02 NOTE — TELEPHONE ENCOUNTER
Spoke with patient, She stated that her legs are swollen and leaking fluid, and painful. She was advised to go to ER, patient agreed.

## 2022-12-09 ENCOUNTER — TELEPHONE (OUTPATIENT)
Dept: FAMILY MEDICINE CLINIC | Age: 72
End: 2022-12-09

## 2022-12-09 NOTE — TELEPHONE ENCOUNTER
Pt called and said she went to [facility] ER on 12/02/22 for cellulitis in both legs. She said they were both wheeping. They gave her a shot of Lasix and Rocephin and kept her for observation in the ER. Sent her home with Rocephin 750mg one tid for 7 days. The left leg is no longer wheeping but the right is still wheeping. It has improved. She has slight swelling, no reddness, no fever or heat.   She still has three pills left to take.  Please advise.

## 2022-12-11 NOTE — TELEPHONE ENCOUNTER
SHE NEEDS TO F/U WITH HER CARDIOLOGIST AS SHE MAY NEED TO BE ON MORE OF THE BUMEX.  PLEASE HELP HER TO GET A F/U ATILIO;RENARD.

## 2022-12-12 NOTE — TELEPHONE ENCOUNTER
Pt informed. She will call Seng Colorado at the cardiology group and see when she can get in.   I ask her to let us know when she gets an appt.

## 2022-12-13 ENCOUNTER — TELEPHONE (OUTPATIENT)
Dept: FAMILY MEDICINE CLINIC | Age: 72
End: 2022-12-13

## 2022-12-13 NOTE — TELEPHONE ENCOUNTER
Pt called back with o2 sat she checked at home: right hand- o2- 92 %, hr- 88. Left hand- o2 sat- 92%, hr- 83.

## 2022-12-13 NOTE — TELEPHONE ENCOUNTER
Called pt to see if she is still using her oxygen and she said yes at night time at 2 lpm.   Pt said she was given it when she was in the hospital with covid and dx with hypoxemia.     She said she has CHF and COPD.   I see the dx of CHF in her chart but I do not see the COPD.   [facility] ER 12/02/22, Spo2- 90%.     Is it okay to fill out the CMN paperwork on her oxygen to continue therapy?

## 2022-12-13 NOTE — TELEPHONE ENCOUNTER
[facility] [employer] faxed a CMN on her oxygen.   Dx; COVID 19, Hypoxemia  Oxygen portability, concentrator platinums.

## 2022-12-22 DIAGNOSIS — F34.1 DYSTHYMIC DISORDER: ICD-10-CM

## 2022-12-27 DIAGNOSIS — F34.1 DYSTHYMIC DISORDER: ICD-10-CM

## 2022-12-27 RX ORDER — VENLAFAXINE HYDROCHLORIDE 150 MG/1
CAPSULE, EXTENDED RELEASE ORAL
Qty: 90 CAPSULE | Refills: 0 | OUTPATIENT
Start: 2022-12-27

## 2022-12-27 RX ORDER — POTASSIUM CHLORIDE 1500 MG/1
TABLET, FILM COATED, EXTENDED RELEASE ORAL
Qty: 360 TABLET | Refills: 0 | OUTPATIENT
Start: 2022-12-27

## 2022-12-27 RX ORDER — VENLAFAXINE HYDROCHLORIDE 150 MG/1
150 CAPSULE, EXTENDED RELEASE ORAL DAILY
Qty: 90 CAPSULE | Refills: 0 | Status: SHIPPED | OUTPATIENT
Start: 2022-12-27 | End: 2023-03-23

## 2022-12-27 NOTE — TELEPHONE ENCOUNTER
Caller: Annie Friend    Relationship: Self    Best call back number: [phone]  Requested Prescriptions:   Requested Prescriptions     Pending Prescriptions Disp Refills   • venlafaxine XR (EFFEXOR-XR) 150 MG 24 hr capsule 90 capsule 1     Sig: Take 1 capsule by mouth Daily.        Pharmacy where request should be sent: [facility] [address] [place] [address] RHONDA KELLY [facility] [phone] [facility] [phone] FX     Additional details provided by patient: PATIENT STATES SHE HAD THIS REFILLED LAST WEEK AND HAS LOST THE BOTTLE. PATIENT STATES SHE TOOK HER LAST ONE TODAY.    Does the patient have less than a 3 day supply:  [x] Yes  [] No    Would you like a call back once the refill request has been completed: [x] Yes [] No    If the office needs to give you a call back, can they leave a voicemail: [x] Yes [] No    Marcello Garcia Rep   12/27/22 10:34 EST

## 2023-01-31 ENCOUNTER — TELEPHONE (OUTPATIENT)
Dept: FAMILY MEDICINE CLINIC | Age: 73
End: 2023-01-31
Payer: MEDICARE

## 2023-01-31 DIAGNOSIS — E11.9 TYPE 2 DIABETES MELLITUS TREATED WITHOUT INSULIN: ICD-10-CM

## 2023-01-31 RX ORDER — DIGOXIN 125 MCG
TABLET ORAL
Qty: 90 TABLET | Refills: 0 | Status: SHIPPED | OUTPATIENT
Start: 2023-01-31

## 2023-01-31 RX ORDER — GLIMEPIRIDE 2 MG/1
TABLET ORAL
Qty: 180 TABLET | Refills: 0 | Status: SHIPPED | OUTPATIENT
Start: 2023-01-31

## 2023-01-31 RX ORDER — POTASSIUM CHLORIDE 1500 MG/1
TABLET, FILM COATED, EXTENDED RELEASE ORAL
Qty: 360 TABLET | Refills: 0 | Status: SHIPPED | OUTPATIENT
Start: 2023-01-31

## 2023-01-31 NOTE — TELEPHONE ENCOUNTER
LAST OV:11/15/22  NEXT OV: 5/17/23  LAST REFILL  DIGOXIN: LF-10/17/22 #90, RF-0  POTASSIUM LF-8/16/22 #360, RF-1  LAST LAB:      COMPREHENSIVE METABOLIC PANEL (12/02/2022 16:08)    DIGOXIN LEVEL (10/09/2021 15:00)      PT HAS APPT WITH DR. BECK 2/3/23

## 2023-01-31 NOTE — TELEPHONE ENCOUNTER
Complained of having chills today, no fever, feels sick to her stomach, belly rumbling.  Just started today.  Offered an appt with a acute care provider, she declined. Said she didn't feel like coming in, would like Dr Hickey to send in something for her nausea.  Told her he is not in the office today and not sure that he will send something in with out her being seen.

## 2023-02-22 NOTE — PROGRESS NOTES
"Chief Complaint  Rash (Legs, back, and chest. Ongoing or 3 days. )    Subjective          Annie Friend presents to [facility]  Rash  This is a new problem. The current episode started in the past 7 days. The affected locations include the torso, left upper leg and right upper leg. The rash is characterized by itchiness and redness. She was exposed to nothing. Pertinent negatives include no facial edema, shortness of breath or vomiting. Treatments tried: zofran, benadryl. The treatment provided mild relief.       Objective   Vital Signs:   /67 (BP Location: Left arm, Patient Position: Sitting, Cuff Size: Adult)   Pulse 71   Temp 98.2 °F (36.8 °C) (Oral)   Ht 166.4 cm (65.51\")   Wt 102 kg (224 lb 3.2 oz)   SpO2 94% Comment: room air  BMI 36.73 kg/m²     Physical Exam  Constitutional:       General: She is not in acute distress.     Appearance: Normal appearance. She is obese.      Comments: In wheelchair   HENT:      Head: Normocephalic.   Eyes:      Pupils: Pupils are equal, round, and reactive to light.      Visual Fields: Right eye visual fields normal and left eye visual fields normal.   Neck:      Trachea: Trachea normal.   Cardiovascular:      Rate and Rhythm: Normal rate and regular rhythm.      Heart sounds: Normal heart sounds.   Pulmonary:      Effort: Pulmonary effort is normal.      Breath sounds: Normal breath sounds and air entry.   Musculoskeletal:      Right lower leg: No edema.      Left lower leg: No edema.   Skin:     General: Skin is warm and dry.      Findings: Rash (Faint diffuse rash on her abdomen and breast.  Underneath her breast it looks consistent with Candida) present.   Neurological:      Mental Status: She is alert and oriented to person, place, and time.   Psychiatric:         Mood and Affect: Mood and affect normal.         Behavior: Behavior normal.         Thought Content: Thought content normal.        Result Review :   The " following data was reviewed by: UDAY Oneil on 02/24/2023:                  Assessment and Plan    Diagnoses and all orders for this visit:    1. Rash (Primary)  -     methylPREDNISolone (MEDROL) 4 MG dose pack; Take as directed on package instructions.  Dispense: 21 each; Refill: 0  -     hydrOXYzine (ATARAX) 25 MG tablet; Take 1 tablet by mouth 3 (Three) Times a Day As Needed for Itching.  Dispense: 30 tablet; Refill: 0  -     ketoconazole (NIZORAL) 2 % cream; Apply 1 application topically to the appropriate area as directed Daily.  Dispense: 30 g; Refill: 0    Treat with a steroid and hydroxyzine.  She is to discontinue taking Benadryl while taking hydroxyzine.  We will treat the rash underneath her breast with ketoconazole.      Follow Up   Return if symptoms worsen or fail to improve.  Patient was given instructions and counseling regarding her condition or for health maintenance advice. Please see specific information pulled into the AVS if appropriate.

## 2023-02-24 ENCOUNTER — OFFICE VISIT (OUTPATIENT)
Dept: FAMILY MEDICINE CLINIC | Age: 73
End: 2023-02-24
Payer: MEDICARE

## 2023-02-24 VITALS
BODY MASS INDEX: 36.03 KG/M2 | WEIGHT: 224.2 LBS | TEMPERATURE: 98.2 F | HEART RATE: 71 BPM | SYSTOLIC BLOOD PRESSURE: 105 MMHG | DIASTOLIC BLOOD PRESSURE: 67 MMHG | HEIGHT: 66 IN | OXYGEN SATURATION: 94 %

## 2023-02-24 DIAGNOSIS — R21 RASH: Primary | ICD-10-CM

## 2023-02-24 PROCEDURE — 99213 OFFICE O/P EST LOW 20 MIN: CPT | Performed by: NURSE PRACTITIONER

## 2023-02-24 RX ORDER — KETOCONAZOLE 20 MG/G
1 CREAM TOPICAL DAILY
Qty: 30 G | Refills: 0 | Status: SHIPPED | OUTPATIENT
Start: 2023-02-24

## 2023-02-24 RX ORDER — METHYLPREDNISOLONE 4 MG/1
TABLET ORAL
Qty: 21 EACH | Refills: 0 | Status: SHIPPED | OUTPATIENT
Start: 2023-02-24

## 2023-02-24 RX ORDER — HYDROXYZINE HYDROCHLORIDE 25 MG/1
25 TABLET, FILM COATED ORAL 3 TIMES DAILY PRN
Qty: 30 TABLET | Refills: 0 | Status: SHIPPED | OUTPATIENT
Start: 2023-02-24

## 2023-02-27 ENCOUNTER — PRIOR AUTHORIZATION (OUTPATIENT)
Dept: FAMILY MEDICINE CLINIC | Age: 73
End: 2023-02-27
Payer: MEDICARE

## 2023-03-04 DIAGNOSIS — F34.1 DYSTHYMIC DISORDER: ICD-10-CM

## 2023-03-06 NOTE — TELEPHONE ENCOUNTER
Rx Refill Note  Requested Prescriptions     Pending Prescriptions Disp Refills   • clonazePAM (KlonoPIN) 2 MG tablet [Pharmacy Med Name: clonazePAM 2 MG Oral Tablet] 90 tablet 0     Sig: TAKE 1 TABLET BY MOUTH THREE TIMES DAILY AS NEEDED FOR ANXIETY      Last office visit with prescribing clinician: 11/15/2022     Next office visit with prescribing clinician: 5/17/2023    LAST FILLED 11/15/22 #90 2 REFILLS     LAST UDS 8/11/22  POC Urine Drug Screen [facility] (08/11/2022 10:24)    LAST CONTRACT 8/12/22

## 2023-03-07 RX ORDER — CLONAZEPAM 2 MG/1
TABLET ORAL
Qty: 15 TABLET | Refills: 0 | Status: SHIPPED | OUTPATIENT
Start: 2023-03-07 | End: 2023-03-13 | Stop reason: SDUPTHER

## 2023-03-07 NOTE — TELEPHONE ENCOUNTER
Caller: Annie Friend    Relationship: Self    Best call back number: [phone]    Requested Prescriptions:   Requested Prescriptions     Pending Prescriptions Disp Refills   • clonazePAM (KlonoPIN) 2 MG tablet [Pharmacy Med Name: clonazePAM 2 MG Oral Tablet] 90 tablet 0     Sig: TAKE 1 TABLET BY MOUTH THREE TIMES DAILY AS NEEDED FOR ANXIETY        Pharmacy where request should be sent: [place] [id number] [address] [facility] [phone] [facility] [phone] FX     Additional details provided by patient: PATIENT IS OUT OF THIS MEDICATION AND TODAY, 03/07/2023, IS THE ONLY DAY SHE HAS SOMEONE TO PICK THIS UP FROM PHARMACY.    Does the patient have less than a 3 day supply:  [x] Yes  [] No    Would you like a call back once the refill request has been completed: [x] Yes [] No    If the office needs to give you a call back, can they leave a voicemail: [x] Yes [] No    Marcello Fulton Rep   03/07/23 15:27 EST

## 2023-03-07 NOTE — TELEPHONE ENCOUNTER
Dr Hickey is out of the office until 03/09/23.  Pt is needing a refill.  Sent to on call provider Dr Thornton.

## 2023-03-10 DIAGNOSIS — F34.1 DYSTHYMIC DISORDER: ICD-10-CM

## 2023-03-10 NOTE — TELEPHONE ENCOUNTER
Caller: Annie Friend    Relationship to patient: Self    Best call back number: [phone]    Patient is needing: PATIENT CALLED IN AND IS REQUESTING A CALL BACK REGARDING REFILL ON CLONAZEPAM. SHE STATES DR. CUELLAR WAS ONLY ABLE TO GIVE HER 15 TABLETS AND SHE USUALLY GETS A 90 QTY. PATIENT REQUESTS A CALL BACK TO SEE IF DR. WALDROP IS IN SO HE CAN COMPLETE THE REFILL

## 2023-03-13 RX ORDER — CLONAZEPAM 2 MG/1
2 TABLET ORAL 3 TIMES DAILY PRN
Qty: 90 TABLET | Refills: 1 | Status: SHIPPED | OUTPATIENT
Start: 2023-03-13

## 2023-03-13 NOTE — TELEPHONE ENCOUNTER
Rx Refill Note  Requested Prescriptions     Pending Prescriptions Disp Refills   • clonazePAM (KlonoPIN) 2 MG tablet 15 tablet 0     Sig: Take 1 tablet by mouth 3 (Three) Times a Day As Needed for Anxiety. for anxiety      Last office visit with prescribing clinician: 11/15/2022   Last telemedicine visit with prescribing clinician: 5/17/2023   Next office visit with prescribing clinician: 5/17/2023  Last filled by on call Dr Thornton for #15, short supply.    Pt picked up on 03/08/23.   Pt requesting Dr Hickey to send in her usual amount.   POC Urine Drug Screen [facility] (08/11/2022 10:24)      eDstiney Mims LPN  03/13/23, 10:05 EDT

## 2023-03-23 DIAGNOSIS — F34.1 DYSTHYMIC DISORDER: ICD-10-CM

## 2023-03-23 RX ORDER — VENLAFAXINE HYDROCHLORIDE 150 MG/1
CAPSULE, EXTENDED RELEASE ORAL
Qty: 90 CAPSULE | Refills: 0 | Status: SHIPPED | OUTPATIENT
Start: 2023-03-23

## 2023-03-23 NOTE — TELEPHONE ENCOUNTER
Rx Refill Note  Requested Prescriptions     Pending Prescriptions Disp Refills   • venlafaxine XR (EFFEXOR-XR) 150 MG 24 hr capsule [Pharmacy Med Name: Venlafaxine HCl  MG Oral Capsule Extended Release 24 Hour] 90 capsule 0     Sig: Take 1 capsule by mouth once daily      Last office visit with prescribing clinician: 11/15/2022     Next office visit with prescribing clinician: 5/17/2023    Crystal Gutierrez LPN  03/23/23, 08:43 EDT

## 2023-04-03 ENCOUNTER — TELEPHONE (OUTPATIENT)
Dept: FAMILY MEDICINE CLINIC | Age: 73
End: 2023-04-03
Payer: MEDICARE

## 2023-04-03 NOTE — TELEPHONE ENCOUNTER
Pt said she has urinary burning and frequency for one day.  She has no transportation to come in.  Asking if you can send in a antibiotic?

## 2023-04-03 NOTE — TELEPHONE ENCOUNTER
Caller: Annie Friedn    Relationship: Self    Best call back number: [phone]    What medication are you requesting: SOMETHING FOR UTI    What are your current symptoms: BURNING/FREQUENT URINATION     How long have you been experiencing symptoms: 1 DAY    Have you had these symptoms before:    [x] Yes  [] No    Have you been treated for these symptoms before:   [x] Yes  [] No    If a prescription is needed, what is your preferred pharmacy and phone number:  [facility] [address] [place] [address] [address] [facility] [phone] [facility] [phone]   [phone]    Additional notes:

## 2023-05-10 DIAGNOSIS — E11.9 TYPE 2 DIABETES MELLITUS TREATED WITHOUT INSULIN: ICD-10-CM

## 2023-05-10 RX ORDER — GLIMEPIRIDE 2 MG/1
TABLET ORAL
Qty: 180 TABLET | Refills: 0 | Status: SHIPPED | OUTPATIENT
Start: 2023-05-10

## 2023-05-16 ENCOUNTER — TELEPHONE (OUTPATIENT)
Dept: FAMILY MEDICINE CLINIC | Age: 73
End: 2023-05-16
Payer: MEDICARE

## 2023-05-16 NOTE — TELEPHONE ENCOUNTER
Caller: Annie Friend    Relationship to patient: Self    Best call back number: [phone]      Type of visit: SUBSEQUENT MEDICARE WELLNESS    Requested date: 05/25/2023 NEEDS TO BE 3:00 OR LATER     If rescheduling, when is the original appointment: 06/28/2023    Additional notes:PATIENT HAD AN APPOINTMENT  SCHEDULED FOR 05/17/2023, BUT SHE DID NOT HAVE TRANSPORTATION AND THE NEXT AVAILABLE APPOINTMENT WAS June 28,  SHE WILL HAVE TRANSPORTATION  ON THE 25TH  PLEASE NOTIFY IF APPOINTMENT CAN BE SCHEDULED

## 2023-05-17 NOTE — TELEPHONE ENCOUNTER
Called pt,  Nothing available on May 25th.  I could probably get her in May 26th at 1:30.  She said she doesn't know if she could get a ride that day.  She will check on it and call back.  Advised her it may not be available long to let us know as soon as possible.

## 2023-05-18 DIAGNOSIS — F34.1 DYSTHYMIC DISORDER: ICD-10-CM

## 2023-05-18 NOTE — TELEPHONE ENCOUNTER
Rx Refill Note  Requested Prescriptions     Pending Prescriptions Disp Refills   • clonazePAM (KlonoPIN) 2 MG tablet [Pharmacy Med Name: clonazePAM 2 MG Oral Tablet] 90 tablet 0     Sig: TAKE 1 TABLET BY MOUTH THREE TIMES DAILY AS NEEDED FOR ANXIETY      Last office visit with prescribing clinician: 11/15/2022   Last telemedicine visit with prescribing clinician: 5/17/2023   Next office visit with prescribing clinician: 5/24/2023  Last refill sent: 03/13/23, #90, 1 refill  POC Urine Drug Screen [facility] (08/11/2022 10:24)      Destiney Mims LPN  05/18/23, 10:04 EDT

## 2023-05-19 RX ORDER — POTASSIUM CHLORIDE 1500 MG/1
40 TABLET, FILM COATED, EXTENDED RELEASE ORAL 2 TIMES DAILY
Qty: 360 TABLET | Refills: 0 | Status: SHIPPED | OUTPATIENT
Start: 2023-05-19

## 2023-05-21 RX ORDER — CLONAZEPAM 2 MG/1
TABLET ORAL
Qty: 90 TABLET | Refills: 0 | Status: SHIPPED | OUTPATIENT
Start: 2023-05-21

## 2023-05-24 ENCOUNTER — LAB (OUTPATIENT)
Dept: LAB | Facility: HOSPITAL | Age: 73
End: 2023-05-24
Payer: MEDICARE

## 2023-05-24 ENCOUNTER — OFFICE VISIT (OUTPATIENT)
Dept: FAMILY MEDICINE CLINIC | Age: 73
End: 2023-05-24
Payer: MEDICARE

## 2023-05-24 VITALS
WEIGHT: 223 LBS | DIASTOLIC BLOOD PRESSURE: 73 MMHG | HEIGHT: 66 IN | SYSTOLIC BLOOD PRESSURE: 121 MMHG | HEART RATE: 81 BPM | BODY MASS INDEX: 35.84 KG/M2

## 2023-05-24 DIAGNOSIS — R30.0 DYSURIA: ICD-10-CM

## 2023-05-24 DIAGNOSIS — Z78.0 POSTMENOPAUSAL STATE: ICD-10-CM

## 2023-05-24 DIAGNOSIS — E11.9 TYPE 2 DIABETES MELLITUS TREATED WITHOUT INSULIN: ICD-10-CM

## 2023-05-24 DIAGNOSIS — Z00.00 MEDICARE ANNUAL WELLNESS VISIT, SUBSEQUENT: Primary | ICD-10-CM

## 2023-05-24 DIAGNOSIS — Z87.891 HISTORY OF NICOTINE DEPENDENCE: ICD-10-CM

## 2023-05-24 DIAGNOSIS — I10 ESSENTIAL HYPERTENSION: Primary | ICD-10-CM

## 2023-05-24 DIAGNOSIS — F34.1 DYSTHYMIC DISORDER: ICD-10-CM

## 2023-05-24 DIAGNOSIS — I10 ESSENTIAL HYPERTENSION: ICD-10-CM

## 2023-05-24 LAB
ALBUMIN SERPL-MCNC: 3.7 G/DL (ref 3.5–5.2)
ALBUMIN/GLOB SERPL: 1 G/DL
ALP SERPL-CCNC: 87 U/L (ref 39–117)
ALT SERPL W P-5'-P-CCNC: 21 U/L (ref 1–33)
ANION GAP SERPL CALCULATED.3IONS-SCNC: 5.1 MMOL/L (ref 5–15)
AST SERPL-CCNC: 23 U/L (ref 1–32)
BILIRUB SERPL-MCNC: 0.4 MG/DL (ref 0–1.2)
BUN SERPL-MCNC: 19 MG/DL (ref 8–23)
BUN/CREAT SERPL: 19.8 (ref 7–25)
CALCIUM SPEC-SCNC: 9.4 MG/DL (ref 8.6–10.5)
CHLORIDE SERPL-SCNC: 101 MMOL/L (ref 98–107)
CHOLEST SERPL-MCNC: 122 MG/DL (ref 0–200)
CO2 SERPL-SCNC: 33.9 MMOL/L (ref 22–29)
CREAT SERPL-MCNC: 0.96 MG/DL (ref 0.57–1)
DEPRECATED RDW RBC AUTO: 49.9 FL (ref 37–54)
EGFRCR SERPLBLD CKD-EPI 2021: 61.8 ML/MIN/1.73
ERYTHROCYTE [DISTWIDTH] IN BLOOD BY AUTOMATED COUNT: 13.5 % (ref 12.3–15.4)
GLOBULIN UR ELPH-MCNC: 3.8 GM/DL
GLUCOSE SERPL-MCNC: 167 MG/DL (ref 65–99)
HBA1C MFR BLD: 7.2 % (ref 4.8–5.6)
HCT VFR BLD AUTO: 43.2 % (ref 34–46.6)
HDLC SERPL-MCNC: 32 MG/DL (ref 40–60)
HGB BLD-MCNC: 13.3 G/DL (ref 12–15.9)
LDLC SERPL CALC-MCNC: 58 MG/DL (ref 0–100)
LDLC/HDLC SERPL: 1.61 {RATIO}
MCH RBC QN AUTO: 30.8 PG (ref 26.6–33)
MCHC RBC AUTO-ENTMCNC: 30.8 G/DL (ref 31.5–35.7)
MCV RBC AUTO: 100 FL (ref 79–97)
PLATELET # BLD AUTO: 236 10*3/MM3 (ref 140–450)
PMV BLD AUTO: 8.3 FL (ref 6–12)
POTASSIUM SERPL-SCNC: 4.9 MMOL/L (ref 3.5–5.2)
PROT SERPL-MCNC: 7.5 G/DL (ref 6–8.5)
RBC # BLD AUTO: 4.32 10*6/MM3 (ref 3.77–5.28)
SODIUM SERPL-SCNC: 140 MMOL/L (ref 136–145)
TRIGL SERPL-MCNC: 192 MG/DL (ref 0–150)
TSH SERPL DL<=0.05 MIU/L-ACNC: 1.34 UIU/ML (ref 0.27–4.2)
VLDLC SERPL-MCNC: 32 MG/DL (ref 5–40)
WBC NRBC COR # BLD: 8.46 10*3/MM3 (ref 3.4–10.8)

## 2023-05-24 PROCEDURE — 85027 COMPLETE CBC AUTOMATED: CPT | Performed by: FAMILY MEDICINE

## 2023-05-24 PROCEDURE — 83036 HEMOGLOBIN GLYCOSYLATED A1C: CPT | Performed by: FAMILY MEDICINE

## 2023-05-24 PROCEDURE — 84443 ASSAY THYROID STIM HORMONE: CPT | Performed by: FAMILY MEDICINE

## 2023-05-24 PROCEDURE — 80061 LIPID PANEL: CPT | Performed by: FAMILY MEDICINE

## 2023-05-24 PROCEDURE — 80053 COMPREHEN METABOLIC PANEL: CPT | Performed by: FAMILY MEDICINE

## 2023-05-24 PROCEDURE — 36415 COLL VENOUS BLD VENIPUNCTURE: CPT | Performed by: FAMILY MEDICINE

## 2023-05-24 RX ORDER — METOPROLOL TARTRATE 75 MG/1
1 TABLET, FILM COATED ORAL EVERY 12 HOURS SCHEDULED
COMMUNITY
Start: 2023-04-27

## 2023-05-24 RX ORDER — SULFAMETHOXAZOLE AND TRIMETHOPRIM 800; 160 MG/1; MG/1
1 TABLET ORAL 2 TIMES DAILY
Qty: 20 TABLET | Refills: 0 | Status: SHIPPED | OUTPATIENT
Start: 2023-05-24

## 2023-05-24 NOTE — ASSESSMENT & PLAN NOTE
Patient's depression is single episode and is moderate without psychosis. Their depression is currently in partial remission and the condition is improving with lifestyle modifications. This will be reassessed at the next regular appointment. F/U as described:patient will continue current medication therapy.  STABLE ON CURRENT MEDICATION.  TERA REVIEWED.  TOX SCREEN IS UP TO DATE.  THE CONTINUED USE OF A CONTROLLED SUBSTANCE IS NECESSARY FOR THIS PATIENT TO HAVE A NEAR NORMAL QUALITY OF LIFE AND WILL BE REVIEWED AT THE NEXT ROUTINE VISIT.

## 2023-05-24 NOTE — ASSESSMENT & PLAN NOTE
Diabetes is improving with treatment.   Continue current treatment regimen.  Reminded to get yearly retinal exam.  Diabetes will be reassessed in 6 months.

## 2023-05-24 NOTE — PROGRESS NOTES
The ABCs of the Annual Wellness Visit  Subsequent Medicare Wellness Visit    Subjective    Annie Friend is a 73 y.o. female who presents for a Subsequent Medicare Wellness Visit.    The following portions of the patient's history were reviewed and   updated as appropriate: allergies, current medications, past family history, past medical history, past social history, past surgical history and problem list.    Compared to one year ago, the patient feels her physical   health is the same.    Compared to one year ago, the patient feels her mental   health is the same.    Recent Hospitalizations:  She was not admitted to the hospital during the last year.       Current Medical Providers:  Patient Care Team:  Aroldo Hickey MD as PCP - General (Family Medicine)    Outpatient Medications Prior to Visit   Medication Sig Dispense Refill   • apixaban (ELIQUIS) 5 MG tablet tablet Take 1 tablet by mouth 2 (Two) Times a Day.     • bumetanide (BUMEX) 2 MG tablet Take 2 tablets by mouth Daily.     • clonazePAM (KlonoPIN) 2 MG tablet TAKE 1 TABLET BY MOUTH THREE TIMES DAILY AS NEEDED FOR ANXIETY 90 tablet 0   • clotrimazole-betamethasone (Lotrisone) 1-0.05 % cream Apply 1 application topically to the appropriate area as directed 2 (Two) Times a Day. Apply to affected area bid 30 g 0   • digoxin (LANOXIN) 125 MCG tablet Take 1 tablet by mouth once daily 90 tablet 0   • docusate sodium 100 MG capsule Take 1 capsule by mouth 2 (two) times a day.     • glimepiride (AMARYL) 2 MG tablet Take 1 tablet by mouth twice daily 180 tablet 0   • ketoconazole (NIZORAL) 2 % cream Apply 1 application topically to the appropriate area as directed Daily. 30 g 0   • ondansetron ODT (Zofran ODT) 4 MG disintegrating tablet Place 1 tablet on the tongue Every 8 (Eight) Hours As Needed for Nausea or Vomiting. 12 tablet 1   • potassium chloride ER (K-TAB) 20 MEQ tablet controlled-release ER tablet Take 2 tablets by mouth 2 (Two) Times a  "Day. 360 tablet 0   • venlafaxine XR (EFFEXOR-XR) 150 MG 24 hr capsule Take 1 capsule by mouth once daily 90 capsule 0   • metoprolol succinate XL (TOPROL-XL) 50 MG 24 hr tablet Take 1.5 tablets by mouth 2 (two) times a day.     • Metoprolol Tartrate 75 MG tablet Take 1 tablet by mouth Every 12 (Twelve) Hours.     • hydrOXYzine (ATARAX) 25 MG tablet Take 1 tablet by mouth 3 (Three) Times a Day As Needed for Itching. 30 tablet 0   • methylPREDNISolone (MEDROL) 4 MG dose pack Take as directed on package instructions. 21 each 0     No facility-administered medications prior to visit.       No opioid medication identified on active medication list. I have reviewed chart for other potential  high risk medication/s and harmful drug interactions in the elderly.          Aspirin is not on active medication list.  Aspirin use is not indicated based on review of current medical condition/s. Risk of harm outweighs potential benefits.  .    Patient Active Problem List   Diagnosis   • Primary osteoarthritis of knees, bilateral   • Chronic atrial fibrillation (HCC)   • Essential hypertension   • Diastolic heart failure   • Type 2 diabetes mellitus treated without insulin   • Dysthymic disorder   • Rectal prolapse   • Colonoscopy declined (Cologuard as well)    • Medicare annual wellness visit, subsequent     Advance Care Planning   Advance Care Planning     Advance Directive is not on file.  ACP discussion was held with the patient during this visit. Patient has an advance directive (not in EMR), copy requested.     Objective    Vitals:    05/24/23 1325   BP: 121/73   BP Location: Left arm   Patient Position: Sitting   Pulse: 81   Weight: 101 kg (223 lb)   Height: 166.4 cm (65.51\")     Estimated body mass index is 36.53 kg/m² as calculated from the following:    Height as of this encounter: 166.4 cm (65.51\").    Weight as of this encounter: 101 kg (223 lb).    Class 2 Severe Obesity (BMI >=35 and <=39.9). Obesity-related health " conditions include the following: diabetes mellitus. Obesity is unchanged. BMI is is above average; BMI management plan is completed. We discussed portion control and increasing exercise.      Does the patient have evidence of cognitive impairment? No          HEALTH RISK ASSESSMENT    Smoking Status:  Social History     Tobacco Use   Smoking Status Former   • Packs/day: 0.50   • Years: 50.00   • Pack years: 25.00   • Types: Cigarettes   • Quit date: 2018   • Years since quittin.0   Smokeless Tobacco Never     Alcohol Consumption:  Social History     Substance and Sexual Activity   Alcohol Use Yes    Comment: 2-3/month     Fall Risk Screen:    NAOMIE Fall Risk Assessment was completed, and patient is at LOW risk for falls.Assessment completed on:2023    Depression Screenin/24/2023     1:28 PM   PHQ-2/PHQ-9 Depression Screening   Little Interest or Pleasure in Doing Things 0-->not at all   Feeling Down, Depressed or Hopeless 0-->not at all   PHQ-9: Brief Depression Severity Measure Score 0       Health Habits and Functional and Cognitive Screenin/24/2023     1:00 PM   Functional & Cognitive Status   Do you have difficulty preparing food and eating? Yes   Do you have difficulty bathing yourself, getting dressed or grooming yourself? No   Do you have difficulty using the toilet? No   Do you have difficulty moving around from place to place? Yes   Do you have trouble with steps or getting out of a bed or a chair? No   Current Diet Limited Junk Food   Dental Exam Not up to date   Eye Exam Not up to date   Exercise (times per week) 0 times per week   Current Exercises Include No Regular Exercise   Do you need help using the phone?  No   Are you deaf or do you have serious difficulty hearing?  No   Do you need help with transportation? Yes   Do you need help shopping? Yes   Do you need help preparing meals?  Yes   Do you need help with housework?  Yes   Do you need help with laundry? Yes    Do you need help taking your medications? No   Do you need help managing money? No   Do you ever drive or ride in a car without wearing a seat belt? No   Have you felt unusual stress, anger or loneliness in the last month? No   Who do you live with? Other   If you need help, do you have trouble finding someone available to you? No   Have you been bothered in the last four weeks by sexual problems? No   Do you have difficulty concentrating, remembering or making decisions? Yes       Age-appropriate Screening Schedule:  Refer to the list below for future screening recommendations based on patient's age, sex and/or medical conditions. Orders for these recommended tests are listed in the plan section. The patient has been provided with a written plan.    Health Maintenance   Topic Date Due   • URINE MICROALBUMIN  Never done   • DXA SCAN  Never done   • COLORECTAL CANCER SCREENING  Never done   • DIABETIC FOOT EXAM  Never done   • DIABETIC EYE EXAM  Never done   • COVID-19 Vaccine (3 - Booster for Pfizer series) 06/11/2021   • TDAP/TD VACCINES (1 - Tdap) 08/11/2023 (Originally 3/23/1969)   • ZOSTER VACCINE (1 of 2) 08/11/2023 (Originally 3/23/1998)   • INFLUENZA VACCINE  08/01/2023   • HEMOGLOBIN A1C  08/05/2023   • ANNUAL WELLNESS VISIT  05/24/2024   • HEPATITIS C SCREENING  Completed   • Pneumococcal Vaccine 65+  Completed                  CMS Preventative Services Quick Reference  Risk Factors Identified During Encounter  None Identified  The above risks/problems have been discussed with the patient.  Pertinent information has been shared with the patient in the After Visit Summary.  An After Visit Summary and PPPS were made available to the patient.    Follow Up:   Next Medicare Wellness visit to be scheduled in 1 year.       Additional E&M Note during same encounter follows:  Patient has multiple medical problems which are significant and separately identifiable that require additional work above and beyond the  "Medicare Wellness Visit.      Chief Complaint  Medicare Wellness-subsequent and Urinary Tract Infection (Dysuria, urinary frequency x 1 week )    Subjective        --IMPROVED WITH CURRENT TREATMENT, CONTINUE SAME, WILL REEVALUATE AT NEXT VISIT   --HGA1C WAS DOWN TO 8 %, DOES NOT CHECK SUGAR AT HOME  --CONTINUES ON ROUTINE KLINIPIN AND AN SSRI FOR CHRONIC ANXIETY / DEPRESSION, STABLE  --DYSURIA FOR THE PAST FEW WEEKS  --DUE FOR LABS, A DEXA AND A SCREENING CHEST CT     Annie Friend is also being seen today for SUGAR, BLOOD PRESSURE, CHOLESTEROL, BURNING WITH URINATION    Review of Systems   Constitutional: Positive for fatigue. Negative for activity change, appetite change, chills and fever.   HENT: Negative for congestion, ear pain, hearing loss, rhinorrhea and sore throat.    Eyes: Negative for discharge and visual disturbance.   Respiratory: Negative for cough and shortness of breath.    Cardiovascular: Positive for leg swelling. Negative for chest pain and palpitations.   Gastrointestinal: Negative for abdominal pain, diarrhea, nausea and vomiting.   Genitourinary: Positive for frequency. Negative for hematuria.   Musculoskeletal: Positive for arthralgias. Negative for myalgias.   Psychiatric/Behavioral: Negative for dysphoric mood.       Objective   Vital Signs:  /73 (BP Location: Left arm, Patient Position: Sitting)   Pulse 81   Ht 166.4 cm (65.51\")   Wt 101 kg (223 lb)   BMI 36.53 kg/m²     Physical Exam  Vitals and nursing note reviewed.   Constitutional:       General: She is not in acute distress.     Appearance: Normal appearance.   HENT:      Right Ear: Tympanic membrane normal.      Left Ear: Tympanic membrane normal.      Mouth/Throat:      Pharynx: Oropharynx is clear.   Eyes:      Conjunctiva/sclera: Conjunctivae normal.   Cardiovascular:      Rate and Rhythm: Normal rate and regular rhythm.      Pulses:           Dorsalis pedis pulses are 2+ on the right side and 2+ on the left side. "        Posterior tibial pulses are 0 on the right side and 0 on the left side.      Heart sounds: Normal heart sounds. No murmur heard.  Pulmonary:      Effort: Pulmonary effort is normal.      Breath sounds: Normal breath sounds.   Abdominal:      General: Bowel sounds are normal.      Palpations: Abdomen is soft.      Tenderness: There is no abdominal tenderness.   Musculoskeletal:      Cervical back: Neck supple.      Right lower leg: Edema present.      Left lower leg: Edema present.      Right foot: Normal range of motion. No deformity, bunion, Charcot foot, foot drop or prominent metatarsal heads.      Left foot: Normal range of motion. No deformity, bunion, Charcot foot, foot drop or prominent metatarsal heads.   Feet:      Right foot:      Protective Sensation: 4 sites tested. 2 sites sensed.      Skin integrity: Skin integrity normal.      Toenail Condition: Right toenails are normal.      Left foot:      Protective Sensation: 4 sites tested. 2 sites sensed.      Skin integrity: Skin integrity normal.      Toenail Condition: Left toenails are normal.      Comments:      Lymphadenopathy:      Cervical: No cervical adenopathy.   Neurological:      General: No focal deficit present.      Mental Status: She is alert.      Cranial Nerves: No cranial nerve deficit.      Coordination: Coordination normal.      Gait: Gait normal.   Psychiatric:         Mood and Affect: Mood normal.         Behavior: Behavior normal.          The following data was reviewed by: Aroldo Hickey MD on 05/24/2023:  Common labs        8/11/2022    10:51 11/15/2022    17:13   Common Labs   Glucose 166   156     BUN 20   24     Creatinine 1.08   0.90     Sodium 138   138     Potassium 4.4   4.6     Chloride 95   94     Calcium 9.3   9.4     Albumin 3.90   3.90     Total Bilirubin 0.5   0.3     Alkaline Phosphatase 81   94     AST (SGOT) 18   18     ALT (SGPT) 12   14     WBC 12.82      Hemoglobin 14.9      Hematocrit 48.8       Platelets 281      Total Cholesterol 150   174     Triglycerides 138   242     HDL Cholesterol 42   43     LDL Cholesterol  84   90     Hemoglobin A1C 8.50   8.00                  Assessment and Plan   Diagnoses and all orders for this visit:    1. Medicare annual wellness visit, subsequent (Primary)  Assessment & Plan:  ADVICE GIVEN RE:  SEATBELT USE, ALCOHOL USE, HEALTHY DIET, ROUTINE EYE AND DENTAL EXAM, ROUTINE VACCINATIONS.    ADVISED TO SPEAK TO HER PHARMACY ABOUT A SHINGRX  WILL SCHEDULE A CT CHEST AND A DEXA  DECLINES A COLONOSCOPY AND A COLOGUARD       2. Dysuria  Comments:  WILL COVER AS NOTED, EVEN IF SHE CANNOT PRODUCE A SPECIMIEN, CONSIDER UROLOGY EVAL   Orders:  -     Urinalysis With Microscopic - Urine, Clean Catch  -     sulfamethoxazole-trimethoprim (Bactrim DS) 800-160 MG per tablet; Take 1 tablet by mouth 2 (Two) Times a Day.  Dispense: 20 tablet; Refill: 0    3. Essential hypertension  -     CBC (No Diff)  -     Comprehensive Metabolic Panel  -     TSH Rfx On Abnormal To Free T4    4. Dysthymic disorder  Assessment & Plan:  Patient's depression is single episode and is moderate without psychosis. Their depression is currently in partial remission and the condition is improving with lifestyle modifications. This will be reassessed at the next regular appointment. F/U as described:patient will continue current medication therapy.  STABLE ON CURRENT MEDICATION.  TERA REVIEWED.  TOX SCREEN IS UP TO DATE.  THE CONTINUED USE OF A CONTROLLED SUBSTANCE IS NECESSARY FOR THIS PATIENT TO HAVE A NEAR NORMAL QUALITY OF LIFE AND WILL BE REVIEWED AT THE NEXT ROUTINE VISIT.      5. Type 2 diabetes mellitus treated without insulin  Assessment & Plan:  Diabetes is improving with treatment.   Continue current treatment regimen.  Reminded to get yearly retinal exam.  Diabetes will be reassessed in 6 months.    Orders:  -     Hemoglobin A1c  -     Lipid Panel    6. Postmenopausal state  -     DEXA Bone Density  Axial; Future    7. History of nicotine dependence  -     CT Chest Low Dose Wo; Future           Follow Up   Return in about 6 months (around 11/24/2023).  Patient was given instructions and counseling regarding her condition or for health maintenance advice. Please see specific information pulled into the AVS if appropriate.

## 2023-05-24 NOTE — ASSESSMENT & PLAN NOTE
ADVICE GIVEN RE:  SEATBELT USE, ALCOHOL USE, HEALTHY DIET, ROUTINE EYE AND DENTAL EXAM, ROUTINE VACCINATIONS.    ADVISED TO SPEAK TO HER PHARMACY ABOUT A SHINGRX  WILL SCHEDULE A CT CHEST AND A DEXA  DECLINES A COLONOSCOPY AND A COLOGUARD

## 2023-08-02 ENCOUNTER — TELEPHONE (OUTPATIENT)
Dept: FAMILY MEDICINE CLINIC | Age: 73
End: 2023-08-02

## 2023-08-02 NOTE — TELEPHONE ENCOUNTER
Patient is over due for her CT low dose chest test. She thought they said a Echo. Patient declined to make a appt and said she think about it. And call back if she wants to do the CT

## 2023-08-02 NOTE — TELEPHONE ENCOUNTER
Caller: Annie Friend    Relationship: Self    Best call back number: [phone]    What is the best time to reach you: ANYTIME    Who are you requesting to speak with (clinical staff, provider,  specific staff member): GHAZAL      What was the call regarding: PATIENT IS WANTING TO CHECK ON THE ECHO THAT SHE THOUGHT SHE WAS SUPPOSE TO HAVE, NOT SURE WHERE IT IS TO BE DONE AT.

## 2023-08-23 ENCOUNTER — TELEPHONE (OUTPATIENT)
Dept: FAMILY MEDICINE CLINIC | Age: 73
End: 2023-08-23
Payer: MEDICARE

## 2023-08-23 NOTE — TELEPHONE ENCOUNTER
Pt said Dr Christine the cardiologist put in a pace maker on Monday, 08/21/23.   He changed her clonzepam from three times a day to twice daily.  She is wanting Dr Hickey to know of the change.

## 2023-08-25 DIAGNOSIS — E11.9 TYPE 2 DIABETES MELLITUS TREATED WITHOUT INSULIN: ICD-10-CM

## 2023-08-25 RX ORDER — GLIMEPIRIDE 2 MG/1
TABLET ORAL
Qty: 180 TABLET | Refills: 0 | Status: SHIPPED | OUTPATIENT
Start: 2023-08-25

## 2023-08-25 RX ORDER — POTASSIUM CHLORIDE 1500 MG/1
TABLET, EXTENDED RELEASE ORAL
Qty: 360 TABLET | Refills: 0 | Status: SHIPPED | OUTPATIENT
Start: 2023-08-25

## 2023-09-30 DIAGNOSIS — F34.1 DYSTHYMIC DISORDER: ICD-10-CM

## 2023-10-02 RX ORDER — CLONAZEPAM 2 MG/1
TABLET ORAL
Qty: 30 TABLET | Refills: 0 | Status: SHIPPED | OUTPATIENT
Start: 2023-10-02

## 2023-10-02 NOTE — TELEPHONE ENCOUNTER
Rx Refill Note  Requested Prescriptions     Pending Prescriptions Disp Refills    clonazePAM (KlonoPIN) 2 MG tablet [Pharmacy Med Name: clonazePAM 2 MG Oral Tablet] 90 tablet 0     Sig: TAKE 1 TABLET BY MOUTH THREE TIMES DAILY AS NEEDED FOR ANXIETY      Last office visit with prescribing clinician: 5/24/2023   Last telemedicine visit with prescribing clinician: Visit date not found   Next office visit with prescribing clinician: Visit date not found  Last refill sent: 06/26/23, #90, 2 refills  POC Urine Drug Screen [facility] (08/11/2022 10:24)     Dr Hickey is out of the office until 10/10/23,  Sent to on call Dr Gorman.     Destiney Mims, SUNNY  10/02/23, 08:36 EDT

## 2023-10-18 DIAGNOSIS — F34.1 DYSTHYMIC DISORDER: ICD-10-CM

## 2023-10-18 NOTE — TELEPHONE ENCOUNTER
Caller: Annie Friend    Relationship: Self    Best call back number: [phone]    Requested Prescriptions:   Requested Prescriptions     Pending Prescriptions Disp Refills    clonazePAM (KlonoPIN) 2 MG tablet 30 tablet 0     Sig: Take 1 tablet by mouth 3 (Three) Times a Day As Needed. for anxiety        Pharmacy where request should be sent: [place] [address] [facility] [phone] [facility] [phone]      Last office visit with prescribing clinician: 5/24/2023   Last telemedicine visit with prescribing clinician: Visit date not found   Next office visit with prescribing clinician: Visit date not found     Additional details provided by patient: PATIENT IS ALMOST OUT OF MEDICATION, LAST PRESCRIPTION DID NOT HAVE REFILLS     Does the patient have less than a 3 day supply:  [x] Yes  [] No    Would you like a call back once the refill request has been completed: [] Yes [x] No    If the office needs to give you a call back, can they leave a voicemail: [] Yes [x] No    Marcello Zepeda Rep   10/18/23 17:26 EDT

## 2023-10-19 NOTE — TELEPHONE ENCOUNTER
Rx Refill Note  Requested Prescriptions     Pending Prescriptions Disp Refills    clonazePAM (KlonoPIN) 2 MG tablet 30 tablet 0     Sig: Take 1 tablet by mouth 3 (Three) Times a Day As Needed. for anxiety      Last office visit with prescribing clinician: 5/24/2023   Last telemedicine visit with prescribing clinician: Visit date not found   Next office visit with prescribing clinician: Visit date not found  Last refill sent: 10/02/23, #30 by on call Dr Gorman.   POC Urine Drug Screen [facility] (08/11/2022 10:24)     Destiney Mims LPN  10/19/23, 08:59 EDT

## 2023-10-20 RX ORDER — CLONAZEPAM 2 MG/1
2 TABLET ORAL 3 TIMES DAILY PRN
Qty: 90 TABLET | Refills: 2 | Status: SHIPPED | OUTPATIENT
Start: 2023-10-20

## 2023-10-22 DIAGNOSIS — F34.1 DYSTHYMIC DISORDER: ICD-10-CM

## 2023-10-23 ENCOUNTER — OFFICE VISIT (OUTPATIENT)
Dept: FAMILY MEDICINE CLINIC | Age: 73
End: 2023-10-23
Payer: MEDICARE

## 2023-10-23 ENCOUNTER — LAB (OUTPATIENT)
Dept: LAB | Facility: HOSPITAL | Age: 73
End: 2023-10-23
Payer: MEDICARE

## 2023-10-23 VITALS
HEART RATE: 62 BPM | HEIGHT: 66 IN | SYSTOLIC BLOOD PRESSURE: 99 MMHG | DIASTOLIC BLOOD PRESSURE: 62 MMHG | BODY MASS INDEX: 34.62 KG/M2 | OXYGEN SATURATION: 96 % | WEIGHT: 215.4 LBS

## 2023-10-23 DIAGNOSIS — Z79.899 HIGH RISK MEDICATION USE: ICD-10-CM

## 2023-10-23 DIAGNOSIS — F34.1 DYSTHYMIC DISORDER: ICD-10-CM

## 2023-10-23 DIAGNOSIS — I10 ESSENTIAL HYPERTENSION: ICD-10-CM

## 2023-10-23 DIAGNOSIS — B35.1 ONYCHOMYCOSIS: ICD-10-CM

## 2023-10-23 DIAGNOSIS — Z23 NEED FOR IMMUNIZATION AGAINST INFLUENZA: ICD-10-CM

## 2023-10-23 DIAGNOSIS — E11.9 TYPE 2 DIABETES MELLITUS TREATED WITHOUT INSULIN: Primary | ICD-10-CM

## 2023-10-23 DIAGNOSIS — E11.9 TYPE 2 DIABETES MELLITUS TREATED WITHOUT INSULIN: ICD-10-CM

## 2023-10-23 PROBLEM — Z00.00 MEDICARE ANNUAL WELLNESS VISIT, SUBSEQUENT: Status: RESOLVED | Noted: 2023-05-24 | Resolved: 2023-10-23

## 2023-10-23 PROBLEM — Z95.0 CARDIAC PACEMAKER IN SITU: Status: ACTIVE | Noted: 2023-10-23

## 2023-10-23 LAB
ALBUMIN SERPL-MCNC: 3.7 G/DL (ref 3.5–5.2)
ALBUMIN/GLOB SERPL: 1 G/DL
ALP SERPL-CCNC: 81 U/L (ref 39–117)
ALT SERPL W P-5'-P-CCNC: 19 U/L (ref 1–33)
AMPHET+METHAMPHET UR QL: NEGATIVE
AMPHETAMINES UR QL: NEGATIVE
ANION GAP SERPL CALCULATED.3IONS-SCNC: 6 MMOL/L (ref 5–15)
AST SERPL-CCNC: 21 U/L (ref 1–32)
BARBITURATES UR QL SCN: NEGATIVE
BENZODIAZ UR QL SCN: NEGATIVE
BILIRUB SERPL-MCNC: 0.3 MG/DL (ref 0–1.2)
BUN SERPL-MCNC: 23 MG/DL (ref 8–23)
BUN/CREAT SERPL: 25 (ref 7–25)
BUPRENORPHINE SERPL-MCNC: NEGATIVE NG/ML
CALCIUM SPEC-SCNC: 9.3 MG/DL (ref 8.6–10.5)
CANNABINOIDS SERPL QL: NEGATIVE
CHLORIDE SERPL-SCNC: 98 MMOL/L (ref 98–107)
CHOLEST SERPL-MCNC: 127 MG/DL (ref 0–200)
CO2 SERPL-SCNC: 34 MMOL/L (ref 22–29)
COCAINE UR QL: NEGATIVE
CREAT SERPL-MCNC: 0.92 MG/DL (ref 0.57–1)
DEPRECATED RDW RBC AUTO: 40.8 FL (ref 37–54)
EGFRCR SERPLBLD CKD-EPI 2021: 65.1 ML/MIN/1.73
ERYTHROCYTE [DISTWIDTH] IN BLOOD BY AUTOMATED COUNT: 12 % (ref 12.3–15.4)
EXPIRATION DATE: NORMAL
GLOBULIN UR ELPH-MCNC: 3.8 GM/DL
GLUCOSE SERPL-MCNC: 67 MG/DL (ref 65–99)
HBA1C MFR BLD: 5.9 % (ref 4.8–5.6)
HCT VFR BLD AUTO: 43 % (ref 34–46.6)
HDLC SERPL-MCNC: 37 MG/DL (ref 40–60)
HGB BLD-MCNC: 14.6 G/DL (ref 12–15.9)
LDLC SERPL CALC-MCNC: 64 MG/DL (ref 0–100)
LDLC/HDLC SERPL: 1.63 {RATIO}
Lab: NORMAL
MCH RBC QN AUTO: 31.4 PG (ref 26.6–33)
MCHC RBC AUTO-ENTMCNC: 34 G/DL (ref 31.5–35.7)
MCV RBC AUTO: 92.5 FL (ref 79–97)
MDMA UR QL SCN: NEGATIVE
METHADONE UR QL SCN: NEGATIVE
OPIATES UR QL: NEGATIVE
OXYCODONE UR QL SCN: NEGATIVE
PCP UR QL SCN: NEGATIVE
PLATELET # BLD AUTO: 301 10*3/MM3 (ref 140–450)
PMV BLD AUTO: 9.2 FL (ref 6–12)
POTASSIUM SERPL-SCNC: 5.2 MMOL/L (ref 3.5–5.2)
PROT SERPL-MCNC: 7.5 G/DL (ref 6–8.5)
RBC # BLD AUTO: 4.65 10*6/MM3 (ref 3.77–5.28)
SODIUM SERPL-SCNC: 138 MMOL/L (ref 136–145)
TRIGL SERPL-MCNC: 149 MG/DL (ref 0–150)
TSH SERPL DL<=0.05 MIU/L-ACNC: 2.52 UIU/ML (ref 0.27–4.2)
VLDLC SERPL-MCNC: 26 MG/DL (ref 5–40)
WBC NRBC COR # BLD: 9.26 10*3/MM3 (ref 3.4–10.8)

## 2023-10-23 PROCEDURE — 85027 COMPLETE CBC AUTOMATED: CPT | Performed by: FAMILY MEDICINE

## 2023-10-23 PROCEDURE — 83036 HEMOGLOBIN GLYCOSYLATED A1C: CPT | Performed by: FAMILY MEDICINE

## 2023-10-23 PROCEDURE — 84443 ASSAY THYROID STIM HORMONE: CPT | Performed by: FAMILY MEDICINE

## 2023-10-23 PROCEDURE — 36415 COLL VENOUS BLD VENIPUNCTURE: CPT | Performed by: FAMILY MEDICINE

## 2023-10-23 PROCEDURE — 80061 LIPID PANEL: CPT | Performed by: FAMILY MEDICINE

## 2023-10-23 PROCEDURE — 80053 COMPREHEN METABOLIC PANEL: CPT | Performed by: FAMILY MEDICINE

## 2023-10-23 RX ORDER — VENLAFAXINE HYDROCHLORIDE 150 MG/1
150 CAPSULE, EXTENDED RELEASE ORAL DAILY
Qty: 90 CAPSULE | Refills: 0 | Status: SHIPPED | OUTPATIENT
Start: 2023-10-23

## 2023-10-23 RX ORDER — GLIMEPIRIDE 2 MG/1
TABLET ORAL
Qty: 180 TABLET | Refills: 0 | Status: SHIPPED | OUTPATIENT
Start: 2023-10-23

## 2023-10-23 NOTE — ASSESSMENT & PLAN NOTE
Diabetes is improving with treatment.   Continue current treatment regimen.  Discussed foot care.  Reminded to get yearly retinal exam.  Diabetes will be reassessed in 3 months  NOT QUITE AT GOAL, WILL RECHECK LABS AND PROCEED ACCORDINGLY .

## 2023-10-23 NOTE — PROGRESS NOTES
Chief Complaint  Diabetes (Follow up)    Subjective          Annie Friend presents to [facility]  History of Present Illness  --TOLERATING BLOOD PRESSURE MEDICATION WITHOUT APPARENT SIDE EFFECTS  --LAST HGA1C WAS 7.2 %, DUE FOR A FOOT EXAM  --CONTINUES ON ROUTINE KLONOPIN AND AN SSRI FOR CHRONIC DEPRESSION WITH ANXIETY, STABLE  --TOENAILS ARE THICK AND FLAKING, ASKING TO SEE A PODIATRIST         Allergies   Allergen Reactions    Codeine Rash    Penicillins Rash        Health Maintenance Due   Topic Date Due    URINE MICROALBUMIN  Never done    DXA SCAN  Never done    COLORECTAL CANCER SCREENING  Never done    TDAP/TD VACCINES (1 - Tdap) Never done    ZOSTER VACCINE (1 of 2) Never done    LUNG CANCER SCREENING  Never done    DIABETIC FOOT EXAM  Never done    DIABETIC EYE EXAM  Never done    COVID-19 Vaccine (3 - 2023-24 season) 09/01/2023        Current Outpatient Medications on File Prior to Visit   Medication Sig    apixaban (ELIQUIS) 5 MG tablet tablet Take 1 tablet by mouth 2 (Two) Times a Day.    bumetanide (BUMEX) 2 MG tablet Take 2 tablets by mouth Daily.    clonazePAM (KlonoPIN) 2 MG tablet Take 1 tablet by mouth 3 (Three) Times a Day As Needed for Anxiety. for anxiety    clotrimazole-betamethasone (Lotrisone) 1-0.05 % cream Apply 1 application topically to the appropriate area as directed 2 (Two) Times a Day. Apply to affected area bid    digoxin (LANOXIN) 125 MCG tablet Take 1 tablet by mouth once daily    docusate sodium 100 MG capsule Take 1 capsule by mouth 2 (two) times a day.    ketoconazole (NIZORAL) 2 % cream Apply 1 application topically to the appropriate area as directed Daily.    Metoprolol Tartrate 75 MG tablet Take 1 tablet by mouth Every 12 (Twelve) Hours.    ondansetron ODT (Zofran ODT) 4 MG disintegrating tablet Place 1 tablet on the tongue Every 8 (Eight) Hours As Needed for Nausea or Vomiting.    potassium chloride ER (K-TAB) 20 MEQ tablet  "controlled-release ER tablet Take 2 tablets by mouth twice daily    venlafaxine XR (EFFEXOR-XR) 150 MG 24 hr capsule Take 1 capsule by mouth once daily    [DISCONTINUED] glimepiride (AMARYL) 2 MG tablet Take 1 tablet by mouth twice daily    [DISCONTINUED] sulfamethoxazole-trimethoprim (Bactrim DS) 800-160 MG per tablet Take 1 tablet by mouth 2 (Two) Times a Day.    [DISCONTINUED] venlafaxine XR (EFFEXOR-XR) 150 MG 24 hr capsule Take 1 capsule by mouth once daily     No current facility-administered medications on file prior to visit.       Immunization History   Administered Date(s) Administered    COVID-19 (PFIZER) Purple Cap Monovalent 03/26/2021, 04/16/2021    Fluzone High Dose =>65 Years ([employer] ONLY) 10/13/2017    Fluzone High-Dose 65+yrs 11/15/2022, 10/23/2023    Influenza, Unspecified 10/10/2019    PPD Test 05/26/2015    Pneumococcal Conjugate 13-Valent (PCV13) 06/12/2017    Pneumococcal Conjugate 20-Valent (PCV20) 02/10/2023       Review of Systems   Constitutional:  Negative for activity change, appetite change, chills, fatigue and fever.   HENT:  Negative for congestion, ear pain, rhinorrhea and sore throat.    Respiratory:  Negative for cough and shortness of breath.    Cardiovascular:  Negative for chest pain, palpitations and leg swelling.   Gastrointestinal:  Negative for abdominal pain, constipation, diarrhea, nausea and vomiting.   Musculoskeletal:  Negative for arthralgias and myalgias.   Neurological:  Negative for headache.        Objective     BP 99/62 (BP Location: Left arm, Patient Position: Sitting)   Pulse 62   Ht 166.4 cm (65.5\")   Wt 97.7 kg (215 lb 6.4 oz)   SpO2 96% Comment: on room air  BMI 35.30 kg/m²       Physical Exam  Vitals and nursing note reviewed.   Constitutional:       General: She is not in acute distress.     Appearance: Normal appearance.   Cardiovascular:      Rate and Rhythm: Normal rate and regular rhythm.      Pulses:           Dorsalis pedis pulses are 1+ on the " right side and 1+ on the left side.        Posterior tibial pulses are 1+ on the right side and 1+ on the left side.      Heart sounds: Normal heart sounds. No murmur heard.  Pulmonary:      Effort: Pulmonary effort is normal.      Breath sounds: Normal breath sounds.   Abdominal:      Palpations: Abdomen is soft.      Tenderness: There is no abdominal tenderness.   Musculoskeletal:      Cervical back: Neck supple.      Right lower leg: No edema.      Left lower leg: No edema.      Right foot: Normal range of motion. No deformity, bunion, Charcot foot, foot drop or prominent metatarsal heads.      Left foot: Normal range of motion. No deformity, bunion, Charcot foot, foot drop or prominent metatarsal heads.   Feet:      Right foot:      Protective Sensation: 4 sites tested.  4 sites sensed.      Skin integrity: Skin integrity normal.      Toenail Condition: Right toenails are abnormally thick.      Left foot:      Protective Sensation: 4 sites tested.  4 sites sensed.      Skin integrity: Skin integrity normal.      Toenail Condition: Left toenails are abnormally thick.      Comments:      Lymphadenopathy:      Cervical: No cervical adenopathy.   Neurological:      General: No focal deficit present.      Mental Status: She is alert.      Cranial Nerves: No cranial nerve deficit.      Coordination: Coordination normal.      Gait: Gait normal.   Psychiatric:         Mood and Affect: Mood normal.         Behavior: Behavior normal.         Result Review :                             Assessment and Plan      Diagnoses and all orders for this visit:    1. Type 2 diabetes mellitus treated without insulin (Primary)  Assessment & Plan:  Diabetes is improving with treatment.   Continue current treatment regimen.  Discussed foot care.  Reminded to get yearly retinal exam.  Diabetes will be reassessed in 3 months  NOT QUITE AT GOAL, WILL RECHECK LABS AND PROCEED ACCORDINGLY .    Orders:  -     Hemoglobin A1c  -     Lipid  Panel    2. Dysthymic disorder  Assessment & Plan:  Patient's depression is single episode and is moderate without psychosis. Their depression is currently in partial remission and the condition is improving with treatment. This will be reassessed at the next regular appointment. F/U as described:patient will continue current medication therapySTABLE ON CURRENT MEDICATION.  TERA REVIEWED.  TOX SCREEN IS UP TO DATE.  THE CONTINUED USE OF A CONTROLLED SUBSTANCE IS NECESSARY FOR THIS PATIENT TO HAVE A NEAR NORMAL QUALITY OF LIFE AND WILL BE REVIEWED AT THE NEXT ROUTINE VISIT..      3. Essential hypertension  Assessment & Plan:  Hypertension is improving with treatment.  Continue current treatment regimen.  Blood pressure will be reassessed at the next regular appointment.    Orders:  -     CBC (No Diff)  -     Comprehensive Metabolic Panel  -     TSH Rfx On Abnormal To Free T4    4. Onychomycosis  -     Ambulatory Referral to Podiatry    5. High risk medication use  -     POC Urine Drug Screen [facility]    6. Need for immunization against influenza  -     Fluzone High-Dose 65+yrs ([id number])            Follow Up     Return in about 6 months (around 4/23/2024).    Patient was given instructions and counseling regarding her condition or for health maintenance advice. Please see specific information pulled into the AVS if appropriate.

## 2023-10-23 NOTE — ASSESSMENT & PLAN NOTE
Patient's depression is single episode and is moderate without psychosis. Their depression is currently in partial remission and the condition is improving with treatment. This will be reassessed at the next regular appointment. F/U as described:patient will continue current medication therapySTABLE ON CURRENT MEDICATION.  TERA REVIEWED.  TOX SCREEN IS UP TO DATE.  THE CONTINUED USE OF A CONTROLLED SUBSTANCE IS NECESSARY FOR THIS PATIENT TO HAVE A NEAR NORMAL QUALITY OF LIFE AND WILL BE REVIEWED AT THE NEXT ROUTINE VISIT..

## 2023-10-23 NOTE — TELEPHONE ENCOUNTER
Rx Refill Note  Requested Prescriptions     Pending Prescriptions Disp Refills    venlafaxine XR (EFFEXOR-XR) 150 MG 24 hr capsule [Pharmacy Med Name: Venlafaxine HCl  MG Oral Capsule Extended Release 24 Hour] 90 capsule 0     Sig: Take 1 capsule by mouth once daily      Last office visit with prescribing clinician: 5/24/2023   Last telemedicine visit with prescribing clinician: Visit date not found   Next office visit with prescribing clinician: 10/26/2023      Destiney Mims LPN  10/23/23, 09:36 EDT

## 2023-11-01 ENCOUNTER — TELEPHONE (OUTPATIENT)
Dept: FAMILY MEDICINE CLINIC | Age: 73
End: 2023-11-01
Payer: MEDICARE

## 2023-11-01 NOTE — TELEPHONE ENCOUNTER
Caller: Annie Friend    Relationship: Self    Best call back number: [phone]     Caller requesting test results: ANNIE     What test was performed: BLOOD WORK     When was the test performed: 10-30-23    Where was the test performed: [facility]     Additional notes: REQUESTING A CALL BACK REGARDING RESULTS     PLEASE ADVISE

## 2023-11-02 NOTE — TELEPHONE ENCOUNTER
Pt informed about recent lab work results.  Mailed out last week, pt should be receiving them through the mail also.

## 2023-12-01 ENCOUNTER — TELEPHONE (OUTPATIENT)
Dept: FAMILY MEDICINE CLINIC | Age: 73
End: 2023-12-01
Payer: MEDICARE

## 2023-12-01 NOTE — TELEPHONE ENCOUNTER
Caller: Annie Friend    Relationship: Self    Best call back number: [phone]    What medication are you requesting: ANTIBIOTIC    What are your current symptoms: N/A    How long have you been experiencing symptoms: N/A    Have you had these symptoms before:    [] Yes  [] No    Have you been treated for these symptoms before:   [] Yes  [] No    If a prescription is needed, what is your preferred pharmacy and phone number: [facility] [address] [place] [address] [address] [facility] [phone] [facility] [phone] FX     Additional notes:  PATIENT IS HAVING UTI SYMPTOMS AND DIARRHEA THAT IS PREVENTING HER FROM COMING IN TODAY. SHE WOULD LIKE ANTIBIOTIC PRESCRIPTION SENT TO PHARMACY TODAY [facility]. PLEASE SEND NEW SCRIPT.  IF THIS IS NOT POSSIBLE, PLEASE CALL PATIENT AND LET HER KNOW.

## 2023-12-01 NOTE — TELEPHONE ENCOUNTER
Patient states she can not get in today. Having burning and frequent urination starting this morning. Diarrhea also started today. No fever, nausea. Please advise and thank you

## 2023-12-02 NOTE — TELEPHONE ENCOUNTER
NEEDS TO BE EVALUATED BEFORE SHE CAN BE TREATED.  SHE CAN HAVE A SAMPLE DROPPED OFF OR A U/A TO SEE IF SHE HAS A UTI BUT, IF THE DIARRHEA [id number] SHE WOULD NEED TO BE SEEN IN PERSON.

## 2023-12-04 RX ORDER — POTASSIUM CHLORIDE 1500 MG/1
TABLET, EXTENDED RELEASE ORAL
Qty: 360 TABLET | Refills: 0 | Status: SHIPPED | OUTPATIENT
Start: 2023-12-04

## 2024-01-16 DIAGNOSIS — F34.1 DYSTHYMIC DISORDER: ICD-10-CM

## 2024-01-16 RX ORDER — VENLAFAXINE HYDROCHLORIDE 150 MG/1
150 CAPSULE, EXTENDED RELEASE ORAL DAILY
Qty: 90 CAPSULE | Refills: 0 | Status: SHIPPED | OUTPATIENT
Start: 2024-01-16

## 2024-01-22 ENCOUNTER — TELEPHONE (OUTPATIENT)
Dept: FAMILY MEDICINE CLINIC | Age: 74
End: 2024-01-22
Payer: MEDICARE

## 2024-01-22 NOTE — TELEPHONE ENCOUNTER
Caller: Annie Friend    Relationship: Self    Best call back number: [phone]     What is the best time to reach you: ANY    Who are you requesting to speak with (clinical staff, provider,  specific staff member): CLINICAL STAFF    What was the call regarding: PATIENT CALLED STATING THAT SHE HAS BEEN HAVING DIARRHEA FOR QUITE SOME TIME AND SHE WOULD LIKE TO KNOW IF IT IS NORMAL THAT THIS IS COMMON AFTER HER BOWEL SURGERY.

## 2024-01-23 NOTE — TELEPHONE ENCOUNTER
Looks like she had a parcel small bowel resection in January. She would need to discuss this with the surgeon who did the procedure.

## 2024-02-01 ENCOUNTER — TELEPHONE (OUTPATIENT)
Dept: FAMILY MEDICINE CLINIC | Age: 74
End: 2024-02-01
Payer: MEDICARE

## 2024-02-01 DIAGNOSIS — F34.1 DYSTHYMIC DISORDER: ICD-10-CM

## 2024-02-01 RX ORDER — CLONAZEPAM 2 MG/1
2 TABLET ORAL 3 TIMES DAILY PRN
Qty: 90 TABLET | Refills: 0 | Status: SHIPPED | OUTPATIENT
Start: 2024-02-01

## 2024-02-01 NOTE — TELEPHONE ENCOUNTER
Rx Refill Note  Requested Prescriptions     Pending Prescriptions Disp Refills    clonazePAM (KlonoPIN) 2 MG tablet [Pharmacy Med Name: clonazePAM 2 MG Oral Tablet] 90 tablet 0     Sig: TAKE 1 TABLET BY MOUTH THREE TIMES DAILY AS NEEDED FOR ANXIETY      Last office visit with prescribing clinician: 10/23/2023   Last telemedicine visit with prescribing clinician: Visit date not found   Next office visit with prescribing clinician: 4/24/2024  Last refill sent: 10/20/23, #90, 2 refills  POC Urine Drug Screen [facility] (10/23/2023 16:13)     Destiney Mims LPN  02/01/24, 09:31 EST

## 2024-02-01 NOTE — TELEPHONE ENCOUNTER
Talked to Todd at [facility] pharmacy, he said they needed to know if she has elevated cholesterol and need to be on a statin since she is diabetic?   Lab 10/23/23- results given to pharmacist.

## 2024-02-01 NOTE — TELEPHONE ENCOUNTER
Caller: NYASIA    Relationship: [facility]    Best call back number: [phone] ASK TO SPEAK TO PHARMACIST WHEN RECORDING STARTS.    What is the best time to reach you: ANYTIME 8AM-7PM    Who are you requesting to speak with (clinical staff, provider,  specific staff member): CLINICAL    Do you know the name of the person who called: NYASIA     What was the call regarding: SHE WANTED TO KNOW IF PATIENT HAS TRIED A STATIN OR IF YOU WOULD WANT TO PRESCRIBE THIS FOR THIS PATIENT SINCE SHE IS DIABETIC. PLEASE CALL PHARMACIST BACK AND ADVISE.    Is it okay if the provider responds through MyChart: N/A

## 2024-02-05 ENCOUNTER — TELEPHONE (OUTPATIENT)
Dept: FAMILY MEDICINE CLINIC | Age: 74
End: 2024-02-05
Payer: MEDICARE

## 2024-02-05 NOTE — TELEPHONE ENCOUNTER
Pt states she has gotten refills in the past and wanted to know why there were no refills attached to most recent rx

## 2024-02-05 NOTE — TELEPHONE ENCOUNTER
Caller: Annie Friend    Relationship: Self    Best call back number: [phone]     What is the best time to reach you: ANYTIME    Who are you requesting to speak with (clinical staff, provider,  specific staff member): CLINICAL    What was the call regarding: PATIENT STATES THAT SHE USUALLY GETS 3 REFILLS OF KLONOPIN AND SHE ONLY HAS A 1 MONTH SUPPLY OF THIS MEDICATION NOW WITH NO REFILLS. PLEASE CALL TO ADVISE.     Is it okay if the provider responds through MyChart: PHONE CALL

## 2024-02-06 ENCOUNTER — TELEPHONE (OUTPATIENT)
Dept: FAMILY MEDICINE CLINIC | Age: 74
End: 2024-02-06
Payer: MEDICARE

## 2024-02-06 NOTE — TELEPHONE ENCOUNTER
Caller: Annie Friend    Relationship: Self    Best call back number: [phone]    What is the best time to reach you: ANYTIME     Who are you requesting to speak with (clinical staff, provider,  specific staff member): NURSE         What was the call regarding: PATIENT IS WAITING ON A CALL OF WHY SHE DID NOT GET REFILLS ON HER CLONAZEPAM?  PLEASE GIVE HER A CALL AS SOON AS POSSIBLE TO EXPLAIN.

## 2024-02-17 DIAGNOSIS — E11.9 TYPE 2 DIABETES MELLITUS TREATED WITHOUT INSULIN: ICD-10-CM

## 2024-02-19 RX ORDER — GLIMEPIRIDE 2 MG/1
TABLET ORAL
Qty: 180 TABLET | Refills: 0 | Status: SHIPPED | OUTPATIENT
Start: 2024-02-19

## 2024-03-01 RX ORDER — POTASSIUM CHLORIDE 1500 MG/1
TABLET, EXTENDED RELEASE ORAL
Qty: 360 TABLET | Refills: 0 | Status: SHIPPED | OUTPATIENT
Start: 2024-03-01

## 2024-03-04 DIAGNOSIS — F34.1 DYSTHYMIC DISORDER: ICD-10-CM

## 2024-03-04 RX ORDER — CLONAZEPAM 2 MG/1
2 TABLET ORAL 3 TIMES DAILY PRN
Qty: 90 TABLET | Refills: 0 | Status: SHIPPED | OUTPATIENT
Start: 2024-03-04

## 2024-03-04 NOTE — TELEPHONE ENCOUNTER
Rx Refill Note  Requested Prescriptions     Pending Prescriptions Disp Refills    clonazePAM (KlonoPIN) 2 MG tablet [Pharmacy Med Name: clonazePAM 2 MG Oral Tablet] 90 tablet 0     Sig: TAKE 1 TABLET BY MOUTH THREE TIMES DAILY AS NEEDED FOR ANXIETY      Last office visit with prescribing clinician: 10/23/2023   Last telemedicine visit with prescribing clinician: Visit date not found   Next office visit with prescribing clinician: 4/24/2024  Last refill sent: 02/01/24, #90  POC Urine Drug Screen [facility] (10/23/2023 16:13)     Destiney Mims LPN  03/04/24, 11:04 EST

## 2024-03-12 DIAGNOSIS — F34.1 DYSTHYMIC DISORDER: ICD-10-CM

## 2024-03-12 RX ORDER — CLONAZEPAM 2 MG/1
2 TABLET ORAL 3 TIMES DAILY PRN
Qty: 90 TABLET | Refills: 0 | OUTPATIENT
Start: 2024-03-12

## 2024-03-12 NOTE — TELEPHONE ENCOUNTER
PATIENT IS REQUESTING THAT ADDITIONAL REFILLS BE ADDED TO THE FOLLOWING MEDICATION      clonazePAM (KlonoPIN) 2 MG tablet

## 2024-03-13 RX ORDER — CLONAZEPAM 2 MG/1
2 TABLET ORAL 3 TIMES DAILY PRN
Qty: 90 TABLET | Refills: 2 | Status: SHIPPED | OUTPATIENT
Start: 2024-03-13

## 2024-03-13 NOTE — TELEPHONE ENCOUNTER
Clonazepam refill sent 03/04/24, #90 with no refills.   Pt is asking him to put refills on it so she doesn't have to call every month.

## 2024-03-14 ENCOUNTER — TELEPHONE (OUTPATIENT)
Dept: FAMILY MEDICINE CLINIC | Age: 74
End: 2024-03-14
Payer: MEDICARE

## 2024-03-14 NOTE — TELEPHONE ENCOUNTER
Called and talked to Nataly with [employer] and she said they received paperwork for diabetic supplies.  Nothing in notes said anything about home health. She was thinking that maybe it was sent to them in error.  They will disregard unless they hear back from our office.

## 2024-03-14 NOTE — TELEPHONE ENCOUNTER
RECEIVED INCOMING CALL FROM [facility] THIS MORNING STATING THAT THEY RECEIVED DOCUMENTATION FROM OUR OFFICE REGARDING THIS PATIENT.     I DO NOT SEE REFERRAL ORDER IN PATIENT'S CHART AND DO NOT SEE DOCUMENTATION WHERE DR. WALDROP WANTS THE PATIENT TO SEE [facility], HOWEVER I MAY HAVE MISSED IT.     ROUTING TO TEAM 2 & DR. WALDROP.     PLEASE ADVISE.

## 2024-04-11 DIAGNOSIS — F34.1 DYSTHYMIC DISORDER: ICD-10-CM

## 2024-04-11 RX ORDER — VENLAFAXINE HYDROCHLORIDE 150 MG/1
150 CAPSULE, EXTENDED RELEASE ORAL DAILY
Qty: 90 CAPSULE | Refills: 0 | Status: SHIPPED | OUTPATIENT
Start: 2024-04-11

## 2024-05-08 ENCOUNTER — TELEPHONE (OUTPATIENT)
Dept: FAMILY MEDICINE CLINIC | Age: 74
End: 2024-05-08
Payer: MEDICARE

## 2024-05-20 ENCOUNTER — OFFICE VISIT (OUTPATIENT)
Dept: FAMILY MEDICINE CLINIC | Age: 74
End: 2024-05-20
Payer: MEDICARE

## 2024-05-20 VITALS
WEIGHT: 210.2 LBS | BODY MASS INDEX: 33.78 KG/M2 | DIASTOLIC BLOOD PRESSURE: 57 MMHG | HEART RATE: 66 BPM | HEIGHT: 66 IN | SYSTOLIC BLOOD PRESSURE: 98 MMHG

## 2024-05-20 DIAGNOSIS — I10 ESSENTIAL HYPERTENSION: ICD-10-CM

## 2024-05-20 DIAGNOSIS — L65.9 THINNING HAIR: ICD-10-CM

## 2024-05-20 DIAGNOSIS — M17.0 PRIMARY OSTEOARTHRITIS OF KNEES, BILATERAL: ICD-10-CM

## 2024-05-20 DIAGNOSIS — F34.1 DYSTHYMIC DISORDER: Primary | ICD-10-CM

## 2024-05-20 DIAGNOSIS — Z87.891 HISTORY OF NICOTINE DEPENDENCE: ICD-10-CM

## 2024-05-20 DIAGNOSIS — E11.9 TYPE 2 DIABETES MELLITUS TREATED WITHOUT INSULIN: ICD-10-CM

## 2024-05-20 DIAGNOSIS — I87.2 VENOUS INSUFFICIENCY: ICD-10-CM

## 2024-05-20 PROCEDURE — 1160F RVW MEDS BY RX/DR IN RCRD: CPT | Performed by: FAMILY MEDICINE

## 2024-05-20 PROCEDURE — 3074F SYST BP LT 130 MM HG: CPT | Performed by: FAMILY MEDICINE

## 2024-05-20 PROCEDURE — 1159F MED LIST DOCD IN RCRD: CPT | Performed by: FAMILY MEDICINE

## 2024-05-20 PROCEDURE — G2211 COMPLEX E/M VISIT ADD ON: HCPCS | Performed by: FAMILY MEDICINE

## 2024-05-20 PROCEDURE — 3078F DIAST BP <80 MM HG: CPT | Performed by: FAMILY MEDICINE

## 2024-05-20 PROCEDURE — 99214 OFFICE O/P EST MOD 30 MIN: CPT | Performed by: FAMILY MEDICINE

## 2024-05-20 NOTE — PROGRESS NOTES
Chief Complaint  Diabetes (6 months)    Subjective          Annie Friend presents to [facility]  History of Present Illness  --TOLERATING BLOOD PRESSURE MEDICATION WITHOUT APPARENT SIDE EFFECTS  --LAST HGA1C WAS < 6 %  --CONTINUES ON ROUTINE KLONOPIN FOR CHRONIC ANXIETY / DEPRESSION, MADE WORSE BY THE LOSS OF A DAUGHTER A COUPLE OF YEARS AGO, STABLE  --WOULD LIKE TO SEE DERMATOLOGY FOR THINNING HAIR  --WOULD LIKE TO SEE ORTHO FOR HER KNEE OA  --LOWER LEG EDEMA PERSISTS BUT IMPROVES WITH ELEVATION, IS ALREADY ON DIURETICS FROM CARDIOLOGY        Allergies   Allergen Reactions    Codeine Rash    Penicillins Rash        Health Maintenance Due   Topic Date Due    URINE MICROALBUMIN  Never done    DXA SCAN  Never done    LUNG CANCER SCREENING  Never done    COVID-19 Vaccine (3 - 2023-24 season) 09/01/2023    ANNUAL WELLNESS VISIT  05/24/2024    DIABETIC EYE EXAM  06/28/2024    HEMOGLOBIN A1C  06/30/2024        Current Outpatient Medications on File Prior to Visit   Medication Sig    apixaban (ELIQUIS) 5 MG tablet tablet Take 1 tablet by mouth 2 (Two) Times a Day.    bumetanide (BUMEX) 2 MG tablet Take 2 tablets by mouth Daily.    clonazePAM (KlonoPIN) 2 MG tablet Take 1 tablet by mouth 3 (Three) Times a Day As Needed for Anxiety. for anxiety    clotrimazole-betamethasone (Lotrisone) 1-0.05 % cream Apply 1 application topically to the appropriate area as directed 2 (Two) Times a Day. Apply to affected area bid    digoxin (LANOXIN) 125 MCG tablet Take 1 tablet by mouth once daily (Patient taking differently: Take 1 tablet by mouth Every Other Day.)    docusate sodium 100 MG capsule Take 1 capsule by mouth 2 (two) times a day.    glimepiride (AMARYL) 2 MG tablet Take 1 tablet by mouth twice daily    ketoconazole (NIZORAL) 2 % cream Apply 1 application topically to the appropriate area as directed Daily.    Metoprolol Tartrate 75 MG tablet Take 1 tablet by mouth Every 12 (Twelve) Hours.  "   ondansetron ODT (Zofran ODT) 4 MG disintegrating tablet Place 1 tablet on the tongue Every 8 (Eight) Hours As Needed for Nausea or Vomiting.    potassium chloride ER (K-TAB) 20 MEQ tablet controlled-release ER tablet Take 2 tablets by mouth twice daily    venlafaxine XR (EFFEXOR-XR) 150 MG 24 hr capsule Take 1 capsule by mouth once daily     No current facility-administered medications on file prior to visit.       Immunization History   Administered Date(s) Administered    COVID-19 (PFIZER) Purple Cap Monovalent 03/26/2021, 04/16/2021    Fluzone High Dose =>65 Years ([employer] ONLY) 10/13/2017    Fluzone High-Dose 65+yrs 11/15/2022, 10/23/2023    Influenza, Unspecified 10/10/2019    PPD Test 05/26/2015    Pneumococcal Conjugate 13-Valent (PCV13) 06/12/2017    Pneumococcal Conjugate 20-Valent (PCV20) 02/10/2023       Review of Systems   Constitutional:  Negative for activity change, appetite change, chills, fatigue and fever.   HENT:  Negative for congestion, ear pain, rhinorrhea and sore throat.    Respiratory:  Negative for cough and shortness of breath.    Cardiovascular:  Negative for chest pain and palpitations.   Gastrointestinal:  Negative for abdominal pain, constipation, diarrhea, nausea and vomiting.   Musculoskeletal:  Negative for arthralgias and myalgias.   Neurological:  Negative for headache.        Objective     BP 98/57 (BP Location: Right arm, Patient Position: Sitting)   Pulse 66   Ht 166.4 cm (65.5\")   Wt 95.3 kg (210 lb 3.2 oz)   BMI 34.45 kg/m²       Physical Exam  Vitals and nursing note reviewed.   Constitutional:       General: She is not in acute distress.     Appearance: Normal appearance.   Cardiovascular:      Rate and Rhythm: Normal rate and regular rhythm.      Heart sounds: Normal heart sounds. No murmur heard.  Pulmonary:      Effort: Pulmonary effort is normal.      Breath sounds: Normal breath sounds.   Abdominal:      Palpations: Abdomen is soft.      Tenderness: There is no " abdominal tenderness.   Musculoskeletal:      Cervical back: Neck supple.      Right lower leg: Edema present.      Left lower leg: Edema present.   Lymphadenopathy:      Cervical: No cervical adenopathy.   Neurological:      General: No focal deficit present.      Mental Status: She is alert.      Cranial Nerves: No cranial nerve deficit.      Coordination: Coordination normal.      Gait: Gait normal.   Psychiatric:         Mood and Affect: Mood normal.         Behavior: Behavior normal.         Result Review :                             Assessment and Plan      Diagnoses and all orders for this visit:    1. Dysthymic disorder (Primary)  Assessment & Plan:  Patient's depression is a single episode that is moderate without psychosis. Depression is in partial remission and stable.    Plan:   Continue current medication therapy     Followup in 6 months  STABLE ON CURRENT MEDICATION.  TERA REVIEWED.  TOX SCREEN IS UP TO DATE.  THE CONTINUED USE OF A CONTROLLED SUBSTANCE IS NECESSARY FOR THIS PATIENT TO HAVE A NEAR NORMAL QUALITY OF LIFE AND WILL BE REVIEWED AT THE NEXT ROUTINE VISIT..       2. Essential hypertension  Assessment & Plan:  Hypertension is stable and controlled  Continue current treatment regimen.  Blood pressure will be reassessed in 6 months.      3. Type 2 diabetes mellitus treated without insulin  Assessment & Plan:  Diabetes is stable.   Continue current treatment regimen.  Diabetes will be reassessed in 6 months      4. Primary osteoarthritis of knees, bilateral  -     Ambulatory Referral to Orthopedic Surgery    5. Thinning hair  -     Ambulatory Referral to Dermatology    6. Venous insufficiency  Assessment & Plan:  ENCOURAGED TO ELEVATE LEGS WHEN POSSIBLE AND TO USE SUPPORT HOSE.        7. History of nicotine dependence  -     CT Chest Low Dose Wo; Future            Follow Up     Return in about 6 months (around 11/20/2024).    Patient was given instructions and counseling regarding her  condition or for health maintenance advice. Please see specific information pulled into the AVS if appropriate.

## 2024-05-20 NOTE — ASSESSMENT & PLAN NOTE
Patient's depression is a single episode that is moderate without psychosis. Depression is in partial remission and stable.    Plan:   Continue current medication therapy     Followup in 6 months  STABLE ON CURRENT MEDICATION.  TERA REVIEWED.  TOX SCREEN IS UP TO DATE.  THE CONTINUED USE OF A CONTROLLED SUBSTANCE IS NECESSARY FOR THIS PATIENT TO HAVE A NEAR NORMAL QUALITY OF LIFE AND WILL BE REVIEWED AT THE NEXT ROUTINE VISIT..

## 2024-06-24 ENCOUNTER — TELEPHONE (OUTPATIENT)
Dept: FAMILY MEDICINE CLINIC | Age: 74
End: 2024-06-24
Payer: MEDICARE

## 2024-06-25 PROBLEM — Z53.20 PROCEDURE DECLINED: Status: ACTIVE | Noted: 2024-06-25

## 2024-07-01 DIAGNOSIS — F34.1 DYSTHYMIC DISORDER: ICD-10-CM

## 2024-07-01 RX ORDER — CLONAZEPAM 2 MG/1
2 TABLET ORAL 3 TIMES DAILY PRN
Qty: 30 TABLET | Refills: 0 | Status: SHIPPED | OUTPATIENT
Start: 2024-07-01

## 2024-07-01 NOTE — TELEPHONE ENCOUNTER
NOV  11/21/24    LOV  5/20/24    LF 6/2/24  #90    POC Urine Drug Screen [facility] (10/23/2023 16:13)      On call provider

## 2024-07-01 NOTE — TELEPHONE ENCOUNTER
I have sent a partial refill on the medication.  She may prefer and wait for Dr. Hickey to send a full refill.  Thanks.

## 2024-07-14 DIAGNOSIS — E11.9 TYPE 2 DIABETES MELLITUS TREATED WITHOUT INSULIN: ICD-10-CM

## 2024-07-16 ENCOUNTER — TELEPHONE (OUTPATIENT)
Dept: FAMILY MEDICINE CLINIC | Age: 74
End: 2024-07-16
Payer: MEDICARE

## 2024-07-16 DIAGNOSIS — F34.1 DYSTHYMIC DISORDER: ICD-10-CM

## 2024-07-16 RX ORDER — GLIMEPIRIDE 2 MG/1
TABLET ORAL
Qty: 180 TABLET | Refills: 0 | Status: SHIPPED | OUTPATIENT
Start: 2024-07-16

## 2024-07-16 RX ORDER — LANCETS 28 GAUGE
EACH MISCELLANEOUS
Qty: 100 EACH | Refills: 1 | Status: SHIPPED | OUTPATIENT
Start: 2024-07-16 | End: 2024-07-16

## 2024-07-16 RX ORDER — LANCETS 28 GAUGE
EACH MISCELLANEOUS
Qty: 100 EACH | Refills: 1 | Status: SHIPPED | OUTPATIENT
Start: 2024-07-16

## 2024-07-16 RX ORDER — CLONAZEPAM 2 MG/1
2 TABLET ORAL 3 TIMES DAILY PRN
Qty: 90 TABLET | Refills: 2 | Status: SHIPPED | OUTPATIENT
Start: 2024-07-16

## 2024-07-16 NOTE — TELEPHONE ENCOUNTER
Please call her pharmacy about this. She does not use insulin but needs to test twice daily. She needs whatever meter and test strips her insurance will cover. We do not usually need to change our office notes that I can do so if necessary.

## 2024-07-16 NOTE — TELEPHONE ENCOUNTER
Rx Refill Note  Requested Prescriptions     Pending Prescriptions Disp Refills    clonazePAM (KlonoPIN) 2 MG tablet [Pharmacy Med Name: clonazePAM 2 MG Oral Tablet] 30 tablet 0     Sig: TAKE 1 TABLET BY MOUTH THREE TIMES DAILY AS NEEDED FOR ANXIETY      Last office visit with prescribing clinician: 5/20/2024  Last telemedicine visit with prescribing clinician: Visit date not found   Next office visit with prescribing clinician: 11/21/2024      Janessa Alejandra LPN  07/16/24, 13:53 EDT    -7/1/24 #30 BY ON CALL    POC Urine Drug Screen [facility] (10/23/2023 16:13)

## 2024-07-16 NOTE — TELEPHONE ENCOUNTER
Caller: ALFONZO LYON    Relationship: Other Relative    Best call back number: [phone]    Requested Prescriptions:   Requested Prescriptions      No prescriptions requested or ordered in this encounter    glimepiride (AMARYL) 2 MG tablet     Pharmacy where request should be sent: [facility] [address] [place] [address] [address] [facility] - [phone]  - [phone] FX     Last office visit with prescribing clinician: 5/20/2024   Last telemedicine visit with prescribing clinician: Visit date not found   Next office visit with prescribing clinician: 11/21/2024     Additional details provided by patient: PATIENT IS COMPLETELY OUT OF THIS MEDICATION. PLEASE SEND NEW PRESCRIPTION TO PHARMACY ASAP TODAY.    Does the patient have less than a 3 day supply:  [x] Yes  [] No    Would you like a call back once the refill request has been completed: [] Yes [] No    If the office needs to give you a call back, can they leave a voicemail: [] Yes [] No    Marcello Bolton   07/16/24 13:58 EDT

## 2024-07-16 NOTE — TELEPHONE ENCOUNTER
Patient states she would like to start checking her blood glucose. Needs order for supplies and meter. Would need office note from 5/20/24 addendum with note stating the testing frequency, diagnosis, if on insulin or not.     Please advise if ok

## 2024-07-22 ENCOUNTER — TELEPHONE (OUTPATIENT)
Dept: FAMILY MEDICINE CLINIC | Age: 74
End: 2024-07-22
Payer: MEDICARE

## 2024-07-22 DIAGNOSIS — I89.0 LYMPHEDEMA: ICD-10-CM

## 2024-07-22 DIAGNOSIS — I87.2 VENOUS INSUFFICIENCY: Primary | ICD-10-CM

## 2024-07-22 NOTE — TELEPHONE ENCOUNTER
"Caller: Annie Friend \"Pat\"    Relationship: Self    Best call back number: [phone]     What is the medical concern/diagnosis: LYMPHEDEMA- LEGS BOTH KNEES DOWN    What specialty or service is being requested: GENERAL SURGERY     What is the provider, practice or medical service name: DR. JEANETTE FRAZIER    What is the office location: [place]    What is the office phone number: [phone]  FAX: [phone]    Any additional details: APPOINTMENT AS SOON AS POSSIBLE.       MYCHART NO, CALL PREFERRED MAY LEAVE VOICEMAIL.  "

## 2024-07-23 NOTE — TELEPHONE ENCOUNTER
"  Caller: Annie Friend \"Pat\"    Relationship: Self    Best call back number: [phone]    What was the call regarding: PATIENT STATES RETURNED THE CALL BUT GOT A VOICEMAIL. PATIENT STATES TO PLEASE CALL HER BACK WHEN POSSIBLE.   "

## 2024-07-24 NOTE — TELEPHONE ENCOUNTER
Spoke with Dr Atkins's office, he does not specialize in Lymphedema, the pt also has not been diagnosed with this. Please adv

## 2024-07-30 DIAGNOSIS — I89.0 LYMPHEDEMA: Primary | ICD-10-CM

## 2024-08-06 ENCOUNTER — TELEPHONE (OUTPATIENT)
Dept: FAMILY MEDICINE CLINIC | Age: 74
End: 2024-08-06
Payer: MEDICARE

## 2024-08-06 DIAGNOSIS — I89.0 LYMPHEDEMA: Primary | ICD-10-CM

## 2024-08-06 NOTE — TELEPHONE ENCOUNTER
Nataly at [facility] requesting OT mary, orders. Patient has LYMPHEDEMA- LEGS BOTH KNEES DOWN. Order pended , please sign.

## 2024-08-11 DIAGNOSIS — F34.1 DYSTHYMIC DISORDER: ICD-10-CM

## 2024-08-12 RX ORDER — VENLAFAXINE HYDROCHLORIDE 150 MG/1
150 CAPSULE, EXTENDED RELEASE ORAL DAILY
Qty: 30 CAPSULE | Refills: 0 | Status: SHIPPED | OUTPATIENT
Start: 2024-08-12

## 2024-08-12 NOTE — TELEPHONE ENCOUNTER
Rx Refill Note  Requested Prescriptions     Pending Prescriptions Disp Refills    venlafaxine XR (EFFEXOR-XR) 150 MG 24 hr capsule [Pharmacy Med Name: Venlafaxine HCl  MG Oral Capsule Extended Release 24 Hour] 90 capsule 0     Sig: Take 1 capsule by mouth once daily      Last office visit with prescribing clinician: 5/20/2024   Last telemedicine visit with prescribing clinician: Visit date not found   Next office visit with prescribing clinician: 11/21/2024  Dr Hickey is out of the office until 08/21/24, sent to on call Dr Thornton.   SNRI Protocol Failed     Destiney Mims LPN  08/12/24, 12:14 EDT

## 2024-08-31 ENCOUNTER — DOCUMENTATION (OUTPATIENT)
Dept: FAMILY MEDICINE CLINIC | Age: 74
End: 2024-08-31
Payer: MEDICARE

## 2024-08-31 RX ORDER — SULFAMETHOXAZOLE/TRIMETHOPRIM 800-160 MG
1 TABLET ORAL 2 TIMES DAILY
Qty: 14 TABLET | Refills: 0 | Status: SHIPPED | OUTPATIENT
Start: 2024-08-31 | End: 2024-09-07

## 2024-08-31 RX ORDER — PHENAZOPYRIDINE HYDROCHLORIDE 200 MG/1
200 TABLET, FILM COATED ORAL 3 TIMES DAILY PRN
Qty: 9 TABLET | Refills: 0 | Status: SHIPPED | OUTPATIENT
Start: 2024-08-31

## 2024-08-31 NOTE — PROGRESS NOTES
Call from patient relating to dysria frequency urgency for the past 24 hours. Prescription for Bactrim DS and Pyridium sent to [facility]. She is instructed to contact us if symptoms have not improved for a few days.

## 2024-09-09 DIAGNOSIS — F34.1 DYSTHYMIC DISORDER: ICD-10-CM

## 2024-09-10 RX ORDER — VENLAFAXINE HYDROCHLORIDE 150 MG/1
150 CAPSULE, EXTENDED RELEASE ORAL DAILY
Qty: 60 CAPSULE | Refills: 1 | Status: SHIPPED | OUTPATIENT
Start: 2024-09-10

## 2024-09-10 NOTE — TELEPHONE ENCOUNTER
Rx Refill Note  Requested Prescriptions     Pending Prescriptions Disp Refills    venlafaxine XR (EFFEXOR-XR) 150 MG 24 hr capsule [Pharmacy Med Name: Venlafaxine HCl  MG Oral Capsule Extended Release 24 Hour] 30 capsule 0     Sig: Take 1 capsule by mouth once daily      Last office visit with prescribing clinician: 05/20/2024, Dr Hickey  Last telemedicine visit with prescribing clinician: Visit date not found   Next office visit with prescribing clinician: 11/21/2024, Dr Hickey  SNRI Protocol Failed     Destiney Mims LPN  09/10/24, 16:01 EDT

## 2024-09-17 RX ORDER — POTASSIUM CHLORIDE 1500 MG/1
TABLET, EXTENDED RELEASE ORAL
Qty: 360 TABLET | Refills: 0 | Status: SHIPPED | OUTPATIENT
Start: 2024-09-17

## 2024-10-10 DIAGNOSIS — E11.9 TYPE 2 DIABETES MELLITUS TREATED WITHOUT INSULIN: ICD-10-CM

## 2024-10-10 NOTE — TELEPHONE ENCOUNTER
Rx Refill Note  Requested Prescriptions     Pending Prescriptions Disp Refills    glimepiride (AMARYL) 2 MG tablet [Pharmacy Med Name: Glimepiride 2 MG Oral Tablet] 180 tablet 0     Sig: Take 1 tablet by mouth twice daily      Last office visit with prescribing clinician: 5/20/2024   Last telemedicine visit with prescribing clinician: Visit date not found   Next office visit with prescribing clinician: 11/21/2024  Antihyperglycemics Protocol Failed1     Destiney Mims LPN  10/10/24, 09:03 EDT

## 2024-10-11 DIAGNOSIS — F34.1 DYSTHYMIC DISORDER: ICD-10-CM

## 2024-10-11 RX ORDER — GLIMEPIRIDE 2 MG/1
TABLET ORAL
Qty: 180 TABLET | Refills: 1 | Status: SHIPPED | OUTPATIENT
Start: 2024-10-11

## 2024-10-11 NOTE — TELEPHONE ENCOUNTER
Rx Refill Note  Requested Prescriptions     Pending Prescriptions Disp Refills    clonazePAM (KlonoPIN) 2 MG tablet 90 tablet 2     Sig: Take 1 tablet by mouth 3 (Three) Times a Day As Needed for Anxiety. for anxiety      Last office visit with prescribing clinician: 5/20/2024   Last telemedicine visit with prescribing clinician: Visit date not found   Next office visit with prescribing clinician: 11/21/2024  Last refill sent: 07/16/24, #90, 2 refills  POC Urine Drug Screen [facility] (10/23/2023 16:13)     Destiney Mims LPN  10/11/24, 12:29 EDT

## 2024-10-13 RX ORDER — CLONAZEPAM 2 MG/1
2 TABLET ORAL 3 TIMES DAILY PRN
Qty: 90 TABLET | Refills: 2 | Status: SHIPPED | OUTPATIENT
Start: 2024-10-13

## 2024-10-23 DIAGNOSIS — F34.1 DYSTHYMIC DISORDER: ICD-10-CM

## 2024-10-23 DIAGNOSIS — E11.9 TYPE 2 DIABETES MELLITUS TREATED WITHOUT INSULIN: ICD-10-CM

## 2024-10-23 NOTE — TELEPHONE ENCOUNTER
Caller: PAUL LSETER    Relationship: Other    Best call back number: [phone]    Requested Prescriptions:   Requested Prescriptions     Pending Prescriptions Disp Refills    phenazopyridine (Pyridium) 200 MG tablet 9 tablet 0     Sig: Take 1 tablet by mouth 3 (Three) Times a Day As Needed for Bladder Spasms.    venlafaxine XR (EFFEXOR-XR) 150 MG 24 hr capsule 60 capsule 1     Sig: Take 1 capsule by mouth Daily.    clonazePAM (KlonoPIN) 2 MG tablet 90 tablet 2     Sig: Take 1 tablet by mouth 3 (Three) Times a Day As Needed for Anxiety. for anxiety    glimepiride (AMARYL) 2 MG tablet 180 tablet 1     Sig: Take 1 tablet by mouth 2 (Two) Times a Day.      SULSAMETHOXAZOLE      Pharmacy where request should be sent:      [facility]  [place]      [phone]  PHONE   [phone]  FAX        Last office visit with prescribing clinician: 5/20/2024   Last telemedicine visit with prescribing clinician: Visit date not found   Next office visit with prescribing clinician: 11/21/2024     Additional details provided by patient:       ADRIEL FROM [facility] CALLED ON BEHALF OF THE PATIENT     Would you like a call back once the refill request has been completed: [] Yes [x] No    If the office needs to give you a call back, can they leave a voicemail: [] Yes [x] No    Marcello Car Rep   10/23/24 15:28 EDT    
I have seen and examined this patient and fully participated in the care of this patient as the teaching attending.  The service was shared with the LISA.  I reviewed and verified the documentation and independently performed the documented:

## 2024-10-24 RX ORDER — VENLAFAXINE HYDROCHLORIDE 150 MG/1
150 CAPSULE, EXTENDED RELEASE ORAL DAILY
Qty: 60 CAPSULE | Refills: 1 | OUTPATIENT
Start: 2024-10-24

## 2024-10-24 RX ORDER — PHENAZOPYRIDINE HYDROCHLORIDE 200 MG/1
200 TABLET, FILM COATED ORAL 3 TIMES DAILY PRN
Qty: 9 TABLET | Refills: 0 | OUTPATIENT
Start: 2024-10-24

## 2024-10-24 RX ORDER — GLIMEPIRIDE 2 MG/1
2 TABLET ORAL 2 TIMES DAILY
Qty: 180 TABLET | Refills: 1 | OUTPATIENT
Start: 2024-10-24

## 2024-10-24 RX ORDER — CLONAZEPAM 2 MG/1
2 TABLET ORAL 3 TIMES DAILY PRN
Qty: 90 TABLET | Refills: 2 | OUTPATIENT
Start: 2024-10-24

## 2024-10-29 ENCOUNTER — TELEPHONE (OUTPATIENT)
Dept: FAMILY MEDICINE CLINIC | Age: 74
End: 2024-10-29
Payer: MEDICARE

## 2024-10-29 DIAGNOSIS — E11.9 TYPE 2 DIABETES MELLITUS TREATED WITHOUT INSULIN: ICD-10-CM

## 2024-10-29 DIAGNOSIS — F34.1 DYSTHYMIC DISORDER: ICD-10-CM

## 2024-10-29 NOTE — TELEPHONE ENCOUNTER
[facility] sent a fax for oxygen renewal.  Need liter flow, frequency of use and method of delivery.   Tried to contact pt, no answer, vm full.

## 2024-10-29 NOTE — TELEPHONE ENCOUNTER
[facility] called on behalf of Annie Friend requesting all of her prescriptions be sent to them.

## 2024-10-30 RX ORDER — GLIMEPIRIDE 2 MG/1
2 TABLET ORAL 2 TIMES DAILY
Qty: 180 TABLET | Refills: 0 | Status: CANCELLED | OUTPATIENT
Start: 2024-10-30

## 2024-10-30 RX ORDER — POTASSIUM CHLORIDE 1500 MG/1
40 TABLET, EXTENDED RELEASE ORAL 2 TIMES DAILY
Qty: 360 TABLET | Refills: 0 | Status: CANCELLED | OUTPATIENT
Start: 2024-10-30

## 2024-10-30 RX ORDER — CLONAZEPAM 2 MG/1
2 TABLET ORAL 3 TIMES DAILY PRN
Qty: 90 TABLET | Refills: 2 | Status: CANCELLED | OUTPATIENT
Start: 2024-10-30

## 2024-10-30 NOTE — TELEPHONE ENCOUNTER
Pt said she uses her oxygen every night at 2 1/2 liters per min per nasal cannula.   Form filled out and placed in providers office for signature.

## 2024-10-30 NOTE — TELEPHONE ENCOUNTER
Pt confirmed she is changing to [facility] after the January 1st 2025.  She is changing her insurance to [facility].  Ask her to bring in her new card to her next appt so we will have it on file.   She is [facility]     Rx Refill Note  Requested Prescriptions     Pending Prescriptions Disp Refills    venlafaxine XR (EFFEXOR-XR) 150 MG 24 hr capsule 90 capsule 0     Sig: Take 1 capsule by mouth Daily.      Last office visit with prescribing clinician: 5/20/2024   Last telemedicine visit with prescribing clinician: Visit date not found   Next office visit with prescribing clinician: 10/29/2024      Destiney Mims LPN  10/30/24, 12:54 EDT

## 2024-11-02 RX ORDER — VENLAFAXINE HYDROCHLORIDE 150 MG/1
150 CAPSULE, EXTENDED RELEASE ORAL DAILY
Qty: 90 CAPSULE | Refills: 0 | Status: SHIPPED | OUTPATIENT
Start: 2024-11-02

## 2024-11-05 ENCOUNTER — TELEPHONE (OUTPATIENT)
Dept: FAMILY MEDICINE CLINIC | Age: 74
End: 2024-11-05
Payer: MEDICARE

## 2024-11-05 NOTE — TELEPHONE ENCOUNTER
Dr Hickey is out of the office until 11/13/24.  Pt informed she would need to make an appt with one of our acute care providers.  Appt offered, she declined at this time.   Increase fluid intake, void frequently and do not hold her urine.  Urgent Care or ER if worsen.

## 2024-11-05 NOTE — TELEPHONE ENCOUNTER
"Caller: Annie Friend \"Pat\"    Relationship: Self    Best call back number: [phone]     What medication are you requesting: BACTRIM OR SOMETHING TO TREAT UTI     What are your current symptoms: PAINFUL AND FREQUENT URINATION     How long have you been experiencing symptoms: 2 DAYS    If a prescription is needed, what is your preferred pharmacy and phone number: [address] [facility] [phone] [facility] [phone] FX     "

## 2024-11-11 ENCOUNTER — TELEPHONE (OUTPATIENT)
Dept: FAMILY MEDICINE CLINIC | Age: 74
End: 2024-11-11
Payer: MEDICARE

## 2024-11-11 NOTE — TELEPHONE ENCOUNTER
"    Caller: Annie Friend \"Pat\"    Relationship: Self    Best call back number: [phone]    What medication are you requesting: ANTIBIOTIC     What are your current symptoms: BURNING/FREQUENT URINATION     How long have you been experiencing symptoms: OVER A WEEK     Have you had these symptoms before:    [x] Yes  [] No    Have you been treated for these symptoms before:   [x] Yes  [] No    If a prescription is needed, what is your preferred pharmacy and phone number: [facility] [address] [place], [place] [address] [address] [facility] [phone] [facility] [phone] FX     Additional notes: PATIENT STATES PROVIDER NORMALLY CALLS IN BACTRUM FOR HER.         "

## 2024-11-11 NOTE — TELEPHONE ENCOUNTER
Informed pt that Dr Hickey is out of the office until November 13 afternoon.  Offered an appt with acute care provider and she declined.  Advised her to go to urgent care or ER if she were to worsen.

## 2024-11-13 ENCOUNTER — TELEPHONE (OUTPATIENT)
Dept: FAMILY MEDICINE CLINIC | Age: 74
End: 2024-11-13
Payer: MEDICARE

## 2024-11-13 DIAGNOSIS — R35.0 FREQUENT URINATION: ICD-10-CM

## 2024-11-13 DIAGNOSIS — R30.0 DYSURIA: Primary | ICD-10-CM

## 2024-11-13 NOTE — TELEPHONE ENCOUNTER
Pt c/o burning and frequency with urination for 2 weeks.  No fever, occasional chills.  Drinking cranberry juice and its helped a little.

## 2024-11-14 ENCOUNTER — TELEPHONE (OUTPATIENT)
Dept: FAMILY MEDICINE CLINIC | Age: 74
End: 2024-11-14
Payer: MEDICARE

## 2024-11-14 NOTE — TELEPHONE ENCOUNTER
Pt declined appt, she said put in a order in for a urine test and she will have someone bring it in.  Said she does have an appt to see Dr Hickey on 11/21/24.

## 2024-11-14 NOTE — TELEPHONE ENCOUNTER
A user error has taken place: encounter opened in error, closed for administrative reasons.    
knee pain/injury

## 2024-11-15 ENCOUNTER — LAB (OUTPATIENT)
Dept: LAB | Facility: HOSPITAL | Age: 74
End: 2024-11-15
Payer: MEDICARE

## 2024-11-15 DIAGNOSIS — R30.0 DYSURIA: ICD-10-CM

## 2024-11-15 DIAGNOSIS — R35.0 FREQUENT URINATION: ICD-10-CM

## 2024-11-15 LAB
BILIRUB UR QL STRIP: NEGATIVE
CLARITY UR: CLEAR
COLOR UR: YELLOW
GLUCOSE UR STRIP-MCNC: NEGATIVE MG/DL
HGB UR QL STRIP.AUTO: NEGATIVE
KETONES UR QL STRIP: NEGATIVE
LEUKOCYTE ESTERASE UR QL STRIP.AUTO: NEGATIVE
NITRITE UR QL STRIP: NEGATIVE
PH UR STRIP.AUTO: 5.5 [PH] (ref 5–8)
PROT UR QL STRIP: NEGATIVE
SP GR UR STRIP: 1.02 (ref 1–1.03)
UROBILINOGEN UR QL STRIP: NORMAL

## 2024-11-15 PROCEDURE — 81003 URINALYSIS AUTO W/O SCOPE: CPT

## 2024-11-15 NOTE — TELEPHONE ENCOUNTER
Urinalysis With Culture If Indicated - Urine, Clean Catch (11/15/2024 09:40)   Dr Hickey said to inform pt that her urine test was negative.  If she continues to have symptoms she would need to come in to be seen for an appt.

## 2025-01-16 DIAGNOSIS — F34.1 DYSTHYMIC DISORDER: ICD-10-CM

## 2025-01-16 NOTE — TELEPHONE ENCOUNTER
LV:5/20/24  LF:10/13/24 #90 w/2RF  Tox:10/23/23    Pt has canceled last 5 appointments, is scheduled for 2/3/25

## 2025-01-16 NOTE — TELEPHONE ENCOUNTER
"Caller: Annie Friend \"Pat\"    Relationship: Self    Best call back number: [phone]     Requested Prescriptions:   Requested Prescriptions     Pending Prescriptions Disp Refills    clonazePAM (KlonoPIN) 2 MG tablet 90 tablet 2     Sig: Take 1 tablet by mouth 3 (Three) Times a Day As Needed for Anxiety. for anxiety        Pharmacy where request should be sent: [place] [id number] [facility] [phone] [facility] [phone]      Last office visit with prescribing clinician: 5/20/2024   Last telemedicine visit with prescribing clinician: Visit date not found   Next office visit with prescribing clinician: 2/3/2025     Additional details provided by patient: PATIENT WILL BE OUT OF MEDICATION AFTER TODAY    Does the patient have less than a 3 day supply:  [x] Yes  [] No        Marcello Lopez Rep   01/16/25 11:21 EST         "

## 2025-01-17 DIAGNOSIS — F34.1 DYSTHYMIC DISORDER: ICD-10-CM

## 2025-01-17 RX ORDER — CLONAZEPAM 2 MG/1
2 TABLET ORAL 3 TIMES DAILY PRN
Qty: 90 TABLET | Refills: 0 | OUTPATIENT
Start: 2025-01-17

## 2025-01-17 RX ORDER — CLONAZEPAM 2 MG/1
2 TABLET ORAL 3 TIMES DAILY PRN
Qty: 90 TABLET | Refills: 0 | Status: SHIPPED | OUTPATIENT
Start: 2025-01-17

## 2025-02-19 DIAGNOSIS — F34.1 DYSTHYMIC DISORDER: ICD-10-CM

## 2025-02-19 NOTE — TELEPHONE ENCOUNTER
Rx Refill Note  Requested Prescriptions     Pending Prescriptions Disp Refills    clonazePAM (KlonoPIN) 2 MG tablet [Pharmacy Med Name: clonazePAM 2 MG Oral Tablet] 90 tablet 0     Sig: TAKE 1 TABLET BY MOUTH THREE TIMES DAILY AS NEEDED FOR ANXIETY      Last office visit with prescribing clinician: 5/20/2024   Last telemedicine visit with prescribing clinician: Visit date not found   Next office visit with prescribing clinician: 3/3/2025  Last refill sent: 01/17/25, #90  POC Urine Drug Screen [facility] (10/23/2023 16:13)     Destiney Mims LPN  02/19/25, 16:32 EST

## 2025-02-20 RX ORDER — CLONAZEPAM 2 MG/1
2 TABLET ORAL 3 TIMES DAILY PRN
Qty: 90 TABLET | Refills: 0 | Status: SHIPPED | OUTPATIENT
Start: 2025-02-20

## 2025-02-24 NOTE — TELEPHONE ENCOUNTER
Rx Refill Note  Requested Prescriptions     Pending Prescriptions Disp Refills    potassium chloride ER (K-TAB) 20 MEQ tablet controlled-release ER tablet [Pharmacy Med Name: Potassium Chloride ER 20 MEQ Oral Tablet Extended Release] 360 tablet 0     Sig: Take 2 tablets by mouth twice daily      Last office visit with prescribing clinician: 5/20/2024   Last telemedicine visit with prescribing clinician: Visit date not found   Next office visit with prescribing clinician: 3/3/2025  Potassium Supplement Protocol Failed     Destiney Mims LPN  02/24/25, 10:22 EST

## 2025-02-25 RX ORDER — POTASSIUM CHLORIDE 1500 MG/1
TABLET, EXTENDED RELEASE ORAL
Qty: 360 TABLET | Refills: 0 | Status: SHIPPED | OUTPATIENT
Start: 2025-02-25

## 2025-03-06 NOTE — TELEPHONE ENCOUNTER
A user error has taken place: encounter opened in error, closed for administrative reasons.    The wire was removed from the right femoral vein.

## 2025-03-21 DIAGNOSIS — F34.1 DYSTHYMIC DISORDER: ICD-10-CM

## 2025-03-24 RX ORDER — CLONAZEPAM 2 MG/1
2 TABLET ORAL 3 TIMES DAILY PRN
Qty: 90 TABLET | Refills: 0 | Status: SHIPPED | OUTPATIENT
Start: 2025-03-24

## 2025-03-24 NOTE — TELEPHONE ENCOUNTER
Controlled refill request:  Requested Prescriptions     Pending Prescriptions Disp Refills    clonazePAM (KlonoPIN) 2 MG tablet [Pharmacy Med Name: CLONAZEPAM 2MG      TAB] 90 tablet 0     Sig: TAKE 1 TABLET BY MOUTH THREE TIMES DAILY AS NEEDED FOR ANXIETY      Last OV:  5/20/2024  Next OV:  4/11/2025  Last fill:  02/20/25, #90  Last tox:  10/23/2023    Dr Hickey is out of the office until March 31, 2025.  Sent to on call provider Dr Nunez.

## 2025-04-11 ENCOUNTER — OFFICE VISIT (OUTPATIENT)
Dept: FAMILY MEDICINE CLINIC | Age: 75
End: 2025-04-11
Payer: MEDICARE

## 2025-04-11 VITALS
HEIGHT: 66 IN | TEMPERATURE: 97.8 F | BODY MASS INDEX: 34.72 KG/M2 | OXYGEN SATURATION: 90 % | DIASTOLIC BLOOD PRESSURE: 72 MMHG | HEART RATE: 77 BPM | WEIGHT: 216 LBS | SYSTOLIC BLOOD PRESSURE: 107 MMHG

## 2025-04-11 DIAGNOSIS — F34.1 DYSTHYMIC DISORDER: ICD-10-CM

## 2025-04-11 DIAGNOSIS — I10 ESSENTIAL HYPERTENSION: ICD-10-CM

## 2025-04-11 DIAGNOSIS — E11.9 TYPE 2 DIABETES MELLITUS TREATED WITHOUT INSULIN: ICD-10-CM

## 2025-04-11 DIAGNOSIS — Z00.00 MEDICARE ANNUAL WELLNESS VISIT, SUBSEQUENT: Primary | ICD-10-CM

## 2025-04-11 PROBLEM — B35.1 ONYCHOMYCOSIS: Status: RESOLVED | Noted: 2023-10-23 | Resolved: 2025-04-11

## 2025-04-11 PROBLEM — L65.9 THINNING HAIR: Status: RESOLVED | Noted: 2024-05-20 | Resolved: 2025-04-11

## 2025-04-11 RX ORDER — VENLAFAXINE HYDROCHLORIDE 150 MG/1
150 CAPSULE, EXTENDED RELEASE ORAL DAILY
Qty: 90 CAPSULE | Refills: 0 | OUTPATIENT
Start: 2025-04-11

## 2025-04-11 RX ORDER — GLIMEPIRIDE 2 MG/1
2 TABLET ORAL 2 TIMES DAILY
Qty: 180 TABLET | Refills: 0 | OUTPATIENT
Start: 2025-04-11

## 2025-04-11 NOTE — PROGRESS NOTES
Subjective   The ABCs of the Annual Wellness Visit  Medicare Wellness Visit      Annie Friend is a 75 y.o. patient who presents for a Medicare Wellness Visit.    The following portions of the patient's history were reviewed and   updated as appropriate: allergies, current medications, past family history, past medical history, past social history, past surgical history, and problem list.    Compared to one year ago, the patient's physical   health is the same.  Compared to one year ago, the patient's mental   health is the same.    Recent Hospitalizations:  She was not admitted to the hospital during the last year.     Current Medical Providers:  Patient Care Team:  Aroldo Hickey MD as PCP - General (Family Medicine)  Filiberto Atkins MD as Consulting Physician (General Surgery)    Outpatient Medications Prior to Visit   Medication Sig Dispense Refill    apixaban (ELIQUIS) 5 MG tablet tablet Take 1 tablet by mouth 2 (Two) Times a Day.      Blood Glucose Monitoring Suppl device Use 1 each 2 (Two) Times a Day. which ever one insurance will cover 1 each 0    bumetanide (BUMEX) 2 MG tablet Take 2 tablets by mouth Daily.      clonazePAM (KlonoPIN) 2 MG tablet TAKE 1 TABLET BY MOUTH THREE TIMES DAILY AS NEEDED FOR ANXIETY 90 tablet 0    clotrimazole-betamethasone (Lotrisone) 1-0.05 % cream Apply 1 application topically to the appropriate area as directed 2 (Two) Times a Day. Apply to affected area bid 30 g 0    digoxin (LANOXIN) 125 MCG tablet Take 1 tablet by mouth once daily (Patient taking differently: Take 1 tablet by mouth Every Other Day.) 90 tablet 0    docusate sodium 100 MG capsule Take 1 capsule by mouth 2 (two) times a day.      glimepiride (AMARYL) 2 MG tablet Take 1 tablet by mouth twice daily 180 tablet 1    glucose blood test strip which ever one insurance will cover  Test blood sugar BID for E11.8 100 each 1    ketoconazole (NIZORAL) 2 % cream Apply 1 application topically to the  appropriate area as directed Daily. 30 g 0    Lancets (freestyle) lancets Test twice a day E11.8 100 each 1    Metoprolol Tartrate 75 MG tablet Take 1 tablet by mouth Every 12 (Twelve) Hours.      O2 (OXYGEN) Inhale 2 L/min Every Night.      ondansetron ODT (Zofran ODT) 4 MG disintegrating tablet Place 1 tablet on the tongue Every 8 (Eight) Hours As Needed for Nausea or Vomiting. 12 tablet 1    phenazopyridine (Pyridium) 200 MG tablet Take 1 tablet by mouth 3 (Three) Times a Day As Needed for Bladder Spasms. 9 tablet 0    potassium chloride ER (K-TAB) 20 MEQ tablet controlled-release ER tablet Take 2 tablets by mouth twice daily 360 tablet 0    venlafaxine XR (EFFEXOR-XR) 150 MG 24 hr capsule Take 1 capsule by mouth Daily. 90 capsule 0     No facility-administered medications prior to visit.     No opioid medication identified on active medication list. I have reviewed chart for other potential  high risk medication/s and harmful drug interactions in the elderly.      Aspirin is not on active medication list.  Aspirin use is not indicated based on review of current medical condition/s. Risk of harm outweighs potential benefits.  .    Patient Active Problem List   Diagnosis    Primary osteoarthritis of knees, bilateral    Chronic atrial fibrillation    Essential hypertension    Diastolic heart failure    Type 2 diabetes mellitus treated without insulin    Dysthymic disorder    Rectal prolapse    Colonoscopy declined (Cologfedericord as well)     Medicare annual wellness visit, subsequent    Cardiac pacemaker in situ    Venous insufficiency    Screening chest CT declined     Advance Care Planning Advance Directive is not on file.  ACP discussion was held with the patient during this visit. Patient has an advance directive (not in EMR), copy requested.            Objective   Vitals:    04/11/25 1552   BP: 107/72   BP Location: Right arm   Patient Position: Sitting   Pulse: 77   Temp: 97.8 °F (36.6 °C)   TempSrc: Oral   SpO2:  "90%  Comment: on RA   Weight: 98 kg (216 lb)   Height: 166.4 cm (65.5\")   PainSc: 0-No pain       Estimated body mass index is 35.4 kg/m² as calculated from the following:    Height as of this encounter: 166.4 cm (65.5\").    Weight as of this encounter: 98 kg (216 lb).    Class 2 Severe Obesity (BMI >=35 and <=39.9). Obesity-related health conditions include the following: hypertension. Obesity is unchanged. BMI is is above average; BMI management plan is completed. We discussed portion control and increasing exercise.           Does the patient have evidence of cognitive impairment? No                                                                                                Health  Risk Assessment    Smoking Status:  Social History     Tobacco Use   Smoking Status Former    Current packs/day: 0.00    Average packs/day: 0.5 packs/day for 50.0 years (25.0 ttl pk-yrs)    Types: Cigarettes    Start date: 1968    Quit date: 2018    Years since quittin.9   Smokeless Tobacco Never     Alcohol Consumption:  Social History     Substance and Sexual Activity   Alcohol Use Yes    Comment: 2-3/month       Fall Risk Screen  STEADI Fall Risk Assessment was completed, and patient is at MODERATE risk for falls. Assessment completed on:2025    Depression Screening   Little interest or pleasure in doing things? Not at all   Feeling down, depressed, or hopeless? Several days   PHQ-2 Total Score 1      Health Habits and Functional and Cognitive Screenin/11/2025     3:49 PM   Functional & Cognitive Status   Do you have difficulty preparing food and eating? No   Do you have difficulty bathing yourself, getting dressed or grooming yourself? No   Do you have difficulty using the toilet? No   Do you have difficulty moving around from place to place? No   Do you have trouble with steps or getting out of a bed or a chair? No   Current Diet Well Balanced Diet   Dental Exam Not up to date   Eye Exam Not up to " date   Exercise (times per week) 0 times per week   Current Exercises Include No Regular Exercise;Other   Do you need help using the phone?  No   Are you deaf or do you have serious difficulty hearing?  No   Do you need help to go to places out of walking distance? Yes   Do you need help shopping? Yes   Do you need help preparing meals?  No   Do you need help with housework?  Yes   Do you need help with laundry? Yes   Do you need help taking your medications? No   Do you need help managing money? No   Do you ever drive or ride in a car without wearing a seat belt? No   Have you felt unusual stress, anger or loneliness in the last month? Yes   Who do you live with? Other   If you need help, do you have trouble finding someone available to you? No   Have you been bothered in the last four weeks by sexual problems? No   Do you have difficulty concentrating, remembering or making decisions? No           Age-appropriate Screening Schedule:  Refer to the list below for future screening recommendations based on patient's age, sex and/or medical conditions. Orders for these recommended tests are listed in the plan section. The patient has been provided with a written plan.    Health Maintenance List  Health Maintenance   Topic Date Due    DXA SCAN  Never done    URINE MICROALBUMIN-CREATININE RATIO (uACR)  Never done    DIABETIC EYE EXAM  06/28/2024    HEMOGLOBIN A1C  06/30/2024    COVID-19 Vaccine (3 - 2024-25 season) 09/01/2024    RSV Vaccine - Adults (1 - 1-dose 75+ series) 05/20/2025 (Originally 3/23/2023)    TDAP/TD VACCINES (1 - Tdap) 05/20/2025 (Originally 3/23/1969)    ZOSTER VACCINE (1 of 2) 05/20/2025 (Originally 3/23/1998)    LUNG CANCER SCREENING  04/11/2026 (Originally 3/23/1998)    INFLUENZA VACCINE  07/01/2025    ANNUAL WELLNESS VISIT  04/11/2026    HEPATITIS C SCREENING  Completed    Pneumococcal Vaccine 50+  Completed                                                                                              "                                                   CMS Preventative Services Quick Reference  Risk Factors Identified During Encounter  None Identified    The above risks/problems have been discussed with the patient.  Pertinent information has been shared with the patient in the After Visit Summary.  An After Visit Summary and PPPS were made available to the patient.    Follow Up:   Next Medicare Wellness visit to be scheduled in 1 year.         Additional E&M Note during same encounter follows:  Patient has additional, significant, and separately identifiable condition(s)/problem(s) that require work above and beyond the Medicare Wellness Visit     Chief Complaint  Medicare Wellness-subsequent    Subjective   --TOLERATING BLOOD PRESSURE MEDICATION WITHOUT APPARENT SIDE EFFECTS  --LAST HGA1C WAS 6.6 % WITH CURRENT MEDS  --CONTINUES ON AN SNRI AND ROUTINE KLONOPIN FOR CHRINIC ANXIETY / DEPRESSION, STABLE          Review of Systems   Constitutional:  Negative for activity change, appetite change, chills, fatigue and fever.   HENT:  Negative for congestion, ear pain, hearing loss, rhinorrhea and sore throat.    Eyes:  Negative for discharge and visual disturbance.   Respiratory:  Negative for cough and shortness of breath.    Cardiovascular:  Negative for chest pain, palpitations and leg swelling.   Gastrointestinal:  Negative for abdominal pain, diarrhea, nausea and vomiting.   Genitourinary:  Negative for dysuria and hematuria.   Musculoskeletal:  Positive for arthralgias. Negative for myalgias.   Psychiatric/Behavioral:  Negative for dysphoric mood.               Objective   Vital Signs:  /72 (BP Location: Right arm, Patient Position: Sitting)   Pulse 77   Temp 97.8 °F (36.6 °C) (Oral)   Ht 166.4 cm (65.5\")   Wt 98 kg (216 lb)   SpO2 90% Comment: on RA  BMI 35.40 kg/m²   Physical Exam  Vitals and nursing note reviewed.   Constitutional:       General: She is not in acute distress.     Appearance: Normal " appearance.   HENT:      Right Ear: Tympanic membrane normal.      Left Ear: Tympanic membrane normal.      Mouth/Throat:      Pharynx: Oropharynx is clear.   Eyes:      Conjunctiva/sclera: Conjunctivae normal.   Cardiovascular:      Rate and Rhythm: Normal rate and regular rhythm.      Heart sounds: Normal heart sounds. No murmur heard.  Pulmonary:      Effort: Pulmonary effort is normal.      Breath sounds: Normal breath sounds.   Abdominal:      General: Bowel sounds are normal.      Palpations: Abdomen is soft.      Tenderness: There is no abdominal tenderness.   Musculoskeletal:      Cervical back: Neck supple.      Right lower leg: No edema.      Left lower leg: No edema.   Lymphadenopathy:      Cervical: No cervical adenopathy.   Neurological:      General: No focal deficit present.      Mental Status: She is alert.      Cranial Nerves: No cranial nerve deficit.      Coordination: Coordination normal.      Gait: Gait normal.   Psychiatric:         Mood and Affect: Mood normal.         Behavior: Behavior normal.                    Assessment and Plan      Dysthymic disorder           Essential hypertension  Hypertension is stable and controlled  Continue current treatment regimen.  Blood pressure will be reassessed in 6 months.         Medicare annual wellness visit, subsequent  ADVICE GIVEN RE:  SEATBELT USE, ALCOHOL USE, HEALTHY DIET, ROUTINE EYE AND DENTAL EXAM, ROUTINE VACCINATIONS.    DECLINES A COLONOSCOPY, WILL OBTAIN LABS AT NEXT VISIT          Type 2 diabetes mellitus treated without insulin  Diabetes is stable.   Continue current treatment regimen.  Diabetes will be reassessed in 6 months                 Follow Up   Return in about 6 months (around 10/11/2025).  Patient was given instructions and counseling regarding her condition or for health maintenance advice. Please see specific information pulled into the AVS if appropriate.

## 2025-04-11 NOTE — TELEPHONE ENCOUNTER
Rx Refill Note  Requested Prescriptions     Pending Prescriptions Disp Refills    glimepiride (AMARYL) 2 MG tablet [Pharmacy Med Name: Glimepiride 2 MG Oral Tablet] 180 tablet 0     Sig: Take 1 tablet by mouth twice daily    venlafaxine XR (EFFEXOR-XR) 150 MG 24 hr capsule [Pharmacy Med Name: Venlafaxine HCl  MG Oral Capsule Extended Release 24 Hour] 90 capsule 0     Sig: Take 1 capsule by mouth once daily      Last office visit with prescribing clinician: 5/20/2024     Next office visit with prescribing clinician: 4/11/2025       Crystal Gutierrez LPN  04/11/25, 10:00 EDT    Appt 4/11/25   same name as above

## 2025-04-11 NOTE — ASSESSMENT & PLAN NOTE
ADVICE GIVEN RE:  SEATBELT USE, ALCOHOL USE, HEALTHY DIET, ROUTINE EYE AND DENTAL EXAM, ROUTINE VACCINATIONS.    DECLINES A COLONOSCOPY, WILL OBTAIN LABS AT NEXT VISIT

## 2025-04-21 DIAGNOSIS — F34.1 DYSTHYMIC DISORDER: ICD-10-CM

## 2025-04-21 RX ORDER — CLONAZEPAM 2 MG/1
2 TABLET ORAL 3 TIMES DAILY PRN
Qty: 90 TABLET | Refills: 0 | Status: CANCELLED | OUTPATIENT
Start: 2025-04-21

## 2025-04-21 NOTE — TELEPHONE ENCOUNTER
Controlled refill request:  Requested Prescriptions     Pending Prescriptions Disp Refills    clonazePAM (KlonoPIN) 2 MG tablet [Pharmacy Med Name: CLONAZEPAM 2MG      TAB] 90 tablet 0     Sig: TAKE 1 TABLET BY MOUTH THREE TIMES DAILY AS NEEDED FOR ANXIETY      Last OV:  4/11/2025  Next OV:  4/21/2025  Last fill:  03/24/25, #90  Last tox:  10/23/2023

## 2025-04-21 NOTE — TELEPHONE ENCOUNTER
"Caller: Annie Friend \"Pat\"    Relationship: Self    Best call back number: [phone]     Requested Prescriptions:   Requested Prescriptions     Pending Prescriptions Disp Refills    clonazePAM (KlonoPIN) 2 MG tablet 90 tablet 0     Sig: Take 1 tablet by mouth 3 (Three) Times a Day As Needed. for anxiety        Pharmacy where request should be sent: [place] [address] [address] [facility] [phone] [facility] [phone]      Last office visit with prescribing clinician: 4/11/2025   Last telemedicine visit with prescribing clinician: Visit date not found   Next office visit with prescribing clinician: 10/13/2025     Does the patient have less than a 3 day supply:  [x] Yes  [] No    Would you like a call back once the refill request has been completed: [] Yes [x] No    If the office needs to give you a call back, can they leave a voicemail: [] Yes [x] No    Marcello Morelos Rep   04/21/25 14:58 EDT         "

## 2025-04-22 RX ORDER — CLONAZEPAM 2 MG/1
2 TABLET ORAL 3 TIMES DAILY PRN
Qty: 90 TABLET | Refills: 2 | Status: SHIPPED | OUTPATIENT
Start: 2025-04-22

## 2025-05-01 DIAGNOSIS — F34.1 DYSTHYMIC DISORDER: ICD-10-CM

## 2025-05-01 RX ORDER — VENLAFAXINE HYDROCHLORIDE 150 MG/1
150 CAPSULE, EXTENDED RELEASE ORAL DAILY
Qty: 90 CAPSULE | Refills: 1 | Status: SHIPPED | OUTPATIENT
Start: 2025-05-01

## 2025-05-21 ENCOUNTER — TELEPHONE (OUTPATIENT)
Dept: FAMILY MEDICINE CLINIC | Age: 75
End: 2025-05-21
Payer: MEDICARE

## 2025-05-21 DIAGNOSIS — E11.9 TYPE 2 DIABETES MELLITUS TREATED WITHOUT INSULIN: ICD-10-CM

## 2025-05-21 RX ORDER — GLIMEPIRIDE 2 MG/1
2 TABLET ORAL 2 TIMES DAILY
Qty: 180 TABLET | Refills: 0 | Status: SHIPPED | OUTPATIENT
Start: 2025-05-21

## 2025-05-21 NOTE — TELEPHONE ENCOUNTER
Tried to call pt to let her know we do not have any paperwork from her insurance company,  no answer or vm.

## 2025-05-21 NOTE — TELEPHONE ENCOUNTER
Rx Refill Note  Requested Prescriptions     Pending Prescriptions Disp Refills    glimepiride (AMARYL) 2 MG tablet [Pharmacy Med Name: Glimepiride 2 MG Oral Tablet] 180 tablet 0     Sig: Take 1 tablet by mouth twice daily      Last office visit with prescribing clinician: 4/11/2025   Last telemedicine visit with prescribing clinician: Visit date not found   Next office visit with prescribing clinician: 10/13/2025  Antihyperglycemics Protocol Failed     Destiney Mims LPN  05/21/25, 08:41 EDT

## 2025-05-21 NOTE — TELEPHONE ENCOUNTER
"  Caller: Annie Friend \"Pat\"    Relationship: Self    Best call back number: [phone]    What form or medical record are you requesting: [facility]    Who is requesting this form or medical record from you: [employer]     How would you like to receive the form or medical records (pick-up, mail, fax):   If fax, what is the fax number: INCLUDED ON FORM    Timeframe paperwork needed: AS SOON AS POSSIBLE    Additional notes: [employer] INSURANCE STARTED 04/01/2025 AND PATIENT RECEIVED LETTER STATING UNTIL THE VERIFICATION OF CHRONIC CONDITION IS RECEIVED THEY CANNOT GO FURTHER   THEY SENT THE FORM BUT IT HAS NOT BEEN RETURNED   PLEASE CONTACT IF THERE ARE ANY QUESTIONS           "

## 2025-06-03 ENCOUNTER — TELEPHONE (OUTPATIENT)
Dept: FAMILY MEDICINE CLINIC | Age: 75
End: 2025-06-03
Payer: MEDICARE

## 2025-06-03 NOTE — TELEPHONE ENCOUNTER
"    Caller: Annie Friend \"Pat\"    Relationship: Self    Best call back number: [phone]    What form or medical record are you requesting: VERIFICATION OF CHRONIC CONDITIONS FORM [facility] FORM    Who is requesting this form or medical record from you: PATIENT    How would you like to receive the form or medical records (pick-up, mail, fax): FAX  If fax, what is the fax number: PATIENT DOESN'T HAVE THIS  If mail, what is the address: N/A  If pick-up, provide patient with address and location details    Timeframe paperwork needed: ASAP    Additional notes: PATIENT RECEIVED A LETTER FROM HER INSURANCE COMPANY THAT THEY HAD SENT A VERIFICATION OF CHRONIC CONDITIONS FORM TO YOUR OFFICE. SHE HAS NOT HEARD BACK ABOUT THIS.     THIS FORM NEEDS TO BE FILLED OUT SIGNED AND FAXED BACK. THERE IS PHONE NUMBER YOU CAN CALL IF YOU HAVE NOT RECEIVED THIS [phone]. PLEASE CALL THIS NUMBER TO GET THIS FORM FROM [facility]. IT WILL NOT BE IN EFFECT UNTIL THEY RECEIVE THIS FAX.         "

## 2025-06-04 NOTE — TELEPHONE ENCOUNTER
See paperwork under media dated  03/19/25 from [employer] requesting records.  Fwd  to  work pool.

## 2025-06-04 NOTE — TELEPHONE ENCOUNTER
The paperwork that you are referring to in her chart doesn't have a name listed on it so how are we to know that it is a request for this pt? It looks like there was a record request completed for her in March of this year for the same company. If they did not receive the paperwork then they would need to contact [email or web address] because that is who handles the records request.

## 2025-06-18 NOTE — TELEPHONE ENCOUNTER
Rx Refill Note  Requested Prescriptions     Pending Prescriptions Disp Refills    potassium chloride ER (K-TAB) 20 MEQ tablet controlled-release ER tablet [Pharmacy Med Name: Potassium Chloride ER 20 MEQ Oral Tablet Extended Release] 360 tablet 0     Sig: Take 2 tablets by mouth twice daily      Last office visit with prescribing clinician: 4/11/2025   Last telemedicine visit with prescribing clinician: Visit date not found   Next office visit with prescribing clinician: 10/13/2025  Potassium Supplement Protocol Failed       Destiney Mims LPN  06/18/25, 09:29 EDT

## 2025-06-20 RX ORDER — POTASSIUM CHLORIDE 1500 MG/1
40 TABLET, EXTENDED RELEASE ORAL 2 TIMES DAILY
Qty: 360 TABLET | Refills: 0 | Status: SHIPPED | OUTPATIENT
Start: 2025-06-20

## 2025-07-23 DIAGNOSIS — F34.1 DYSTHYMIC DISORDER: ICD-10-CM

## 2025-07-24 NOTE — TELEPHONE ENCOUNTER
Controlled refill request:  Requested Prescriptions     Pending Prescriptions Disp Refills    clonazePAM (KlonoPIN) 2 MG tablet [Pharmacy Med Name: CLONAZEPAM 2MG      TAB] 90 tablet 0     Sig: TAKE 1 TABLET BY MOUTH THREE TIMES DAILY AS NEEDED FOR ANXIETY      Last OV:  4/11/2025  Next OV:  10/13/2025  Last fill:  04/22/25, #90, 2 refills  Last tox:  10/23/2023

## 2025-07-27 RX ORDER — CLONAZEPAM 2 MG/1
2 TABLET ORAL 3 TIMES DAILY PRN
Qty: 90 TABLET | Refills: 0 | Status: SHIPPED | OUTPATIENT
Start: 2025-07-27

## 2025-08-16 DIAGNOSIS — E11.9 TYPE 2 DIABETES MELLITUS TREATED WITHOUT INSULIN: ICD-10-CM

## 2025-08-19 RX ORDER — GLIMEPIRIDE 2 MG/1
2 TABLET ORAL 2 TIMES DAILY
Qty: 180 TABLET | Refills: 0 | Status: SHIPPED | OUTPATIENT
Start: 2025-08-19